# Patient Record
Sex: MALE | Race: WHITE | NOT HISPANIC OR LATINO | Employment: FULL TIME | URBAN - METROPOLITAN AREA
[De-identification: names, ages, dates, MRNs, and addresses within clinical notes are randomized per-mention and may not be internally consistent; named-entity substitution may affect disease eponyms.]

---
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
## 2018-01-13 VITALS
HEIGHT: 69 IN | BODY MASS INDEX: 20.88 KG/M2 | WEIGHT: 141 LBS | TEMPERATURE: 97.4 F | DIASTOLIC BLOOD PRESSURE: 64 MMHG | SYSTOLIC BLOOD PRESSURE: 108 MMHG

## 2018-01-13 NOTE — MISCELLANEOUS
To Whom It May Concern:    Milana Abernathy is a patient seen in our office  He has been previously diagnosed with autism and ADHD            Electronically signed by:Quincy Ignacio DO  Sep 26 2017  8:52PM EST

## 2018-01-14 VITALS
HEIGHT: 69 IN | DIASTOLIC BLOOD PRESSURE: 80 MMHG | WEIGHT: 146 LBS | SYSTOLIC BLOOD PRESSURE: 118 MMHG | BODY MASS INDEX: 21.62 KG/M2 | TEMPERATURE: 99.7 F

## 2018-01-14 VITALS
HEIGHT: 69 IN | BODY MASS INDEX: 21.18 KG/M2 | TEMPERATURE: 98.1 F | DIASTOLIC BLOOD PRESSURE: 84 MMHG | WEIGHT: 143 LBS | SYSTOLIC BLOOD PRESSURE: 138 MMHG

## 2018-01-23 VITALS
SYSTOLIC BLOOD PRESSURE: 122 MMHG | TEMPERATURE: 97.5 F | BODY MASS INDEX: 21.33 KG/M2 | WEIGHT: 144 LBS | HEIGHT: 69 IN | DIASTOLIC BLOOD PRESSURE: 80 MMHG

## 2018-01-23 NOTE — PROGRESS NOTES
Assessment    1  Encounter for preventive health examination (V70 0) (Z00 00)    Plan  Allergy to nuts    · EpiPen 2-Jayme 0 3 MG/0 3ML Injection Solution Auto-injector; Use as directed, prn  allergic reaction  Asthma    · Ventolin  (90 Base) MCG/ACT Inhalation Aerosol Solution; Inhale 2 puffs  every 6 hours as needed for cough  Health Maintenance    · Pantoprazole Sodium 20 MG Oral Tablet Delayed Release; Take 1 tablet daily    Discussion/Summary  Impression: health maintenance visit  Prostate cancer screening: the risks and benefits of prostate cancer screening were discussed  Testicular cancer screening: the risks and benefits of testicular cancer screening were discussed  Chief Complaint  physical      History of Present Illness  HM, Adult Male: The patient is being seen for a health maintenance evaluation  General Health: The patient's health since the last visit is described as good  He has regular dental visits  He denies vision problems  He denies hearing loss  Immunizations status: up to date  Lifestyle:  He consumes a diverse and healthy diet  He does not have any weight concerns  He exercises regularly  He does not use tobacco  He denies alcohol use  He denies drug use  Screening: cancer screening reviewed and current  metabolic screening reviewed and current  risk screening reviewed and current  Review of Systems    Constitutional: No fever or chills, feels well, no tiredness, no recent weight gain or weight loss  Eyes: No complaints of eye pain, no red eyes, no discharge from eyes, no itchy eyes  ENT: no complaints of earache, no hearing loss, no nosebleeds, no nasal discharge, no sore throat, no hoarseness  Cardiovascular: No complaints of slow heart rate, no fast heart rate, no chest pain, no palpitations, no leg claudication, no lower extremity  Respiratory: No complaints of shortness of breath, no wheezing, no cough, no SOB on exertion, no orthopnea or PND  Gastrointestinal: No complaints of abdominal pain, no constipation, no nausea or vomiting, no diarrhea or bloody stools  Genitourinary: No complaints of dysuria, no incontinence, no hesitancy, no nocturia, no genital lesion, no testicular pain  Musculoskeletal: No complaints of arthralgia, no myalgias, no joint swelling or stiffness, no limb pain or swelling  Integumentary: No complaints of skin rash or skin lesions, no itching, no skin wound, no dry skin  Neurological: No compliants of headache, no confusion, no convulsions, no numbness or tingling, no dizziness or fainting, no limb weakness, no difficulty walking  Psychiatric: Is not suicidal, no sleep disturbances, no anxiety or depression, no change in personality, no emotional problems  Endocrine: No complaints of proptosis, no hot flashes, no muscle weakness, no erectile dysfunction, no deepening of the voice, no feelings of weakness  Hematologic/Lymphatic: No complaints of swollen glands, no swollen glands in the neck, does not bleed easily, no easy bruising  Active Problems    1  Acute nonsuppurative otitis media, unspecified laterality (381 00) (H65 199)   2  Allergy to nuts (V15 05) (Z91 018)   3  Ankle Sprain (845 00)   4  Asthma (493 90) (J45 909)   5  Attention Deficit Disorder Without Hyperactivity (314 00)   6  Autism (299 00) (F84 0)   7  Cough (786 2) (R05)   8  's permit physical examination (V70 3) (Z02 4)   9  Fatigue (780 79) (R53 83)   10  Impacted cerumen of left ear (380 4) (H61 22)   11  Lipoma (214 9) (D17 9)   12  Lumbar strain (847 2) (S39 012A)   13  Myalgia And Myositis (729 1)   14  Physical exam for camp (V70 3) (Z02 89)   15  Right elbow pain (719 42) (M25 521)   16  School physical exam (V70 5) (Z02 0)   17  Thoracic back pain (724 1) (M54 6)    Family History  Mother    · No pertinent family history    Social History    · Never A Smoker    Current Meds   1   Benadryl Allergy 25 MG Oral Tablet; TAKE 1 TABLET EVERY 4 HOURS AS NEEDED   Recorded   2  EpiPen 2-Jayme 0 3 MG/0 3ML Injection Solution Auto-injector; Use as directed, prn allergic   reaction; Therapy: 42GYD8890 to (Last Rx:08Jun2017)  Requested for: 38QPH2380 Ordered   3  Montelukast Sodium 10 MG Oral Tablet; TAKE 1 TABLET DAILY; Therapy: 97YHV0503 to (96 179251)  Requested for: 14QKE1311; Last   Rx:08Jun2017 Ordered   4  Ventolin  (90 Base) MCG/ACT Inhalation Aerosol Solution; Inhale 2 puffs every 6   hours as needed for cough; Therapy: 44Oke7952 to (Last Rx:11Aug2016)  Requested for: 23Jnp8500 Ordered    Allergies    1  Peanut-derived    Vitals   Recorded: 48TBZ8265 09:33AM   Temperature 92 0 F   Systolic 946   Diastolic 80   Height 5 ft 9 in   Weight 144 lb    BMI Calculated 21 27   BSA Calculated 1 8   BMI Percentile 32 %   2-20 Stature Percentile 42 %   2-20 Weight Percentile 35 %     Physical Exam    Constitutional   General appearance: No acute distress, well appearing and well nourished  Eyes   Conjunctiva and lids: No swelling, erythema, or discharge  Pupils and irises: Equal, round and reactive to light  Ears, Nose, Mouth, and Throat   External inspection of ears and nose: Normal     Otoscopic examination: Tympanic membrance translucent with normal light reflex  Canals patent without erythema  Oropharynx: Normal with no erythema, edema, exudate or lesions  Pulmonary   Respiratory effort: No increased work of breathing or signs of respiratory distress  Auscultation of lungs: Clear to auscultation  Cardiovascular   Palpation of heart: Normal PMI, no thrills  Auscultation of heart: Normal rate and rhythm, normal S1 and S2, without murmurs  Examination of extremities for edema and/or varicosities: Normal     Abdomen   Abdomen: Non-tender, no masses  Liver and spleen: No hepatomegaly or splenomegaly  Lymphatic   Palpation of lymph nodes in neck: No lymphadenopathy      Musculoskeletal   Gait and station: Normal     Digits and nails: Normal without clubbing or cyanosis  Inspection/palpation of joints, bones, and muscles: Normal     Skin   Skin and subcutaneous tissue: Normal without rashes or lesions  Neurologic   Cranial nerves: Cranial nerves 2-12 intact  Reflexes: 2+ and symmetric  Sensation: No sensory loss      Psychiatric   Orientation to person, place and time: Normal     Mood and affect: Normal        Signatures   Electronically signed by : Leonor Colvin DO; Dec 29 2017  9:56AM EST                       (Author)

## 2018-02-28 ENCOUNTER — OFFICE VISIT (OUTPATIENT)
Dept: FAMILY MEDICINE CLINIC | Facility: CLINIC | Age: 18
End: 2018-02-28
Payer: COMMERCIAL

## 2018-02-28 VITALS — BODY MASS INDEX: 21.03 KG/M2 | TEMPERATURE: 99 F | WEIGHT: 142 LBS | HEIGHT: 69 IN

## 2018-02-28 DIAGNOSIS — Z00.00 WELL ADULT EXAM: Primary | ICD-10-CM

## 2018-02-28 DIAGNOSIS — J45.909 MILD ASTHMA WITHOUT COMPLICATION, UNSPECIFIED WHETHER PERSISTENT: ICD-10-CM

## 2018-02-28 DIAGNOSIS — F17.210 CIGARETTE NICOTINE DEPENDENCE WITHOUT COMPLICATION: ICD-10-CM

## 2018-02-28 PROBLEM — F84.0 AUTISM: Status: ACTIVE | Noted: 2017-06-08

## 2018-02-28 LAB
ALBUMIN SERPL BCP-MCNC: 4.2 G/DL (ref 3.5–5)
ALP SERPL-CCNC: 64 U/L (ref 46–484)
ALT SERPL W P-5'-P-CCNC: 18 U/L (ref 12–78)
ANION GAP SERPL CALCULATED.3IONS-SCNC: 6 MMOL/L (ref 4–13)
AST SERPL W P-5'-P-CCNC: 15 U/L (ref 5–45)
BASOPHILS # BLD AUTO: 0.04 THOUSANDS/ΜL (ref 0–0.1)
BASOPHILS NFR BLD AUTO: 1 % (ref 0–1)
BILIRUB SERPL-MCNC: 0.4 MG/DL (ref 0.2–1)
BUN SERPL-MCNC: 10 MG/DL (ref 5–25)
CALCIUM SERPL-MCNC: 9 MG/DL (ref 8.3–10.1)
CHLORIDE SERPL-SCNC: 106 MMOL/L (ref 100–108)
CHOLEST SERPL-MCNC: 159 MG/DL (ref 50–200)
CO2 SERPL-SCNC: 29 MMOL/L (ref 21–32)
CREAT SERPL-MCNC: 0.78 MG/DL (ref 0.6–1.3)
EOSINOPHIL # BLD AUTO: 0.3 THOUSAND/ΜL (ref 0–0.61)
EOSINOPHIL NFR BLD AUTO: 4 % (ref 0–6)
ERYTHROCYTE [DISTWIDTH] IN BLOOD BY AUTOMATED COUNT: 12.6 % (ref 11.6–15.1)
GFR SERPL CREATININE-BSD FRML MDRD: 131 ML/MIN/1.73SQ M
GLUCOSE P FAST SERPL-MCNC: 85 MG/DL (ref 65–99)
HCT VFR BLD AUTO: 42.5 % (ref 36.5–49.3)
HDLC SERPL-MCNC: 53 MG/DL (ref 40–60)
HGB BLD-MCNC: 14.3 G/DL (ref 12–17)
LDLC SERPL CALC-MCNC: 75 MG/DL (ref 0–100)
LYMPHOCYTES # BLD AUTO: 2.42 THOUSANDS/ΜL (ref 0.6–4.47)
LYMPHOCYTES NFR BLD AUTO: 33 % (ref 14–44)
MCH RBC QN AUTO: 29.9 PG (ref 26.8–34.3)
MCHC RBC AUTO-ENTMCNC: 33.6 G/DL (ref 31.4–37.4)
MCV RBC AUTO: 89 FL (ref 82–98)
MONOCYTES # BLD AUTO: 0.92 THOUSAND/ΜL (ref 0.17–1.22)
MONOCYTES NFR BLD AUTO: 12 % (ref 4–12)
NEUTROPHILS # BLD AUTO: 3.69 THOUSANDS/ΜL (ref 1.85–7.62)
NEUTS SEG NFR BLD AUTO: 50 % (ref 43–75)
NRBC BLD AUTO-RTO: 0 /100 WBCS
PLATELET # BLD AUTO: 383 THOUSANDS/UL (ref 149–390)
PMV BLD AUTO: 9.7 FL (ref 8.9–12.7)
POTASSIUM SERPL-SCNC: 4.4 MMOL/L (ref 3.5–5.3)
PROT SERPL-MCNC: 7.7 G/DL (ref 6.4–8.2)
RBC # BLD AUTO: 4.78 MILLION/UL (ref 3.88–5.62)
SODIUM SERPL-SCNC: 141 MMOL/L (ref 136–145)
TRIGL SERPL-MCNC: 155 MG/DL
WBC # BLD AUTO: 7.39 THOUSAND/UL (ref 4.31–10.16)

## 2018-02-28 PROCEDURE — 99395 PREV VISIT EST AGE 18-39: CPT | Performed by: FAMILY MEDICINE

## 2018-02-28 PROCEDURE — 36415 COLL VENOUS BLD VENIPUNCTURE: CPT | Performed by: FAMILY MEDICINE

## 2018-02-28 PROCEDURE — 99213 OFFICE O/P EST LOW 20 MIN: CPT | Performed by: FAMILY MEDICINE

## 2018-02-28 PROCEDURE — 3008F BODY MASS INDEX DOCD: CPT | Performed by: FAMILY MEDICINE

## 2018-02-28 PROCEDURE — 80053 COMPREHEN METABOLIC PANEL: CPT | Performed by: FAMILY MEDICINE

## 2018-02-28 PROCEDURE — 85025 COMPLETE CBC W/AUTO DIFF WBC: CPT | Performed by: FAMILY MEDICINE

## 2018-02-28 PROCEDURE — 80061 LIPID PANEL: CPT | Performed by: FAMILY MEDICINE

## 2018-02-28 RX ORDER — VARENICLINE TARTRATE 25 MG
KIT ORAL
Qty: 53 TABLET | Refills: 1 | Status: SHIPPED | OUTPATIENT
Start: 2018-02-28 | End: 2018-04-09 | Stop reason: SDUPTHER

## 2018-02-28 RX ORDER — ALBUTEROL SULFATE 90 UG/1
2 AEROSOL, METERED RESPIRATORY (INHALATION) EVERY 6 HOURS PRN
COMMUNITY
Start: 2016-08-11 | End: 2018-11-06 | Stop reason: SDUPTHER

## 2018-02-28 RX ORDER — DIPHENHYDRAMINE HCL 25 MG
1 CAPSULE ORAL EVERY 4 HOURS PRN
COMMUNITY
End: 2020-12-29

## 2018-04-09 DIAGNOSIS — F17.210 CIGARETTE NICOTINE DEPENDENCE WITHOUT COMPLICATION: ICD-10-CM

## 2018-04-10 RX ORDER — VARENICLINE TARTRATE 25 MG
KIT ORAL
Qty: 53 TABLET | Refills: 0 | Status: SHIPPED | OUTPATIENT
Start: 2018-04-10 | End: 2018-11-06

## 2018-10-01 NOTE — PROGRESS NOTES
Assessment/Plan:    Patient appears to be doing well  We discussed smoking cessation options  He would like to try Chantix  Side effect profile of medication reviewed  Prescription given to patient  If any side effects or difficulty with quitting recommend return to office for re-evaluation  Blood testing drawn today  Await results  He will call with any need for refills on asthma medication but asthma is stable at this time  No problem-specific Assessment & Plan notes found for this encounter  Diagnoses and all orders for this visit:    Well adult exam  -     CBC and differential  -     Comprehensive metabolic panel  -     Lipid Panel with Direct LDL reflex    Cigarette nicotine dependence without complication  -     varenicline (CHANTIX CBUA) 0 5 MG X 11 & 1 MG X 42 tablet; Take one 0 5mg tablet by mouth once daily for 3 days, then increase to one 0 5mg tablet twice daily for 3 days, then increase to one 1mg tablet twice daily   -     CBC and differential  -     Comprehensive metabolic panel  -     Lipid Panel with Direct LDL reflex    Other orders  -     diphenhydrAMINE (BENADRYL ALLERGY) 25 mg capsule; Take 1 tablet by mouth every 4 (four) hours as needed  -     albuterol (VENTOLIN HFA) 90 mcg/act inhaler; Inhale 2 puffs every 6 (six) hours as needed          Subjective:      Patient ID: Delia Menard is a 21 y o  male  Patient is here for well check  He states he is generally feeling well  Asthma is under good control  He is a smoker and would like to discuss smoking cessation options  He would like to try a pill to help him quit smoking  He smokes approximately 1/2 pack a day  Patient is also requesting blood testing to be done for insurance purposes  He has no specific requests for specific blood testing          The following portions of the patient's history were reviewed and updated as appropriate: allergies, current medications, past family history, past medical history, past social history, past surgical history and problem list     Review of Systems   Constitutional: Negative  HENT: Negative  Eyes: Negative  Respiratory: Negative  Cardiovascular: Negative  Gastrointestinal: Negative  Endocrine: Negative  Genitourinary: Negative  Musculoskeletal: Negative  Skin: Negative  Allergic/Immunologic: Negative  Neurological: Negative  Hematological: Negative  Psychiatric/Behavioral: Negative  Objective:      Temp 99 °F (37 2 °C)   Ht 5' 9" (1 753 m)   Wt 64 4 kg (142 lb)   BMI 20 97 kg/m²          Physical Exam   Constitutional: He is oriented to person, place, and time  He appears well-developed and well-nourished  HENT:   Head: Normocephalic and atraumatic  Right Ear: External ear normal  Tympanic membrane is not erythematous and not bulging  Left Ear: External ear normal  Tympanic membrane is not erythematous and not bulging  Nose: Nose normal    Mouth/Throat: Oropharynx is clear and moist and mucous membranes are normal  No oral lesions  No oropharyngeal exudate  Eyes: Conjunctivae and EOM are normal  Right eye exhibits no discharge  Left eye exhibits no discharge  No scleral icterus  Neck: Normal range of motion  Neck supple  No thyromegaly present  Cardiovascular: Normal rate, regular rhythm and normal heart sounds  Exam reveals no gallop and no friction rub  No murmur heard  Pulmonary/Chest: Effort normal  No respiratory distress  He has no wheezes  He has no rales  He exhibits no tenderness  Abdominal: Soft  Bowel sounds are normal  He exhibits no distension and no mass  There is no tenderness  There is no rebound and no guarding  Musculoskeletal: Normal range of motion  He exhibits no edema, tenderness or deformity  Lymphadenopathy:     He has no cervical adenopathy  Neurological: He is alert and oriented to person, place, and time  He has normal reflexes  No cranial nerve deficit   He exhibits normal muscle tone  Coordination normal    Skin: Skin is warm and dry  No rash noted  No erythema  No pallor  Psychiatric: He has a normal mood and affect  His behavior is normal    Vitals reviewed  What Is The Reason For Today's Visit?: Full Body Skin Examination What Is The Reason For Today's Visit? (Being Monitored For X): concerning skin lesions on an annual basis How Severe Are Your Spot(S)?: mild

## 2018-10-03 ENCOUNTER — TELEPHONE (OUTPATIENT)
Dept: FAMILY MEDICINE CLINIC | Facility: CLINIC | Age: 18
End: 2018-10-03

## 2018-10-03 NOTE — TELEPHONE ENCOUNTER
Patient explains that he is currently in [place], recently he had bubbles in around his mouth and wants to go for blood work, he's going to [facility] (fax # [phone])   Please advise his call back number is [phone]

## 2018-10-03 NOTE — TELEPHONE ENCOUNTER
It is difficult to know what to order without evaluating him  He may wish to consider being seen at an urgent care center in [facility]

## 2018-11-06 ENCOUNTER — OFFICE VISIT (OUTPATIENT)
Dept: FAMILY MEDICINE CLINIC | Facility: CLINIC | Age: 18
End: 2018-11-06
Payer: COMMERCIAL

## 2018-11-06 VITALS
HEIGHT: 69 IN | HEART RATE: 78 BPM | SYSTOLIC BLOOD PRESSURE: 136 MMHG | TEMPERATURE: 97.8 F | DIASTOLIC BLOOD PRESSURE: 82 MMHG | WEIGHT: 156 LBS | OXYGEN SATURATION: 98 % | BODY MASS INDEX: 23.11 KG/M2

## 2018-11-06 DIAGNOSIS — Z00.00 WELL ADULT EXAM: Primary | ICD-10-CM

## 2018-11-06 DIAGNOSIS — G43.009 MIGRAINE WITHOUT AURA AND WITHOUT STATUS MIGRAINOSUS, NOT INTRACTABLE: ICD-10-CM

## 2018-11-06 PROCEDURE — 3725F SCREEN DEPRESSION PERFORMED: CPT | Performed by: FAMILY MEDICINE

## 2018-11-06 PROCEDURE — 99395 PREV VISIT EST AGE 18-39: CPT | Performed by: FAMILY MEDICINE

## 2018-11-06 RX ORDER — RIZATRIPTAN BENZOATE 10 MG/1
10 TABLET, ORALLY DISINTEGRATING ORAL ONCE AS NEEDED
Qty: 27 TABLET | Refills: 3 | Status: SHIPPED | OUTPATIENT
Start: 2018-11-06 | End: 2018-11-06 | Stop reason: CLARIF

## 2018-11-06 RX ORDER — RIZATRIPTAN BENZOATE 10 MG/1
10 TABLET ORAL ONCE AS NEEDED
Qty: 27 TABLET | Refills: 1 | Status: SHIPPED | OUTPATIENT
Start: 2018-11-06 | End: 2020-12-29

## 2018-11-06 RX ORDER — ALBUTEROL SULFATE 1.25 MG/3ML
1 SOLUTION RESPIRATORY (INHALATION) EVERY 6 HOURS PRN
COMMUNITY

## 2018-11-06 NOTE — PROGRESS NOTES
Assessment/Plan:  Anticipatory guidance provided today  Continues to stay active and avoid smoking  Recommend consideration for starting medication to be taken as needed for migraines  Patient requested blood testing  Drawn today  Await results  Recommend return to office in 1 year for annual recheck  No problem-specific Assessment & Plan notes found for this encounter  Diagnoses and all orders for this visit:    Well adult exam  -     Cancel: Comprehensive metabolic panel  -     Cancel: CBC and differential  -     Cancel: TSH, 3rd generation with Free T4 reflex  -     Comprehensive metabolic panel  -     CBC and differential  -     TSH, 3rd generation with Free T4 reflex    Migraine without aura and without status migrainosus, not intractable  -     Discontinue: rizatriptan (MAXALT-MLT) 10 MG disintegrating tablet; Take 1 tablet (10 mg total) by mouth once as needed for migraine for up to 1 dose May repeat in 2 hours if needed  -     CBC and differential  -     TSH, 3rd generation with Free T4 reflex  -     rizatriptan (MAXALT) 10 MG tablet; Take 1 tablet (10 mg total) by mouth once as needed for migraine for up to 1 dose May repeat in 2 hours if needed    Other orders  -     albuterol (ACCUNEB) 1 25 MG/3ML nebulizer solution; Inhale 1 ampule every 6 (six) hours as needed          Subjective:      Patient ID: Faina Dennis is a 21 y o  male  The patient is here for well check  Patient Ng knowledge is that he was here in February for well check but would like another 1  He would also like to have blood testing drawn today  He is generally feeling well  Denies chest pain or shortness of breath  He remains active  He does note occasional history of migraines  He has a family history of migraines  Symptoms are intermittent and sometimes occur with changes in the weather or are food related          The following portions of the patient's history were reviewed and updated as appropriate: allergies, current medications, past family history, past medical history, past social history, past surgical history and problem list     Review of Systems   Constitutional: Negative  HENT: Negative  Eyes: Negative  Respiratory: Negative  Cardiovascular: Negative  Gastrointestinal: Negative  Endocrine: Negative  Genitourinary: Negative  Musculoskeletal: Negative  Skin: Negative  Allergic/Immunologic: Negative  Neurological: Negative  Hematological: Negative  Psychiatric/Behavioral: Negative  Objective:      /82 (BP Location: Left arm, Patient Position: Sitting, Cuff Size: Standard)   Pulse 78   Temp 97 8 °F (36 6 °C) (Tympanic)   Ht 5' 8 7" (1 745 m)   Wt 70 8 kg (156 lb)   SpO2 98%   BMI 23 24 kg/m²          Physical Exam   Constitutional: He is oriented to person, place, and time  He appears well-developed and well-nourished  HENT:   Head: Normocephalic and atraumatic  Right Ear: External ear normal  Tympanic membrane is not erythematous and not bulging  Left Ear: External ear normal  Tympanic membrane is not erythematous and not bulging  Nose: Nose normal    Mouth/Throat: Oropharynx is clear and moist and mucous membranes are normal  No oral lesions  No oropharyngeal exudate  Eyes: Conjunctivae and EOM are normal  Right eye exhibits no discharge  Left eye exhibits no discharge  No scleral icterus  Neck: Normal range of motion  Neck supple  No thyromegaly present  Cardiovascular: Normal rate, regular rhythm and normal heart sounds  Exam reveals no gallop and no friction rub  No murmur heard  Pulmonary/Chest: Effort normal  No respiratory distress  He has no wheezes  He has no rales  He exhibits no tenderness  Abdominal: Soft  Bowel sounds are normal  He exhibits no distension and no mass  There is no tenderness  There is no rebound and no guarding  Musculoskeletal: Normal range of motion  He exhibits no edema, tenderness or deformity  Lymphadenopathy:     He has no cervical adenopathy  Neurological: He is alert and oriented to person, place, and time  He has normal reflexes  No cranial nerve deficit  He exhibits normal muscle tone  Coordination normal    Skin: Skin is warm and dry  No rash noted  No erythema  No pallor  Psychiatric: He has a normal mood and affect  His behavior is normal    Vitals reviewed

## 2018-11-07 LAB
ALBUMIN SERPL-MCNC: 4.6 G/DL (ref 3.6–5.1)
ALBUMIN/GLOB SERPL: 1.6 (CALC) (ref 1–2.5)
ALP SERPL-CCNC: 52 U/L (ref 48–230)
ALT SERPL-CCNC: 22 U/L (ref 8–46)
AST SERPL-CCNC: 20 U/L (ref 12–32)
BASOPHILS # BLD AUTO: 59 CELLS/UL (ref 0–200)
BASOPHILS NFR BLD AUTO: 0.6 %
BILIRUB SERPL-MCNC: 0.9 MG/DL (ref 0.2–1.1)
BUN SERPL-MCNC: 7 MG/DL (ref 7–20)
BUN/CREAT SERPL: NORMAL (CALC) (ref 6–22)
CALCIUM SERPL-MCNC: 9.7 MG/DL (ref 8.9–10.4)
CHLORIDE SERPL-SCNC: 105 MMOL/L (ref 98–110)
CO2 SERPL-SCNC: 27 MMOL/L (ref 20–32)
CREAT SERPL-MCNC: 0.82 MG/DL (ref 0.6–1.26)
EOSINOPHIL # BLD AUTO: 216 CELLS/UL (ref 15–500)
EOSINOPHIL NFR BLD AUTO: 2.2 %
ERYTHROCYTE [DISTWIDTH] IN BLOOD BY AUTOMATED COUNT: 12.1 % (ref 11–15)
GLOBULIN SER CALC-MCNC: 2.9 G/DL (CALC) (ref 2.1–3.5)
GLUCOSE SERPL-MCNC: 90 MG/DL (ref 65–99)
HCT VFR BLD AUTO: 43.4 % (ref 38.5–50)
HGB BLD-MCNC: 14.9 G/DL (ref 13.2–17.1)
LYMPHOCYTES # BLD AUTO: 2509 CELLS/UL (ref 850–3900)
LYMPHOCYTES NFR BLD AUTO: 25.6 %
MCH RBC QN AUTO: 30.3 PG (ref 27–33)
MCHC RBC AUTO-ENTMCNC: 34.3 G/DL (ref 32–36)
MCV RBC AUTO: 88.2 FL (ref 80–100)
MONOCYTES # BLD AUTO: 745 CELLS/UL (ref 200–950)
MONOCYTES NFR BLD AUTO: 7.6 %
NEUTROPHILS # BLD AUTO: 6272 CELLS/UL (ref 1500–7800)
NEUTROPHILS NFR BLD AUTO: 64 %
PLATELET # BLD AUTO: 333 THOUSAND/UL (ref 140–400)
PMV BLD REES-ECKER: 10.1 FL (ref 7.5–12.5)
POTASSIUM SERPL-SCNC: 4.2 MMOL/L (ref 3.8–5.1)
PROT SERPL-MCNC: 7.5 G/DL (ref 6.3–8.2)
RBC # BLD AUTO: 4.92 MILLION/UL (ref 4.2–5.8)
SL AMB EGFR AFRICAN AMERICAN: 149 ML/MIN/1.73M2
SL AMB EGFR NON AFRICAN AMERICAN: 128 ML/MIN/1.73M2
SODIUM SERPL-SCNC: 138 MMOL/L (ref 135–146)
TSH SERPL-ACNC: 2.19 MIU/L (ref 0.5–4.3)
WBC # BLD AUTO: 9.8 THOUSAND/UL (ref 3.8–10.8)

## 2019-07-05 ENCOUNTER — OFFICE VISIT (OUTPATIENT)
Dept: FAMILY MEDICINE CLINIC | Facility: CLINIC | Age: 19
End: 2019-07-05
Payer: COMMERCIAL

## 2019-07-05 VITALS
DIASTOLIC BLOOD PRESSURE: 86 MMHG | TEMPERATURE: 98.4 F | SYSTOLIC BLOOD PRESSURE: 132 MMHG | WEIGHT: 157 LBS | BODY MASS INDEX: 23.79 KG/M2 | HEIGHT: 68 IN

## 2019-07-05 DIAGNOSIS — M79.672 PAIN IN BOTH FEET: Primary | ICD-10-CM

## 2019-07-05 DIAGNOSIS — L30.9 ECZEMA, UNSPECIFIED TYPE: ICD-10-CM

## 2019-07-05 DIAGNOSIS — J45.909 MILD ASTHMA WITHOUT COMPLICATION, UNSPECIFIED WHETHER PERSISTENT: ICD-10-CM

## 2019-07-05 DIAGNOSIS — M79.671 PAIN IN BOTH FEET: Primary | ICD-10-CM

## 2019-07-05 PROCEDURE — 36415 COLL VENOUS BLD VENIPUNCTURE: CPT | Performed by: FAMILY MEDICINE

## 2019-07-05 PROCEDURE — 99214 OFFICE O/P EST MOD 30 MIN: CPT | Performed by: FAMILY MEDICINE

## 2019-07-05 RX ORDER — MOMETASONE FUROATE 1 MG/G
CREAM TOPICAL DAILY
Qty: 45 G | Refills: 0 | Status: SHIPPED | OUTPATIENT
Start: 2019-07-05 | End: 2020-12-29

## 2019-07-05 RX ORDER — MELOXICAM 15 MG/1
15 TABLET ORAL DAILY
Qty: 14 TABLET | Refills: 0 | Status: SHIPPED | OUTPATIENT
Start: 2019-07-05 | End: 2020-12-29

## 2019-07-05 RX ORDER — IBUPROFEN 600 MG/1
TABLET ORAL
Refills: 0 | COMMUNITY
Start: 2019-05-07 | End: 2019-07-05 | Stop reason: ALTCHOICE

## 2019-07-05 NOTE — PROGRESS NOTES
Assessment/Plan:  No significant findings on physical exam   Recommend follow-up with podiatrist   Await blood test results requested by patient  Asthma is currently stable  No problem-specific Assessment & Plan notes found for this encounter  Diagnoses and all orders for this visit:    Pain in both feet  -     meloxicam (MOBIC) 15 mg tablet; Take 1 tablet (15 mg total) by mouth daily  -     Cancel: CBC and differential  -     Cancel: Comprehensive metabolic panel  -     CBC and differential  -     Comprehensive metabolic panel    Eczema, unspecified type  -     mometasone (ELOCON) 0 1 % cream; Apply topically daily    Mild asthma without complication, unspecified whether persistent    Other orders  -     Discontinue: ibuprofen (MOTRIN) 600 mg tablet; TAKE 1 TABLET BY MOUTH EVERY 6 HOURS FOR FIRST 48 HOURS, THEN AS NEEDED FOR PAIN          Subjective:      Patient ID: Maycol Parra is a 19 y o  male  Patient is here for with bilateral foot pain  He states onset of symptoms was 1-2 months ago  Pain is to the plantar aspects of both feet  Denies proximal leg pain  No back pain  Patient also notes occasional fatigue and would like blood testing done  No fatigue shortness of breath or chest pain in the last 1 week  Patient also complains of dry skin to the bilateral arms intermittently over the last several months  The following portions of the patient's history were reviewed and updated as appropriate: allergies, current medications, past family history, past medical history, past social history, past surgical history and problem list     Review of Systems   Constitutional: Negative  HENT: Negative  Eyes: Negative  Respiratory: Negative  Cardiovascular: Negative  Gastrointestinal: Negative  Endocrine: Negative  Genitourinary: Negative  Musculoskeletal: Positive for arthralgias  Skin: Positive for rash  Allergic/Immunologic: Negative  Neurological: Negative  Hematological: Negative  Psychiatric/Behavioral: Negative  Objective:      /86 (BP Location: Left arm, Patient Position: Sitting, Cuff Size: Adult)   Temp 98 4 °F (36 9 °C)   Ht 5' 8" (1 727 m)   Wt 71 2 kg (157 lb)   BMI 23 87 kg/m²          Physical Exam   Constitutional: He is oriented to person, place, and time  He appears well-developed and well-nourished  HENT:   Head: Normocephalic and atraumatic  Right Ear: External ear normal  Tympanic membrane is not erythematous and not bulging  Left Ear: External ear normal  Tympanic membrane is not erythematous and not bulging  Nose: Nose normal    Mouth/Throat: Oropharynx is clear and moist and mucous membranes are normal  No oral lesions  No oropharyngeal exudate  Eyes: Conjunctivae and EOM are normal  Right eye exhibits no discharge  Left eye exhibits no discharge  No scleral icterus  Neck: Normal range of motion  Neck supple  No thyromegaly present  Cardiovascular: Normal rate, regular rhythm and normal heart sounds  Exam reveals no gallop and no friction rub  No murmur heard  Pulmonary/Chest: Effort normal  No respiratory distress  He has no wheezes  He has no rales  He exhibits no tenderness  Abdominal: Soft  Bowel sounds are normal  He exhibits no distension and no mass  There is no tenderness  There is no rebound and no guarding  Musculoskeletal: Normal range of motion  He exhibits no edema, tenderness or deformity  Lymphadenopathy:     He has no cervical adenopathy  Neurological: He is alert and oriented to person, place, and time  He has normal reflexes  No cranial nerve deficit  He exhibits normal muscle tone  Coordination normal    Skin: Skin is warm and dry  Rash (Mild eczema to the bilateral antecubital areas of the arms and forearms ) noted  No erythema  No pallor  Psychiatric: He has a normal mood and affect  His behavior is normal    Vitals reviewed

## 2019-07-06 LAB
ALBUMIN SERPL-MCNC: 4.6 G/DL (ref 3.6–5.1)
ALBUMIN/GLOB SERPL: 1.6 (CALC) (ref 1–2.5)
ALP SERPL-CCNC: 63 U/L (ref 40–115)
ALT SERPL-CCNC: 19 U/L (ref 9–46)
AST SERPL-CCNC: 18 U/L (ref 10–40)
BASOPHILS # BLD AUTO: 83 CELLS/UL (ref 0–200)
BASOPHILS NFR BLD AUTO: 1.1 %
BILIRUB SERPL-MCNC: 0.5 MG/DL (ref 0.2–1.2)
BUN SERPL-MCNC: 8 MG/DL (ref 7–25)
BUN/CREAT SERPL: NORMAL (CALC) (ref 6–22)
CALCIUM SERPL-MCNC: 9.8 MG/DL (ref 8.6–10.3)
CHLORIDE SERPL-SCNC: 105 MMOL/L (ref 98–110)
CO2 SERPL-SCNC: 24 MMOL/L (ref 20–32)
CREAT SERPL-MCNC: 0.86 MG/DL (ref 0.6–1.35)
EOSINOPHIL # BLD AUTO: 330 CELLS/UL (ref 15–500)
EOSINOPHIL NFR BLD AUTO: 4.4 %
ERYTHROCYTE [DISTWIDTH] IN BLOOD BY AUTOMATED COUNT: 12.2 % (ref 11–15)
GLOBULIN SER CALC-MCNC: 2.8 G/DL (CALC) (ref 1.9–3.7)
GLUCOSE SERPL-MCNC: 89 MG/DL (ref 65–99)
HCT VFR BLD AUTO: 47.6 % (ref 38.5–50)
HGB BLD-MCNC: 15.8 G/DL (ref 13.2–17.1)
LYMPHOCYTES # BLD AUTO: 2685 CELLS/UL (ref 850–3900)
LYMPHOCYTES NFR BLD AUTO: 35.8 %
MCH RBC QN AUTO: 29.6 PG (ref 27–33)
MCHC RBC AUTO-ENTMCNC: 33.2 G/DL (ref 32–36)
MCV RBC AUTO: 89.3 FL (ref 80–100)
MONOCYTES # BLD AUTO: 720 CELLS/UL (ref 200–950)
MONOCYTES NFR BLD AUTO: 9.6 %
NEUTROPHILS # BLD AUTO: 3683 CELLS/UL (ref 1500–7800)
NEUTROPHILS NFR BLD AUTO: 49.1 %
PLATELET # BLD AUTO: 331 THOUSAND/UL (ref 140–400)
PMV BLD REES-ECKER: 10 FL (ref 7.5–12.5)
POTASSIUM SERPL-SCNC: 4.2 MMOL/L (ref 3.5–5.3)
PROT SERPL-MCNC: 7.4 G/DL (ref 6.1–8.1)
RBC # BLD AUTO: 5.33 MILLION/UL (ref 4.2–5.8)
SL AMB EGFR AFRICAN AMERICAN: 145 ML/MIN/1.73M2
SL AMB EGFR NON AFRICAN AMERICAN: 125 ML/MIN/1.73M2
SODIUM SERPL-SCNC: 137 MMOL/L (ref 135–146)
WBC # BLD AUTO: 7.5 THOUSAND/UL (ref 3.8–10.8)

## 2019-10-30 ENCOUNTER — OCCMED (OUTPATIENT)
Dept: URGENT CARE | Facility: CLINIC | Age: 19
End: 2019-10-30
Payer: OTHER MISCELLANEOUS

## 2019-10-30 DIAGNOSIS — L08.9 INFECTED WOUND: Primary | ICD-10-CM

## 2019-10-30 DIAGNOSIS — T14.8XXA INFECTED WOUND: Primary | ICD-10-CM

## 2019-10-30 PROCEDURE — 99203 OFFICE O/P NEW LOW 30 MIN: CPT | Performed by: PHYSICIAN ASSISTANT

## 2019-10-30 PROCEDURE — 99283 EMERGENCY DEPT VISIT LOW MDM: CPT | Performed by: PHYSICIAN ASSISTANT

## 2019-10-31 ENCOUNTER — APPOINTMENT (OUTPATIENT)
Dept: URGENT CARE | Facility: CLINIC | Age: 19
End: 2019-10-31
Payer: OTHER MISCELLANEOUS

## 2019-10-31 PROCEDURE — 99213 OFFICE O/P EST LOW 20 MIN: CPT | Performed by: FAMILY MEDICINE

## 2019-11-05 ENCOUNTER — APPOINTMENT (OUTPATIENT)
Dept: URGENT CARE | Facility: CLINIC | Age: 19
End: 2019-11-05
Payer: OTHER MISCELLANEOUS

## 2019-11-05 PROCEDURE — 99213 OFFICE O/P EST LOW 20 MIN: CPT | Performed by: FAMILY MEDICINE

## 2019-11-12 ENCOUNTER — APPOINTMENT (OUTPATIENT)
Dept: URGENT CARE | Facility: CLINIC | Age: 19
End: 2019-11-12
Payer: OTHER MISCELLANEOUS

## 2019-11-12 PROCEDURE — 99213 OFFICE O/P EST LOW 20 MIN: CPT | Performed by: FAMILY MEDICINE

## 2019-12-04 ENCOUNTER — OFFICE VISIT (OUTPATIENT)
Dept: FAMILY MEDICINE CLINIC | Facility: CLINIC | Age: 19
End: 2019-12-04
Payer: COMMERCIAL

## 2019-12-04 VITALS
HEART RATE: 92 BPM | WEIGHT: 164 LBS | HEIGHT: 70 IN | TEMPERATURE: 98.2 F | SYSTOLIC BLOOD PRESSURE: 130 MMHG | OXYGEN SATURATION: 98 % | DIASTOLIC BLOOD PRESSURE: 82 MMHG | BODY MASS INDEX: 23.48 KG/M2

## 2019-12-04 DIAGNOSIS — J18.9 PNEUMONIA OF LEFT LOWER LOBE DUE TO INFECTIOUS ORGANISM: Primary | ICD-10-CM

## 2019-12-04 DIAGNOSIS — R93.89 ABNORMAL CHEST X-RAY: ICD-10-CM

## 2019-12-04 PROCEDURE — 1036F TOBACCO NON-USER: CPT | Performed by: FAMILY MEDICINE

## 2019-12-04 PROCEDURE — 3008F BODY MASS INDEX DOCD: CPT | Performed by: FAMILY MEDICINE

## 2019-12-04 PROCEDURE — 99213 OFFICE O/P EST LOW 20 MIN: CPT | Performed by: FAMILY MEDICINE

## 2019-12-04 PROCEDURE — 3725F SCREEN DEPRESSION PERFORMED: CPT | Performed by: FAMILY MEDICINE

## 2019-12-04 RX ORDER — DOXYCYCLINE HYCLATE 100 MG/1
CAPSULE ORAL
COMMUNITY
Start: 2019-12-04 | End: 2020-03-25 | Stop reason: SDUPTHER

## 2019-12-04 NOTE — PROGRESS NOTES
Assessment/Plan:  [facility]   I spoke with the physician that saw patient earlier today and x-ray has not been read by radiologist yet but urgent care physician, Dr Trina Pan, is concerned that there may be some findings more significant than pneumonia  She stated that chest x-ray will be read by radiologist in the hope to have that report tomorrow  I relayed this information to the patient today  I do recommend follow-up CT scan if radiologist findings are consistent with urgent care physician's findings  Script given for CT scan to be done if needed  I asked patient to give us a call if he hears back from the urgent care center tomorrow and I gave him our fax number to have results faxed from [facility] with his permission  He is on doxycycline twice daily and will continue on this medication  He will call with any worsening symptoms or any fevers in the interim  No problem-specific Assessment & Plan notes found for this encounter  Diagnoses and all orders for this visit:    Pneumonia of left lower lobe due to infectious organism [facility])  -     CT chest wo contrast; Future    Abnormal chest x-ray  -     CT chest wo contrast; Future    Other orders  -     doxycycline hyclate (VIBRAMYCIN) 100 mg capsule          Subjective:      Patient ID: Alexi Allen is a 22 y o  male  Patient is here today for follow-up after [facility] earlier in the day  He had a chest x-ray done and was told that he likely has pneumonia but he had some hilar adenopathy on the chest x-ray according to the physician, Dr Sugey Diamond  Patient states he was told to get a follow-up CT scan for better look at this  I do not have a copy of the x-ray report  Patient states he has had a cough intermittently for the last 1 month  He is a smoker  No chronic fevers        The following portions of the patient's history were reviewed and updated as appropriate: allergies, current medications, past family history, past medical history, past social history, past surgical history and problem list     Review of Systems   Constitutional: Negative  Negative for fever  HENT: Negative  Eyes: Negative  Respiratory: Positive for cough  Negative for shortness of breath  Cardiovascular: Negative  Gastrointestinal: Negative  Endocrine: Negative  Genitourinary: Negative  Musculoskeletal: Negative  Skin: Negative  Allergic/Immunologic: Negative  Neurological: Negative  Hematological: Negative  Psychiatric/Behavioral: Negative  Objective:      /82 (BP Location: Left arm, Patient Position: Sitting, Cuff Size: Standard)   Pulse 92   Temp 98 2 °F (36 8 °C) (Tympanic)   Ht 5' 9 69" (1 77 m)   Wt 74 4 kg (164 lb)   SpO2 98%   BMI 23 74 kg/m²          Physical Exam   Constitutional: He is oriented to person, place, and time  He appears well-developed and well-nourished  HENT:   Head: Normocephalic and atraumatic  Right Ear: External ear normal  Tympanic membrane is not erythematous and not bulging  Left Ear: External ear normal  Tympanic membrane is not erythematous and not bulging  Nose: Nose normal    Mouth/Throat: Oropharynx is clear and moist and mucous membranes are normal  No oral lesions  No oropharyngeal exudate  Eyes: Conjunctivae and EOM are normal  Right eye exhibits no discharge  Left eye exhibits no discharge  No scleral icterus  Neck: Normal range of motion  Neck supple  No thyromegaly present  Cardiovascular: Normal rate, regular rhythm and normal heart sounds  Exam reveals no gallop and no friction rub  No murmur heard  Pulmonary/Chest: Effort normal  No respiratory distress  He has no wheezes  He has no rales  He exhibits no tenderness  Abdominal: Soft  Bowel sounds are normal  He exhibits no distension and no mass  There is no tenderness  There is no rebound and no guarding     Musculoskeletal: Normal range of motion  He exhibits no edema, tenderness or deformity  Lymphadenopathy:     He has no cervical adenopathy  Neurological: He is alert and oriented to person, place, and time  He has normal reflexes  No cranial nerve deficit  He exhibits normal muscle tone  Coordination normal    Skin: Skin is warm and dry  No rash noted  No erythema  No pallor  Psychiatric: He has a normal mood and affect  His behavior is normal    Vitals reviewed

## 2019-12-11 ENCOUNTER — HOSPITAL ENCOUNTER (OUTPATIENT)
Dept: CT IMAGING | Facility: HOSPITAL | Age: 19
Discharge: HOME | End: 2019-12-11
Payer: COMMERCIAL

## 2019-12-11 DIAGNOSIS — R93.89 ABNORMAL CHEST X-RAY: ICD-10-CM

## 2019-12-11 DIAGNOSIS — J18.9 PNEUMONIA OF LEFT LOWER LOBE DUE TO INFECTIOUS ORGANISM: ICD-10-CM

## 2019-12-11 PROCEDURE — 71250 CT THORAX DX C-: CPT

## 2019-12-13 ENCOUNTER — TELEPHONE (OUTPATIENT)
Dept: FAMILY MEDICINE CLINIC | Facility: CLINIC | Age: 19
End: 2019-12-13

## 2019-12-17 ENCOUNTER — TELEPHONE (OUTPATIENT)
Dept: FAMILY MEDICINE CLINIC | Facility: CLINIC | Age: 19
End: 2019-12-17

## 2019-12-17 NOTE — TELEPHONE ENCOUNTER
Recommend office re-evaluation if he is concerned that he is still sick  Recommend use of Delsym or Robitussin as needed in the meantime

## 2019-12-17 NOTE — TELEPHONE ENCOUNTER
Darnell called and LMOM as he would like a cough medication sent to his pharmacy for his pneumonia as OTC medications are not helping, His pharmacy is [facility]     Please advise  Thank you

## 2019-12-19 ENCOUNTER — TELEPHONE (OUTPATIENT)
Dept: FAMILY MEDICINE CLINIC | Facility: CLINIC | Age: 19
End: 2019-12-19

## 2020-03-25 ENCOUNTER — AMB VIDEO VISIT (OUTPATIENT)
Dept: URGENT CARE | Facility: CLINIC | Age: 20
End: 2020-03-25

## 2020-03-25 ENCOUNTER — TELEMEDICINE (OUTPATIENT)
Dept: FAMILY MEDICINE CLINIC | Facility: CLINIC | Age: 20
End: 2020-03-25
Payer: COMMERCIAL

## 2020-03-25 ENCOUNTER — TELEPHONE (OUTPATIENT)
Dept: OTHER | Facility: OTHER | Age: 20
End: 2020-03-25

## 2020-03-25 DIAGNOSIS — R06.02 SHORTNESS OF BREATH: ICD-10-CM

## 2020-03-25 DIAGNOSIS — J45.909 MILD ASTHMA WITHOUT COMPLICATION, UNSPECIFIED WHETHER PERSISTENT: ICD-10-CM

## 2020-03-25 DIAGNOSIS — R53.83 FATIGUE, UNSPECIFIED TYPE: Primary | ICD-10-CM

## 2020-03-25 DIAGNOSIS — R42 LIGHTHEADEDNESS: ICD-10-CM

## 2020-03-25 DIAGNOSIS — B34.9 VIRAL SYNDROME: Primary | ICD-10-CM

## 2020-03-25 DIAGNOSIS — J01.00 ACUTE NON-RECURRENT MAXILLARY SINUSITIS: ICD-10-CM

## 2020-03-25 PROCEDURE — 99213 OFFICE O/P EST LOW 20 MIN: CPT | Performed by: FAMILY MEDICINE

## 2020-03-25 RX ORDER — DOXYCYCLINE HYCLATE 100 MG/1
100 CAPSULE ORAL EVERY 12 HOURS SCHEDULED
Qty: 14 CAPSULE | Refills: 0 | Status: SHIPPED | OUTPATIENT
Start: 2020-03-25 | End: 2020-04-01

## 2020-03-25 RX ORDER — MONTELUKAST SODIUM 10 MG/1
10 TABLET ORAL
Qty: 30 TABLET | Refills: 1 | Status: SHIPPED | OUTPATIENT
Start: 2020-03-25 | End: 2020-12-29

## 2020-03-25 NOTE — TELEPHONE ENCOUNTER
PT calling asking to please e-mail him his letter stating the quantity of days that he is suppose to be absent from work  PT'S  e-mail is: [email or web address]  If you have any questions please call and follow up with PT   Thanks

## 2020-03-25 NOTE — CARE ANYWHERE EVISITS
Visit Summary for Cathy Adrian - Gender: Male - Date of Birth: [birth year]  Date: [id number] - Duration: 8 minutes  Patient: Cathy Adrian  Provider: Malaika Owen PA-C    Patient Contact Information  Address  [address]; [address] [facility]      Visit Topics  Dizzy  Weak stomach, joint pain, chest pain [Added By: Self - 2020-03-25]    Sick Slip  Reason [ILLNESS]  Remarks [Patient was seen on 3/25/2020  Please excuse him from work on 3/25/2020 and 3/26/2020 ]  Triage Questions   Have you had any international travel in the last 14 days? Answer []  Have you had any exposure to a known or expected Covid-19 patient in the last 14 days? Answer []  Do you have any immune system compromise or chronic lung disease? Answer   []  Do you have any vulnerable family members in the home (infant, pregnant, cancer, elderly)? Answer []     Conversation Transcripts  [0A][0A] [Notification] You are connected with Malaika Owen PA-C, Urgent Care Specialist [0A][Notification] Cathy Adrian is located in [place]  [0A][Notification] Cathy Adrian has shared health history  Ilan Cao  [0A]    Diagnosis  Dizziness and giddiness  Shortness of breath  Other viral infections of unspecified site    Procedures  Value: [id number] Code: CPT-4 UNLISTED E&M SERVICE    Medications Prescribed    No prescriptions ordered    Electronically signed by: Firman Gowers, Alanna(NPI [id number])

## 2020-03-25 NOTE — PROGRESS NOTES
Video Visit - Jan Chung 22 y o  male MRN: [id number]    REQUIRED DOCUMENTATION:         1  This service was provided via [facility]  2  Provider located at [address] [place] [address]   [address] [facility] [address] [phone]   3  [facility] provider: Britany Carrero PA-C   4  Identify all parties in room with patient during [facility] visit:  Yes  5  After connecting through televideo, patient was identified by name and date of birth  Patient was then informed that this was a Telemedicine visit and that the exam was being conducted confidentially over secure lines  My office door was closed  No one else was in the room  Patient acknowledged consent and understanding of privacy and security of the Telemedicine visit  I informed the patient that I have reviewed their record in Epic and presented the opportunity for them to ask any questions regarding the visit today  The patient agreed to participate  Patient admits to worsening 2 week history of lightheadedness, nausea, joint pain, chest pain, HA, fatigue, vomiting (resolved), 1 episode of diarrhea per day, and SOB with exertion  Denies melena, hematochezia, cough, fever  Patient was diagnosed with pneumonia in late November with resolution of cough  States he has been drinking 9-10 16oz bottles of water per day  He has been seen in a patient [facility] a few times over the past few weeks and given unkn antibiotics  Physical Exam  Diagnoses and all orders for this visit:    Viral syndrome    Lightheadedness    Shortness of breath      There are no Patient Instructions on file for this visit  Recommended patient go to ED for further evaluation  Additionally gave patient findadoctor  [facility]  org website to find a PCP

## 2020-03-25 NOTE — PROGRESS NOTES
Virtual Regular Visit   Assessment:  Patient with history of asthma has mild intermittent shortness of breath or cough episodes  He denies any fevers  He currently is no longer taking Singulair  He does note occasional fatigue but no fevers or night sweats or throughout his  No upper respiratory symptoms otherwise  No GI complaints  Recommendation was to add Singulair  Ordered blood testing as well to be done if no improvement in symptoms  Recommend he call the office if fatigue worsens or any fevers develop or new symptoms persist   Await lab test results  Consider Covid testing if fevers develop her symptoms worsen  Problem List Items Addressed This Visit        Respiratory    Asthma    Relevant Medications    montelukast (SINGULAIR) 10 mg tablet      Other Visit Diagnoses     Fatigue, unspecified type    -  Primary    Relevant Orders    CBC and differential    Comprehensive metabolic panel    TSH, 3rd generation with Free T4 reflex    Acute non-recurrent maxillary sinusitis        Relevant Medications    doxycycline hyclate (VIBRAMYCIN) 100 mg capsule               Reason for visit is fatigue and occasional coughing over the last 1 month  No fevers  Encounter provider Denver Hogue DO    Provider located at [address] [place] [address]      Recent Visits  No visits were found meeting these conditions  Showing recent visits within past 7 days and meeting all other requirements     Future Appointments  No visits were found meeting these conditions  Showing future appointments within next 150 days and meeting all other requirements        After connecting through [employer], the patient was identified by name and date of birth  Catia Peñaloza was informed that this is a telemedicine visit and that the visit is being conducted through [employer] which may not be secure and therefore, might not be HIPAA-compliant    My office door was closed  No one else was in the room  He acknowledged consent and understanding of privacy and security of the video platform  The patient has agreed to participate and understands they can discontinue the visit at any time  Will Billy is a 22 y o  male with cough and occasional fatigue over the last 1 month  He denies any chest pain or shortness of breath         Past Medical History:   Diagnosis Date    Asthma        Past Surgical History:   Procedure Laterality Date    NO PAST SURGERIES         Current Outpatient Medications   Medication Sig Dispense Refill    albuterol (ACCUNEB) 1 25 MG/3ML nebulizer solution Inhale 1 ampule every 6 (six) hours as needed      albuterol (VENTOLIN HFA) 90 mcg/act inhaler Inhale      diphenhydrAMINE (BENADRYL ALLERGY) 25 mg capsule Take 1 tablet by mouth every 4 (four) hours as needed      doxycycline hyclate (VIBRAMYCIN) 100 mg capsule Take 1 capsule (100 mg total) by mouth every 12 (twelve) hours for 7 days 14 capsule 0    EPINEPHrine (EPIPEN 2-CUBA) 0 3 mg/0 3 mL SOAJ EpiPen 2-[place] 0 3 MG/0 3ML Injection Solution Auto-injector  Use as directed, prn allergic reaction   Quantity: 1;  Refills: 1      Quincy Ignacio DO ;  Start 31-July-2014  Active  2 Solution Auto-injector Pen      meloxicam (MOBIC) 15 mg tablet Take 1 tablet (15 mg total) by mouth daily (Patient not taking: Reported on 12/4/2019) 14 tablet 0    mometasone (ELOCON) 0 1 % cream Apply topically daily (Patient not taking: Reported on 12/4/2019) 45 g 0    montelukast (SINGULAIR) 10 mg tablet Take 1 tablet (10 mg total) by mouth daily at bedtime 30 tablet 1    rizatriptan (MAXALT) 10 MG tablet Take 1 tablet (10 mg total) by mouth once as needed for migraine for up to 1 dose May repeat in 2 hours if needed (Patient not taking: Reported on 12/4/2019) 27 tablet 1     No current facility-administered medications for this visit           Allergies   Allergen Reactions    Nuts Anaphylaxis    Shellfish-Derived Products Anaphylaxis    Other      Tree nuts  Yogurt  Carrots  Lymes    Peanut Oil        Review of Systems   Constitutional: Positive for fatigue (Mild intermittent)  Negative for fever  HENT: Negative  Negative for congestion  Eyes: Negative  Respiratory: Positive for cough ( very infrequent)  Negative for shortness of breath, wheezing and stridor  Cardiovascular: Negative  Gastrointestinal: Negative  Endocrine: Negative  Genitourinary: Negative  Musculoskeletal: Negative  Skin: Negative  Allergic/Immunologic: Negative  Neurological: Negative  Hematological: Negative  Psychiatric/Behavioral: Negative  Physical Exam   Constitutional: He is oriented to person, place, and time  He appears well-developed and well-nourished  No distress  Neurological: He is alert and oriented to person, place, and time  Psychiatric: He has a normal mood and affect  His behavior is normal  Judgment and thought content normal         I spent 20 minutes with the patient during this visit

## 2020-03-26 NOTE — TELEPHONE ENCOUNTER
Please let patient know that note is in the chart  He can print that out via my chart or someone can fax or scan at to him by email     Thanks

## 2020-03-26 NOTE — TELEPHONE ENCOUNTER
Pt states that he is having trouble with [employer]  Advised pt to call the # for tech support for [employer]  He states he will do that

## 2020-03-26 NOTE — TELEPHONE ENCOUNTER
I do not see a letter already from his vist yesterday  Can you please clarify as to when patient should remain out of work?

## 2020-03-30 ENCOUNTER — TELEMEDICINE (OUTPATIENT)
Dept: FAMILY MEDICINE CLINIC | Facility: CLINIC | Age: 20
End: 2020-03-30
Payer: COMMERCIAL

## 2020-03-30 DIAGNOSIS — R05.9 COUGH: Primary | ICD-10-CM

## 2020-03-30 DIAGNOSIS — R50.9 FEVER, UNSPECIFIED FEVER CAUSE: ICD-10-CM

## 2020-03-30 PROCEDURE — 99213 OFFICE O/P EST LOW 20 MIN: CPT | Performed by: FAMILY MEDICINE

## 2020-03-30 NOTE — LETTER
March 30, 2020     Patient: Pete Rodo   YOB: 2000   Date of Visit: 3/30/2020       To Whom it May Concern:    Pia Den is under my professional care  He was seen in my office on 3/30/2020  He may return to work on 4/6/2020  If you have any questions or concerns, please don't hesitate to call           Sincerely,          Demetra Shore DO        CC: No Recipients

## 2020-03-30 NOTE — PROGRESS NOTES
Virtual Regular Visit    Problem List Items Addressed This Visit     None               Reason for visit is fever and cough  Encounter provider Jian Allisno DO    Provider located at [address] [facility] [phone]      Recent Visits  Date Type Provider Dept   03/25/20 Lauren Bright 88, DO [facility]   Showing recent visits within past 7 days and meeting all other requirements     Future Appointments  No visits were found meeting these conditions  Showing future appointments within next 150 days and meeting all other requirements        The patient was identified by name and date of birth  Marifer Cosme was informed that this is a telemedicine visit and that the visit is being conducted through [employer] and patient was informed that this is not a secure, HIPAA-complaint platform  he agrees to proceed     My office door was closed  No one else was in the room  He acknowledged consent and understanding of privacy and security of the video platform  The patient has agreed to participate and understands they can discontinue the visit at any time  Patient is aware this is a billable service  Latrice Olivas is a 22 y o  male who states he has been feeling ill on and off for the past month  He was diagnosed with pneumonia in December of 2019  Patient was seen at [address] [facility] on a video visit on 03/25/2020 and diagnosed with viral infection  He then had a tele video visit with Dr Jeffy Navarro on 03/25/2020 complaining of cough and fatigue for 1 month  He has a history of asthma and Singulair was added and labs are ordered although patient never completed the labs  Consideration was given for COVID testing  He was treated at that time for sinusitis with doxycycline which he is still taking  This morning patient complains of fever to 101, chills and sweats    He complains of headache, fatigue and body aches   He admits to continued cough productive of green mucus  He denies shortness of breath  Appetite remains good  Patient denies any recent travel or known ill contacts  He was out of work last week until returning to work today and he works in a warehouse  He has treated this with DayQuil with some relief  Discussed diagnostic and treatment options with patient  Patient appears fatigued but no acute distress  Breathing is unlabored and color is good  Patient is being sent for COVID-19 testing and chest x-ray PA and lateral stat  Patient instructed to finish doxycycline may take Robitussin DM and Tylenol p r n  Kayla Anderson Patient to avoid ibuprofen  He is encouraged to drink plenty of fluids and rest   Patient to remain out of work this week until results of COVID-19 testing known  Patient instructed on social isolation and self quarantine for the next 7 days pending COVID-19 testing  Follow-up next week or call sooner p r n  Or report to the emergency room for worsening symptoms including shortness of breath or respiratory distress        Past Medical History:   Diagnosis Date    Asthma        Past Surgical History:   Procedure Laterality Date    NO PAST SURGERIES         Current Outpatient Medications   Medication Sig Dispense Refill    albuterol (ACCUNEB) 1 25 MG/3ML nebulizer solution Inhale 1 ampule every 6 (six) hours as needed      albuterol (VENTOLIN HFA) 90 mcg/act inhaler Inhale      diphenhydrAMINE (BENADRYL ALLERGY) 25 mg capsule Take 1 tablet by mouth every 4 (four) hours as needed      doxycycline hyclate (VIBRAMYCIN) 100 mg capsule Take 1 capsule (100 mg total) by mouth every 12 (twelve) hours for 7 days 14 capsule 0    EPINEPHrine (EPIPEN 2-CUBA) 0 3 mg/0 3 mL SOAJ EpiPen 2-C[place] 0 3 MG/0 3ML Injection Solution Auto-injector  Use as directed, prn allergic reaction   Quantity: 1;  Refills: 1      Quincy Ignacio DO ;  Start 31-July-2014  Active  2 Solution Auto-injector Pen      meloxicam (MOBIC) 15 mg tablet Take 1 tablet (15 mg total) by mouth daily (Patient not taking: Reported on 12/4/2019) 14 tablet 0    mometasone (ELOCON) 0 1 % cream Apply topically daily (Patient not taking: Reported on 12/4/2019) 45 g 0    montelukast (SINGULAIR) 10 mg tablet Take 1 tablet (10 mg total) by mouth daily at bedtime 30 tablet 1    rizatriptan (MAXALT) 10 MG tablet Take 1 tablet (10 mg total) by mouth once as needed for migraine for up to 1 dose May repeat in 2 hours if needed (Patient not taking: Reported on 12/4/2019) 27 tablet 1     No current facility-administered medications for this visit  Allergies   Allergen Reactions    Nuts Anaphylaxis    Shellfish-Derived Products Anaphylaxis    Other      Tree nuts  Yogurt  Carrots  Lymes    Peanut Oil        Review of Systems    Physical Exam     I spent 20 minutes with the patient during this visit

## 2020-03-31 DIAGNOSIS — R05.9 COUGH: ICD-10-CM

## 2020-03-31 DIAGNOSIS — R50.9 FEVER, UNSPECIFIED FEVER CAUSE: ICD-10-CM

## 2020-03-31 PROCEDURE — 87635 SARS-COV-2 COVID-19 AMP PRB: CPT

## 2020-04-03 LAB — SARS-COV-2 RNA SPEC QL NAA+PROBE: NOT DETECTED

## 2020-04-10 ENCOUNTER — TELEMEDICINE (OUTPATIENT)
Dept: FAMILY MEDICINE CLINIC | Facility: CLINIC | Age: 20
End: 2020-04-10
Payer: COMMERCIAL

## 2020-04-10 DIAGNOSIS — J06.9 UPPER RESPIRATORY TRACT INFECTION, UNSPECIFIED TYPE: Primary | ICD-10-CM

## 2020-04-10 PROCEDURE — 99213 OFFICE O/P EST LOW 20 MIN: CPT | Performed by: FAMILY MEDICINE

## 2020-04-10 RX ORDER — AZITHROMYCIN 250 MG/1
TABLET, FILM COATED ORAL
Qty: 6 TABLET | Refills: 0 | Status: SHIPPED | OUTPATIENT
Start: 2020-04-10 | End: 2020-04-14

## 2020-04-27 ENCOUNTER — TELEMEDICINE (OUTPATIENT)
Dept: FAMILY MEDICINE CLINIC | Facility: CLINIC | Age: 20
End: 2020-04-27
Payer: COMMERCIAL

## 2020-04-27 DIAGNOSIS — J30.9 ALLERGIC RHINITIS, UNSPECIFIED SEASONALITY, UNSPECIFIED TRIGGER: ICD-10-CM

## 2020-04-27 DIAGNOSIS — J45.909 MILD ASTHMA WITHOUT COMPLICATION, UNSPECIFIED WHETHER PERSISTENT: Primary | ICD-10-CM

## 2020-04-27 PROCEDURE — 99213 OFFICE O/P EST LOW 20 MIN: CPT | Performed by: FAMILY MEDICINE

## 2020-04-27 RX ORDER — FEXOFENADINE HCL 180 MG/1
180 TABLET ORAL DAILY
Qty: 30 TABLET | Refills: 1 | Status: SHIPPED | OUTPATIENT
Start: 2020-04-27 | End: 2020-12-29

## 2020-10-02 ENCOUNTER — OFFICE VISIT (OUTPATIENT)
Dept: FAMILY MEDICINE CLINIC | Facility: CLINIC | Age: 20
End: 2020-10-02
Payer: COMMERCIAL

## 2020-10-02 VITALS
WEIGHT: 175 LBS | OXYGEN SATURATION: 98 % | SYSTOLIC BLOOD PRESSURE: 140 MMHG | HEIGHT: 70 IN | HEART RATE: 90 BPM | TEMPERATURE: 96.6 F | BODY MASS INDEX: 25.05 KG/M2 | DIASTOLIC BLOOD PRESSURE: 100 MMHG

## 2020-10-02 DIAGNOSIS — R44.1 HALLUCINATIONS, VISUAL: ICD-10-CM

## 2020-10-02 DIAGNOSIS — J30.9 ALLERGIC RHINITIS, UNSPECIFIED SEASONALITY, UNSPECIFIED TRIGGER: ICD-10-CM

## 2020-10-02 DIAGNOSIS — K29.70 GASTRITIS WITHOUT BLEEDING, UNSPECIFIED CHRONICITY, UNSPECIFIED GASTRITIS TYPE: ICD-10-CM

## 2020-10-02 DIAGNOSIS — S16.1XXA STRAIN OF NECK MUSCLE, INITIAL ENCOUNTER: Primary | ICD-10-CM

## 2020-10-02 DIAGNOSIS — Z23 NEEDS FLU SHOT: ICD-10-CM

## 2020-10-02 PROBLEM — F98.8 ATTENTION DEFICIT DISORDER: Status: ACTIVE | Noted: 2020-10-02

## 2020-10-02 PROCEDURE — 1036F TOBACCO NON-USER: CPT | Performed by: FAMILY MEDICINE

## 2020-10-02 PROCEDURE — 99214 OFFICE O/P EST MOD 30 MIN: CPT | Performed by: FAMILY MEDICINE

## 2020-10-02 RX ORDER — FLUTICASONE PROPIONATE 50 MCG
2 SPRAY, SUSPENSION (ML) NASAL DAILY
Qty: 16 G | Refills: 0 | Status: SHIPPED | OUTPATIENT
Start: 2020-10-02 | End: 2021-07-23

## 2020-10-02 RX ORDER — FAMOTIDINE 20 MG/1
20 TABLET, FILM COATED ORAL 2 TIMES DAILY
Qty: 60 TABLET | Refills: 1 | Status: SHIPPED | OUTPATIENT
Start: 2020-10-02 | End: 2020-12-29

## 2020-10-26 ENCOUNTER — TELEPHONE (OUTPATIENT)
Dept: PSYCHIATRY | Facility: CLINIC | Age: 20
End: 2020-10-26

## 2020-10-30 ENCOUNTER — OFFICE VISIT (OUTPATIENT)
Dept: GASTROENTEROLOGY | Facility: AMBULARY SURGERY CENTER | Age: 20
End: 2020-10-30
Payer: COMMERCIAL

## 2020-10-30 VITALS — WEIGHT: 175.6 LBS | HEIGHT: 69 IN | TEMPERATURE: 97.6 F | BODY MASS INDEX: 26.01 KG/M2

## 2020-10-30 DIAGNOSIS — R10.13 DYSPEPSIA: Primary | ICD-10-CM

## 2020-10-30 DIAGNOSIS — R19.7 DIARRHEA, UNSPECIFIED TYPE: ICD-10-CM

## 2020-10-30 PROCEDURE — 99244 OFF/OP CNSLTJ NEW/EST MOD 40: CPT | Performed by: INTERNAL MEDICINE

## 2020-10-30 PROCEDURE — 3008F BODY MASS INDEX DOCD: CPT | Performed by: INTERNAL MEDICINE

## 2020-10-30 RX ORDER — DICYCLOMINE HCL 20 MG
20 TABLET ORAL 3 TIMES DAILY PRN
Qty: 30 TABLET | Refills: 2 | Status: SHIPPED | OUTPATIENT
Start: 2020-10-30 | End: 2020-10-30 | Stop reason: SDUPTHER

## 2020-10-30 RX ORDER — DICYCLOMINE HCL 20 MG
20 TABLET ORAL 3 TIMES DAILY PRN
Qty: 30 TABLET | Refills: 2 | Status: SHIPPED | OUTPATIENT
Start: 2020-10-30 | End: 2020-12-29

## 2020-10-30 RX ORDER — SODIUM, POTASSIUM,MAG SULFATES 17.5-3.13G
2 SOLUTION, RECONSTITUTED, ORAL ORAL SEE ADMIN INSTRUCTIONS
Qty: 2 BOTTLE | Refills: 0 | Status: SHIPPED | OUTPATIENT
Start: 2020-10-30 | End: 2021-12-13

## 2020-11-04 ENCOUNTER — TELEPHONE (OUTPATIENT)
Dept: GASTROENTEROLOGY | Facility: AMBULARY SURGERY CENTER | Age: 20
End: 2020-11-04

## 2020-12-10 ENCOUNTER — APPOINTMENT (EMERGENCY)
Dept: ULTRASOUND IMAGING | Facility: HOSPITAL | Age: 20
End: 2020-12-10
Payer: COMMERCIAL

## 2020-12-10 ENCOUNTER — HOSPITAL ENCOUNTER (EMERGENCY)
Facility: HOSPITAL | Age: 20
Discharge: HOME | End: 2020-12-11
Attending: EMERGENCY MEDICINE | Admitting: EMERGENCY MEDICINE
Payer: COMMERCIAL

## 2020-12-10 DIAGNOSIS — N50.812 PAIN IN LEFT TESTICLE: Primary | ICD-10-CM

## 2020-12-10 DIAGNOSIS — R10.32 LLQ ABDOMINAL PAIN: ICD-10-CM

## 2020-12-10 PROCEDURE — 99285 EMERGENCY DEPT VISIT HI MDM: CPT | Performed by: EMERGENCY MEDICINE

## 2020-12-10 PROCEDURE — 99284 EMERGENCY DEPT VISIT MOD MDM: CPT

## 2020-12-10 PROCEDURE — 76870 US EXAM SCROTUM: CPT

## 2020-12-10 RX ORDER — KETOROLAC TROMETHAMINE 30 MG/ML
30 INJECTION, SOLUTION INTRAMUSCULAR; INTRAVENOUS ONCE
Status: DISCONTINUED | OUTPATIENT
Start: 2020-12-10 | End: 2020-12-11 | Stop reason: HOSPADM

## 2020-12-11 ENCOUNTER — APPOINTMENT (EMERGENCY)
Dept: CT IMAGING | Facility: HOSPITAL | Age: 20
End: 2020-12-11
Payer: COMMERCIAL

## 2020-12-11 VITALS
WEIGHT: 174.82 LBS | BODY MASS INDEX: 25.82 KG/M2 | TEMPERATURE: 98.6 F | RESPIRATION RATE: 16 BRPM | SYSTOLIC BLOOD PRESSURE: 152 MMHG | OXYGEN SATURATION: 99 % | HEART RATE: 72 BPM | DIASTOLIC BLOOD PRESSURE: 65 MMHG

## 2020-12-11 LAB
BILIRUB UR QL STRIP: NEGATIVE
CLARITY UR: CLEAR
CLARITY, POC: NORMAL
COLOR UR: YELLOW
COLOR, POC: NORMAL
GLUCOSE UR STRIP-MCNC: NEGATIVE MG/DL
HGB UR QL STRIP.AUTO: NEGATIVE
KETONES UR STRIP-MCNC: NEGATIVE MG/DL
LEUKOCYTE ESTERASE UR QL STRIP: NEGATIVE
NITRITE UR QL STRIP: NEGATIVE
PH UR STRIP.AUTO: 6 [PH] (ref 4.5–8)
PROT UR STRIP-MCNC: NEGATIVE MG/DL
SP GR UR STRIP.AUTO: 1.02 (ref 1–1.03)
UROBILINOGEN UR QL STRIP.AUTO: 0.2 E.U./DL

## 2020-12-11 PROCEDURE — 81003 URINALYSIS AUTO W/O SCOPE: CPT

## 2020-12-11 PROCEDURE — G1004 CDSM NDSC: HCPCS

## 2020-12-11 PROCEDURE — 74176 CT ABD & PELVIS W/O CONTRAST: CPT

## 2020-12-14 ENCOUNTER — TELEPHONE (OUTPATIENT)
Dept: UROLOGY | Facility: MEDICAL CENTER | Age: 20
End: 2020-12-14

## 2020-12-17 ENCOUNTER — TELEMEDICINE (OUTPATIENT)
Dept: UROLOGY | Facility: MEDICAL CENTER | Age: 20
End: 2020-12-17
Payer: COMMERCIAL

## 2020-12-17 DIAGNOSIS — R10.9 ABDOMINAL PAIN, UNSPECIFIED ABDOMINAL LOCATION: Primary | ICD-10-CM

## 2020-12-17 PROCEDURE — 99242 OFF/OP CONSLTJ NEW/EST SF 20: CPT | Performed by: NURSE PRACTITIONER

## 2020-12-21 ENCOUNTER — TELEPHONE (OUTPATIENT)
Dept: GASTROENTEROLOGY | Facility: CLINIC | Age: 20
End: 2020-12-21

## 2020-12-21 ENCOUNTER — TELEPHONE (OUTPATIENT)
Dept: UROLOGY | Facility: MEDICAL CENTER | Age: 20
End: 2020-12-21

## 2020-12-21 DIAGNOSIS — N45.1 EPIDIDYMITIS: Primary | ICD-10-CM

## 2020-12-21 RX ORDER — DOXYCYCLINE 100 MG/1
100 TABLET ORAL 2 TIMES DAILY
Qty: 28 TABLET | Refills: 1 | Status: SHIPPED | OUTPATIENT
Start: 2020-12-21 | End: 2021-01-04

## 2020-12-29 ENCOUNTER — OFFICE VISIT (OUTPATIENT)
Dept: FAMILY MEDICINE CLINIC | Facility: CLINIC | Age: 20
End: 2020-12-29
Payer: COMMERCIAL

## 2020-12-29 VITALS
RESPIRATION RATE: 18 BRPM | TEMPERATURE: 97.8 F | OXYGEN SATURATION: 98 % | BODY MASS INDEX: 25.62 KG/M2 | DIASTOLIC BLOOD PRESSURE: 90 MMHG | HEART RATE: 103 BPM | WEIGHT: 173 LBS | HEIGHT: 69 IN | SYSTOLIC BLOOD PRESSURE: 132 MMHG

## 2020-12-29 DIAGNOSIS — R00.2 PALPITATIONS: ICD-10-CM

## 2020-12-29 DIAGNOSIS — I10 ESSENTIAL HYPERTENSION: Primary | ICD-10-CM

## 2020-12-29 DIAGNOSIS — R10.9 FLANK PAIN: ICD-10-CM

## 2020-12-29 DIAGNOSIS — M54.50 LUMBAR PAIN: ICD-10-CM

## 2020-12-29 LAB
SL AMB  POCT GLUCOSE, UA: NORMAL
SL AMB LEUKOCYTE ESTERASE,UA: ABNORMAL
SL AMB POCT BILIRUBIN,UA: NEGATIVE
SL AMB POCT BLOOD,UA: NEGATIVE
SL AMB POCT CLARITY,UA: CLEAR
SL AMB POCT COLOR,UA: YELLOW
SL AMB POCT KETONES,UA: NEGATIVE
SL AMB POCT NITRITE,UA: NEGATIVE
SL AMB POCT PH,UA: 6
SL AMB POCT SPECIFIC GRAVITY,UA: 1.01
SL AMB POCT URINE PROTEIN: ABNORMAL
SL AMB POCT UROBILINOGEN: NORMAL

## 2020-12-29 PROCEDURE — 3008F BODY MASS INDEX DOCD: CPT | Performed by: FAMILY MEDICINE

## 2020-12-29 PROCEDURE — 3080F DIAST BP >= 90 MM HG: CPT | Performed by: FAMILY MEDICINE

## 2020-12-29 PROCEDURE — 3725F SCREEN DEPRESSION PERFORMED: CPT | Performed by: FAMILY MEDICINE

## 2020-12-29 PROCEDURE — 99214 OFFICE O/P EST MOD 30 MIN: CPT | Performed by: FAMILY MEDICINE

## 2020-12-29 PROCEDURE — 81002 URINALYSIS NONAUTO W/O SCOPE: CPT | Performed by: FAMILY MEDICINE

## 2020-12-29 PROCEDURE — 3075F SYST BP GE 130 - 139MM HG: CPT | Performed by: FAMILY MEDICINE

## 2020-12-29 PROCEDURE — 1036F TOBACCO NON-USER: CPT | Performed by: FAMILY MEDICINE

## 2020-12-29 RX ORDER — VALSARTAN 80 MG/1
80 TABLET ORAL DAILY
Qty: 90 TABLET | Refills: 1 | Status: SHIPPED | OUTPATIENT
Start: 2020-12-29 | End: 2021-06-18

## 2020-12-29 RX ORDER — CLINDAMYCIN HYDROCHLORIDE 300 MG/1
300 CAPSULE ORAL 3 TIMES DAILY
COMMUNITY
Start: 2020-11-11 | End: 2020-12-29

## 2020-12-30 ENCOUNTER — TELEPHONE (OUTPATIENT)
Dept: OTHER | Facility: OTHER | Age: 20
End: 2020-12-30

## 2020-12-30 ENCOUNTER — TELEPHONE (OUTPATIENT)
Dept: FAMILY MEDICINE CLINIC | Facility: CLINIC | Age: 20
End: 2020-12-30

## 2020-12-30 ENCOUNTER — LAB (OUTPATIENT)
Dept: LAB | Facility: MEDICAL CENTER | Age: 20
End: 2020-12-30
Payer: COMMERCIAL

## 2020-12-30 DIAGNOSIS — R00.2 PALPITATIONS: ICD-10-CM

## 2020-12-30 DIAGNOSIS — R10.9 FLANK PAIN: ICD-10-CM

## 2020-12-30 LAB
ALBUMIN SERPL BCP-MCNC: 4.5 G/DL (ref 3.5–5)
ALP SERPL-CCNC: 55 U/L (ref 46–116)
ALT SERPL W P-5'-P-CCNC: 34 U/L (ref 12–78)
ANION GAP SERPL CALCULATED.3IONS-SCNC: 5 MMOL/L (ref 4–13)
AST SERPL W P-5'-P-CCNC: 20 U/L (ref 5–45)
BASOPHILS # BLD AUTO: 0.05 THOUSANDS/ΜL (ref 0–0.1)
BASOPHILS NFR BLD AUTO: 1 % (ref 0–1)
BILIRUB SERPL-MCNC: 0.75 MG/DL (ref 0.2–1)
BUN SERPL-MCNC: 15 MG/DL (ref 5–25)
CALCIUM SERPL-MCNC: 9.5 MG/DL (ref 8.3–10.1)
CHLORIDE SERPL-SCNC: 105 MMOL/L (ref 100–108)
CHOLEST SERPL-MCNC: 222 MG/DL (ref 50–200)
CO2 SERPL-SCNC: 29 MMOL/L (ref 21–32)
CREAT SERPL-MCNC: 0.89 MG/DL (ref 0.6–1.3)
EOSINOPHIL # BLD AUTO: 0.21 THOUSAND/ΜL (ref 0–0.61)
EOSINOPHIL NFR BLD AUTO: 3 % (ref 0–6)
ERYTHROCYTE [DISTWIDTH] IN BLOOD BY AUTOMATED COUNT: 11.9 % (ref 11.6–15.1)
GFR SERPL CREATININE-BSD FRML MDRD: 121 ML/MIN/1.73SQ M
GLUCOSE P FAST SERPL-MCNC: 83 MG/DL (ref 65–99)
HCT VFR BLD AUTO: 46.7 % (ref 36.5–49.3)
HDLC SERPL-MCNC: 54 MG/DL
HGB BLD-MCNC: 15.4 G/DL (ref 12–17)
IMM GRANULOCYTES # BLD AUTO: 0.02 THOUSAND/UL (ref 0–0.2)
IMM GRANULOCYTES NFR BLD AUTO: 0 % (ref 0–2)
LDLC SERPL CALC-MCNC: 147 MG/DL (ref 0–100)
LYMPHOCYTES # BLD AUTO: 3.18 THOUSANDS/ΜL (ref 0.6–4.47)
LYMPHOCYTES NFR BLD AUTO: 41 % (ref 14–44)
MCH RBC QN AUTO: 29.6 PG (ref 26.8–34.3)
MCHC RBC AUTO-ENTMCNC: 33 G/DL (ref 31.4–37.4)
MCV RBC AUTO: 90 FL (ref 82–98)
MONOCYTES # BLD AUTO: 0.77 THOUSAND/ΜL (ref 0.17–1.22)
MONOCYTES NFR BLD AUTO: 10 % (ref 4–12)
NEUTROPHILS # BLD AUTO: 3.57 THOUSANDS/ΜL (ref 1.85–7.62)
NEUTS SEG NFR BLD AUTO: 45 % (ref 43–75)
NRBC BLD AUTO-RTO: 0 /100 WBCS
PLATELET # BLD AUTO: 358 THOUSANDS/UL (ref 149–390)
PMV BLD AUTO: 9.7 FL (ref 8.9–12.7)
POTASSIUM SERPL-SCNC: 4.1 MMOL/L (ref 3.5–5.3)
PROT SERPL-MCNC: 8 G/DL (ref 6.4–8.2)
RBC # BLD AUTO: 5.2 MILLION/UL (ref 3.88–5.62)
SODIUM SERPL-SCNC: 139 MMOL/L (ref 136–145)
TRIGL SERPL-MCNC: 104 MG/DL
TSH SERPL DL<=0.05 MIU/L-ACNC: 2.86 UIU/ML (ref 0.36–3.74)
WBC # BLD AUTO: 7.8 THOUSAND/UL (ref 4.31–10.16)

## 2020-12-30 PROCEDURE — 36415 COLL VENOUS BLD VENIPUNCTURE: CPT | Performed by: FAMILY MEDICINE

## 2020-12-30 PROCEDURE — 80053 COMPREHEN METABOLIC PANEL: CPT | Performed by: FAMILY MEDICINE

## 2020-12-30 PROCEDURE — 85025 COMPLETE CBC W/AUTO DIFF WBC: CPT | Performed by: FAMILY MEDICINE

## 2020-12-30 PROCEDURE — 80061 LIPID PANEL: CPT | Performed by: FAMILY MEDICINE

## 2020-12-30 PROCEDURE — 84443 ASSAY THYROID STIM HORMONE: CPT

## 2020-12-30 RX ORDER — LIDOCAINE HYDROCHLORIDE 10 MG/ML
0.5 INJECTION, SOLUTION EPIDURAL; INFILTRATION; INTRACAUDAL; PERINEURAL ONCE AS NEEDED
Status: CANCELLED | OUTPATIENT
Start: 2020-12-30

## 2020-12-30 RX ORDER — SODIUM CHLORIDE, SODIUM LACTATE, POTASSIUM CHLORIDE, CALCIUM CHLORIDE 600; 310; 30; 20 MG/100ML; MG/100ML; MG/100ML; MG/100ML
125 INJECTION, SOLUTION INTRAVENOUS CONTINUOUS
Status: CANCELLED | OUTPATIENT
Start: 2020-12-30

## 2020-12-30 NOTE — TELEPHONE ENCOUNTER
----- Message from Ellen Hummel sent at 12/30/2020  5:46 PM EST -----  Regarding: Test Results Question  Contact: [phone]  I have a question about CBC WITH DIFFERENTIAL resulted on 12/30/20, 5:41 PM   How does the urine test look? Is everything okay?  I got the results on my chart but don't know how to read them

## 2020-12-31 ENCOUNTER — HOSPITAL ENCOUNTER (OUTPATIENT)
Dept: GASTROENTEROLOGY | Facility: AMBULARY SURGERY CENTER | Age: 20
Setting detail: OUTPATIENT SURGERY
Discharge: HOME | End: 2020-12-31
Attending: INTERNAL MEDICINE

## 2020-12-31 ENCOUNTER — TELEPHONE (OUTPATIENT)
Dept: GASTROENTEROLOGY | Facility: AMBULARY SURGERY CENTER | Age: 20
End: 2020-12-31

## 2020-12-31 DIAGNOSIS — K59.09 CHRONIC CONSTIPATION: Primary | ICD-10-CM

## 2020-12-31 RX ORDER — MAGNESIUM CARB/ALUMINUM HYDROX 105-160MG
296 TABLET,CHEWABLE ORAL ONCE
Qty: 296 ML | Refills: 0 | Status: SHIPPED | OUTPATIENT
Start: 2020-12-31 | End: 2021-12-13

## 2021-01-04 ENCOUNTER — TELEPHONE (OUTPATIENT)
Dept: GASTROENTEROLOGY | Facility: CLINIC | Age: 21
End: 2021-01-04

## 2021-01-04 NOTE — TELEPHONE ENCOUNTER
Roger Evans phoned   The pharmacy does not have go lightly bowel prep     Can the generic be substituted?   Please phone [address] [facility] @ [phone]

## 2021-01-04 NOTE — TELEPHONE ENCOUNTER
[place],  Can you please call the patient with these instructions? Then I can mail also    Thank you

## 2021-01-18 ENCOUNTER — TELEMEDICINE (OUTPATIENT)
Dept: FAMILY MEDICINE CLINIC | Facility: CLINIC | Age: 21
End: 2021-01-18
Payer: COMMERCIAL

## 2021-01-18 DIAGNOSIS — J40 BRONCHITIS: Primary | ICD-10-CM

## 2021-01-18 DIAGNOSIS — J45.40 MODERATE PERSISTENT ASTHMA, UNSPECIFIED WHETHER COMPLICATED: ICD-10-CM

## 2021-01-18 PROCEDURE — 1036F TOBACCO NON-USER: CPT | Performed by: FAMILY MEDICINE

## 2021-01-18 PROCEDURE — 99214 OFFICE O/P EST MOD 30 MIN: CPT | Performed by: FAMILY MEDICINE

## 2021-01-18 RX ORDER — AZITHROMYCIN 250 MG/1
TABLET, FILM COATED ORAL
Qty: 6 TABLET | Refills: 0 | Status: SHIPPED | OUTPATIENT
Start: 2021-01-18 | End: 2021-01-25

## 2021-01-18 RX ORDER — ALBUTEROL SULFATE 90 UG/1
2 AEROSOL, METERED RESPIRATORY (INHALATION) EVERY 6 HOURS PRN
Qty: 2 INHALER | Refills: 0 | Status: SHIPPED | OUTPATIENT
Start: 2021-01-18 | End: 2021-04-06 | Stop reason: SDUPTHER

## 2021-01-18 RX ORDER — PREDNISONE 10 MG/1
TABLET ORAL
Qty: 33 TABLET | Refills: 0 | Status: SHIPPED | OUTPATIENT
Start: 2021-01-18 | End: 2021-03-05 | Stop reason: ALTCHOICE

## 2021-01-18 NOTE — PROGRESS NOTES
Virtual Regular Visit      Assessment/Plan:  Time spent counseling was 15 of the 25 minutes visit  Recommend prednisone  Consider Covid testing  Patient deferred COVID testing at this time  Consider chest x-ray if needed  Patient will call if any new persisting or worsening symptoms  Problem List Items Addressed This Visit        Respiratory    Asthma    Relevant Medications    albuterol (Ventolin HFA) 90 mcg/act inhaler    predniSONE 10 mg tablet      Other Visit Diagnoses     Bronchitis    -  Primary    Relevant Medications    albuterol (Ventolin HFA) 90 mcg/act inhaler    azithromycin (ZITHROMAX) 250 mg tablet    predniSONE 10 mg tablet               Reason for visit is No chief complaint on file  Encounter provider Denver Hogue DO    Provider located at [address] [place] [address] [address] [phone]      Recent Visits  No visits were found meeting these conditions  Showing recent visits within past 7 days and meeting all other requirements     Future Appointments  No visits were found meeting these conditions  Showing future appointments within next 150 days and meeting all other requirements        The patient was identified by name and date of birth  Catia Cluster was informed that this is a telemedicine visit and that the visit is being conducted through [address] [place] [address] and patient was informed that this is not a secure, HIPAA-compliant platform  He agrees to proceed     My office door was closed  No one else was in the room  He acknowledged consent and understanding of privacy and security of the video platform  The patient has agreed to participate and understands they can discontinue the visit at any time  Patient is aware this is a billable service  Loida Torres is a 23 y o  male for cough and congestion   Patient with bronchitis symptoms and has history of asthma  Symptoms are mild  Cough is nonproductive    No loss of taste or smell  He is out of his inhaler  No fevers  Past Medical History:   Diagnosis Date    Asthma     Asthma due to seasonal allergies     GERD (gastroesophageal reflux disease)        Past Surgical History:   Procedure Laterality Date    NO PAST SURGERIES      WISDOM TOOTH EXTRACTION         Current Outpatient Medications   Medication Sig Dispense Refill    albuterol (ACCUNEB) 1 25 MG/3ML nebulizer solution Inhale 1 ampule every 6 (six) hours as needed      albuterol (Ventolin HFA) 90 mcg/act inhaler Inhale 2 puffs every 6 (six) hours as needed for wheezing 2 Inhaler 0    azithromycin (ZITHROMAX) 250 mg tablet Take 2 tablets today then 1 tablet daily x 4 days 6 tablet 0    EPINEPHrine (EPIPEN 2-CUBA) 0 3 mg/0 3 mL SOAJ EpiPen 2-C[place] 0 3 MG/0 3ML Injection Solution Auto-injector  Use as directed, prn allergic reaction   Quantity: 1;  Refills: 1      Quincy Ignacio DO ;  Start 31-July-2014  Active  2 Solution Auto-injector Pen      fluticasone (FLONASE) 50 mcg/act nasal spray 2 sprays into each nostril daily 16 g 0    magnesium citrate (CITROMA) 1 745 g/30 mL oral solution Take 296 mL by mouth once for 1 dose 296 mL 0    Na Sulfate-K Sulfate-Mg Sulf (Suprep Bowel Prep Kit) 17 5-3 13-1 6 GM/177ML SOLN Take 2 Bottles by mouth see administration instructions Please follow the instructions from the office (Patient not taking: Reported on 12/29/2020) 2 Bottle 0    polyethylene glycol (GOLYTELY) 4000 mL solution Take 4,000 mL by mouth once for 1 dose 4000 mL 0    predniSONE 10 mg tablet 6 tablets daily, all at one time, with food  Then on day #4 decrease by 1 pill each day until finished  33 tablet 0    valsartan (DIOVAN) 80 mg tablet Take 1 tablet (80 mg total) by mouth daily 90 tablet 1     No current facility-administered medications for this visit           Allergies   Allergen Reactions    Nuts Anaphylaxis    Shellfish-Derived Products Anaphylaxis    Other      Tree nuts  Yogurt  Carrots  Lymes  Peanut Oil     Penicillins      Family allergy        Review of Systems   Constitutional: Negative  Negative for fever  HENT: Negative  Negative for congestion  Eyes: Negative  Respiratory: Positive for cough  Negative for shortness of breath  Cardiovascular: Negative  Gastrointestinal: Negative  Endocrine: Negative  Genitourinary: Negative  Musculoskeletal: Negative  Skin: Negative  Allergic/Immunologic: Negative  Neurological: Negative  Hematological: Negative  Psychiatric/Behavioral: Negative  Video Exam    There were no vitals filed for this visit  Physical Exam  Constitutional:       General: He is not in acute distress  Appearance: He is well-developed  He is not diaphoretic  Neurological:      Mental Status: He is alert and oriented to person, place, and time  Psychiatric:         Behavior: Behavior normal          Thought Content: Thought content normal          Judgment: Judgment normal           I spent 25 minutes directly with the patient during this visit      Miko Oh acknowledges that he has consented to an online visit or consultation  He understands that the online visit is based solely on information provided by him, and that, in the absence of a face-to-face physical evaluation by the physician, the diagnosis he receives is both limited and provisional in terms of accuracy and completeness  This is not intended to replace a full medical face-to-face evaluation by the physician  Darling Burris understands and accepts these terms

## 2021-01-25 DIAGNOSIS — J30.9 ALLERGIC RHINITIS, UNSPECIFIED SEASONALITY, UNSPECIFIED TRIGGER: ICD-10-CM

## 2021-01-26 RX ORDER — FLUTICASONE PROPIONATE 50 MCG
SPRAY, SUSPENSION (ML) NASAL
Qty: 16 G | Refills: 0 | OUTPATIENT
Start: 2021-01-26

## 2021-02-04 ENCOUNTER — ANESTHESIA EVENT (OUTPATIENT)
Dept: GASTROENTEROLOGY | Facility: AMBULARY SURGERY CENTER | Age: 21
End: 2021-02-04

## 2021-02-17 ENCOUNTER — HOSPITAL ENCOUNTER (OUTPATIENT)
Dept: GASTROENTEROLOGY | Facility: AMBULARY SURGERY CENTER | Age: 21
Setting detail: OUTPATIENT SURGERY
Discharge: HOME | End: 2021-02-17
Attending: INTERNAL MEDICINE
Payer: COMMERCIAL

## 2021-02-17 ENCOUNTER — ANESTHESIA (OUTPATIENT)
Dept: GASTROENTEROLOGY | Facility: AMBULARY SURGERY CENTER | Age: 21
End: 2021-02-17

## 2021-02-17 VITALS
WEIGHT: 163 LBS | HEART RATE: 90 BPM | BODY MASS INDEX: 24.14 KG/M2 | TEMPERATURE: 98 F | RESPIRATION RATE: 16 BRPM | HEIGHT: 69 IN | OXYGEN SATURATION: 100 % | DIASTOLIC BLOOD PRESSURE: 51 MMHG | SYSTOLIC BLOOD PRESSURE: 100 MMHG

## 2021-02-17 VITALS — HEART RATE: 94 BPM

## 2021-02-17 DIAGNOSIS — R10.13 DYSPEPSIA: ICD-10-CM

## 2021-02-17 DIAGNOSIS — R19.7 DIARRHEA, UNSPECIFIED TYPE: ICD-10-CM

## 2021-02-17 PROCEDURE — 43239 EGD BIOPSY SINGLE/MULTIPLE: CPT | Performed by: INTERNAL MEDICINE

## 2021-02-17 PROCEDURE — 88305 TISSUE EXAM BY PATHOLOGIST: CPT | Performed by: PATHOLOGY

## 2021-02-17 PROCEDURE — 45380 COLONOSCOPY AND BIOPSY: CPT | Performed by: INTERNAL MEDICINE

## 2021-02-17 RX ORDER — LIDOCAINE HYDROCHLORIDE 20 MG/ML
INJECTION, SOLUTION INFILTRATION; PERINEURAL AS NEEDED
Status: DISCONTINUED | OUTPATIENT
Start: 2021-02-17 | End: 2021-02-17

## 2021-02-17 RX ORDER — PROPOFOL 10 MG/ML
INJECTION, EMULSION INTRAVENOUS AS NEEDED
Status: DISCONTINUED | OUTPATIENT
Start: 2021-02-17 | End: 2021-02-17

## 2021-02-17 RX ORDER — SIMETHICONE 20 MG/.3ML
EMULSION ORAL CODE/TRAUMA/SEDATION MEDICATION
Status: COMPLETED | OUTPATIENT
Start: 2021-02-17 | End: 2021-02-17

## 2021-02-17 RX ORDER — KETAMINE HCL IN NACL, ISO-OSM 100MG/10ML
SYRINGE (ML) INJECTION AS NEEDED
Status: DISCONTINUED | OUTPATIENT
Start: 2021-02-17 | End: 2021-02-17

## 2021-02-17 RX ORDER — SODIUM CHLORIDE, SODIUM LACTATE, POTASSIUM CHLORIDE, CALCIUM CHLORIDE 600; 310; 30; 20 MG/100ML; MG/100ML; MG/100ML; MG/100ML
INJECTION, SOLUTION INTRAVENOUS CONTINUOUS PRN
Status: DISCONTINUED | OUTPATIENT
Start: 2021-02-17 | End: 2021-02-17

## 2021-02-17 RX ADMIN — PROPOFOL 50 MG: 10 INJECTION, EMULSION INTRAVENOUS at 08:25

## 2021-02-17 RX ADMIN — PROPOFOL 50 MG: 10 INJECTION, EMULSION INTRAVENOUS at 08:17

## 2021-02-17 RX ADMIN — Medication 20 MG: at 08:07

## 2021-02-17 RX ADMIN — PROPOFOL 20 MG: 10 INJECTION, EMULSION INTRAVENOUS at 08:15

## 2021-02-17 RX ADMIN — Medication 40 MG: at 08:18

## 2021-02-17 RX ADMIN — PROPOFOL 50 MG: 10 INJECTION, EMULSION INTRAVENOUS at 08:09

## 2021-02-17 RX ADMIN — SODIUM CHLORIDE, SODIUM LACTATE, POTASSIUM CHLORIDE, AND CALCIUM CHLORIDE: .6; .31; .03; .02 INJECTION, SOLUTION INTRAVENOUS at 07:57

## 2021-02-17 RX ADMIN — PROPOFOL 50 MG: 10 INJECTION, EMULSION INTRAVENOUS at 08:27

## 2021-02-17 RX ADMIN — PROPOFOL 100 MG: 10 INJECTION, EMULSION INTRAVENOUS at 08:08

## 2021-02-17 RX ADMIN — PROPOFOL 30 MG: 10 INJECTION, EMULSION INTRAVENOUS at 08:22

## 2021-02-17 RX ADMIN — PROPOFOL 30 MG: 10 INJECTION, EMULSION INTRAVENOUS at 08:13

## 2021-02-17 RX ADMIN — PROPOFOL 30 MG: 10 INJECTION, EMULSION INTRAVENOUS at 08:11

## 2021-02-17 RX ADMIN — PROPOFOL 50 MG: 10 INJECTION, EMULSION INTRAVENOUS at 08:20

## 2021-02-17 RX ADMIN — PROPOFOL 20 MG: 10 INJECTION, EMULSION INTRAVENOUS at 08:19

## 2021-02-17 RX ADMIN — PROPOFOL 30 MG: 10 INJECTION, EMULSION INTRAVENOUS at 08:23

## 2021-02-17 RX ADMIN — PROPOFOL 150 MG: 10 INJECTION, EMULSION INTRAVENOUS at 08:07

## 2021-02-17 RX ADMIN — LIDOCAINE HYDROCHLORIDE 5 ML: 20 INJECTION, SOLUTION INFILTRATION; PERINEURAL at 08:07

## 2021-02-17 NOTE — ANESTHESIA PREPROCEDURE EVALUATION
Procedure:  COLONOSCOPY  EGD    Relevant Problems   CARDIO   (+) Essential hypertension      PULMONARY   (+) Asthma        Physical Exam    Airway    Mallampati score: II  TM Distance: >3 FB  Neck ROM: full     Dental   No notable dental hx     Cardiovascular  Rhythm: regular, Rate: normal, Cardiovascular exam normal    Pulmonary  Pulmonary exam normal     Other Findings        Anesthesia Plan  ASA Score- 2     Anesthesia Type- IV sedation with anesthesia with ASA Monitors  Additional Monitors:   Airway Plan:           Plan Factors-Exercise tolerance (METS): >4 METS  Chart reviewed  Patient summary reviewed  Induction- intravenous  Postoperative Plan-     Informed Consent- Anesthetic plan and risks discussed with patient  I personally reviewed this patient with the CRNA  Discussed and agreed on the Anesthesia Plan with the CRNA  Fawn Gay

## 2021-02-17 NOTE — H&P
History and Physical -  [facility]  Marisol Durbin 23 y o  male MRN: [id number]        HPI:  49-year-old male with symptoms of nausea, diarrhea after meal   Denies any blood or mucus in the stool  Historical Information   Past Medical History:   Diagnosis Date    Asthma     Asthma due to seasonal allergies     GERD (gastroesophageal reflux disease)     Hypertension      Past Surgical History:   Procedure Laterality Date    NO PAST SURGERIES      WISDOM TOOTH EXTRACTION       Social History   Social History     Substance and Sexual Activity   Alcohol Use Yes    Binge frequency: Less than monthly    Comment: Rare     Social History     Substance and Sexual Activity   Drug Use No     Social History     Tobacco Use   Smoking Status Former Smoker    Types: Cigarettes    Quit date: 2019    Years since quittin 2   Smokeless Tobacco Never Used     History reviewed  No pertinent family history  Meds/Allergies     (Not in a hospital admission)      Allergies   Allergen Reactions    Nuts Anaphylaxis    Shellfish-Derived Products Anaphylaxis    Other      Tree nuts  Yogurt  Carrots  Lymes    Peanut Oil     Penicillins      Family allergy        Objective     Blood pressure 144/79, pulse 83, temperature (!) 97 2 °F (36 2 °C), temperature source Temporal, resp  rate 18, height 5' 9" (1 753 m), weight 73 9 kg (163 lb), SpO2 100 %      PHYSICAL EXAM:    Gen: NAD  CV: S1 & S2 normal, RRR  CHEST: Clear to auscultate  ABD: soft, NT/ND, good bowel sounds  EXT: no edema    ASSESSMENT:     Nausea, diarrhea    PLAN:    EGD and colonoscopy

## 2021-02-17 NOTE — ANESTHESIA POSTPROCEDURE EVALUATION
Post-Op Assessment Note    CV Status:  Stable  Pain Score: 0    Pain management: adequate     Mental Status:  Awake and alert   Hydration Status:  Stable   PONV Controlled:  None   Airway Patency:  Patent and adequate      Post Op Vitals Reviewed: Yes      Staff: CRNA, Anesthesiologist         No complications documented      BP   102/56   Temp     Pulse  90   Resp   16   SpO2   99%

## 2021-02-19 ENCOUNTER — TELEPHONE (OUTPATIENT)
Dept: GASTROENTEROLOGY | Facility: AMBULARY SURGERY CENTER | Age: 21
End: 2021-02-19

## 2021-02-19 NOTE — TELEPHONE ENCOUNTER
----- Message from Jesus Raygoza sent at 2/18/2021  1:51 PM EST -----  Regarding: Test Results Question  Contact: [phone]  I have a question about TISSUE EXAM resulted on 2/18/21, 1:44 PM  The diagnosis finding (A) the first one  What does the inflammation mean? Should I get it looked at?

## 2021-02-19 NOTE — TELEPHONE ENCOUNTER
Spoke to patient , made him aware of results , he verbalized understanding , advised to call with any GI Symptoms

## 2021-02-19 NOTE — TELEPHONE ENCOUNTER
Reviewed patient's biopsy results  These were just resulted yesterday afternoon  Please advise patient he has some mild chronic inflammation in the stomach but no infection or ulcers  This could be from diet or excessive acid in the stomach  I would advise patient trial pepcid 20 mg OTC daily for about 2 months to see if he has any improvement in his dyspeptic symptoms  This will also help the inflammation resolve, after that he can stop and use as needed  Avoid NSAIDs and avoid a lot of acidic and spicy foods  No other work up needed for this  His other biopsies of the small intestine and colon were normal  No signs of inflammation or celiac disease  He should still have the blood work done that was ordered by Dr Rivas Jha  Please schedule him for follow up with PA in a few months

## 2021-02-24 ENCOUNTER — HOSPITAL ENCOUNTER (EMERGENCY)
Facility: HOSPITAL | Age: 21
Discharge: HOME | End: 2021-02-24
Attending: EMERGENCY MEDICINE | Admitting: EMERGENCY MEDICINE
Payer: COMMERCIAL

## 2021-02-24 VITALS
OXYGEN SATURATION: 99 % | SYSTOLIC BLOOD PRESSURE: 134 MMHG | TEMPERATURE: 99.3 F | RESPIRATION RATE: 18 BRPM | HEART RATE: 91 BPM | BODY MASS INDEX: 24.42 KG/M2 | WEIGHT: 165.34 LBS | DIASTOLIC BLOOD PRESSURE: 84 MMHG

## 2021-02-24 DIAGNOSIS — R10.9 INTERMITTENT ABDOMINAL PAIN: Primary | ICD-10-CM

## 2021-02-24 DIAGNOSIS — R11.0 NAUSEA: ICD-10-CM

## 2021-02-24 LAB
ALBUMIN SERPL BCP-MCNC: 4 G/DL (ref 3.5–5)
ALP SERPL-CCNC: 65 U/L (ref 46–116)
ALT SERPL W P-5'-P-CCNC: 31 U/L (ref 12–78)
ANION GAP SERPL CALCULATED.3IONS-SCNC: 8 MMOL/L (ref 4–13)
AST SERPL W P-5'-P-CCNC: 18 U/L (ref 5–45)
BASOPHILS # BLD AUTO: 0.04 THOUSANDS/ΜL (ref 0–0.1)
BASOPHILS NFR BLD AUTO: 1 % (ref 0–1)
BILIRUB SERPL-MCNC: 0.45 MG/DL (ref 0.2–1)
BUN SERPL-MCNC: 12 MG/DL (ref 5–25)
CALCIUM SERPL-MCNC: 8.9 MG/DL (ref 8.3–10.1)
CHLORIDE SERPL-SCNC: 105 MMOL/L (ref 100–108)
CO2 SERPL-SCNC: 26 MMOL/L (ref 21–32)
CREAT SERPL-MCNC: 0.85 MG/DL (ref 0.6–1.3)
EOSINOPHIL # BLD AUTO: 0.13 THOUSAND/ΜL (ref 0–0.61)
EOSINOPHIL NFR BLD AUTO: 2 % (ref 0–6)
ERYTHROCYTE [DISTWIDTH] IN BLOOD BY AUTOMATED COUNT: 11.9 % (ref 11.6–15.1)
GFR SERPL CREATININE-BSD FRML MDRD: 124 ML/MIN/1.73SQ M
GLUCOSE SERPL-MCNC: 110 MG/DL (ref 65–140)
HCT VFR BLD AUTO: 44.2 % (ref 36.5–49.3)
HGB BLD-MCNC: 14.7 G/DL (ref 12–17)
IMM GRANULOCYTES # BLD AUTO: 0.03 THOUSAND/UL (ref 0–0.2)
IMM GRANULOCYTES NFR BLD AUTO: 0 % (ref 0–2)
LIPASE SERPL-CCNC: 105 U/L (ref 73–393)
LYMPHOCYTES # BLD AUTO: 2.29 THOUSANDS/ΜL (ref 0.6–4.47)
LYMPHOCYTES NFR BLD AUTO: 32 % (ref 14–44)
MCH RBC QN AUTO: 30.1 PG (ref 26.8–34.3)
MCHC RBC AUTO-ENTMCNC: 33.3 G/DL (ref 31.4–37.4)
MCV RBC AUTO: 91 FL (ref 82–98)
MONOCYTES # BLD AUTO: 0.61 THOUSAND/ΜL (ref 0.17–1.22)
MONOCYTES NFR BLD AUTO: 9 % (ref 4–12)
NEUTROPHILS # BLD AUTO: 4.1 THOUSANDS/ΜL (ref 1.85–7.62)
NEUTS SEG NFR BLD AUTO: 56 % (ref 43–75)
NRBC BLD AUTO-RTO: 0 /100 WBCS
PLATELET # BLD AUTO: 322 THOUSANDS/UL (ref 149–390)
PMV BLD AUTO: 9 FL (ref 8.9–12.7)
POTASSIUM SERPL-SCNC: 3.5 MMOL/L (ref 3.5–5.3)
PROT SERPL-MCNC: 7.3 G/DL (ref 6.4–8.2)
RBC # BLD AUTO: 4.88 MILLION/UL (ref 3.88–5.62)
SODIUM SERPL-SCNC: 139 MMOL/L (ref 136–145)
WBC # BLD AUTO: 7.2 THOUSAND/UL (ref 4.31–10.16)

## 2021-02-24 PROCEDURE — 99284 EMERGENCY DEPT VISIT MOD MDM: CPT

## 2021-02-24 PROCEDURE — 83690 ASSAY OF LIPASE: CPT | Performed by: PHYSICIAN ASSISTANT

## 2021-02-24 PROCEDURE — 99284 EMERGENCY DEPT VISIT MOD MDM: CPT | Performed by: PHYSICIAN ASSISTANT

## 2021-02-24 PROCEDURE — 85025 COMPLETE CBC W/AUTO DIFF WBC: CPT | Performed by: PHYSICIAN ASSISTANT

## 2021-02-24 PROCEDURE — 80053 COMPREHEN METABOLIC PANEL: CPT | Performed by: PHYSICIAN ASSISTANT

## 2021-02-24 PROCEDURE — 36415 COLL VENOUS BLD VENIPUNCTURE: CPT | Performed by: PHYSICIAN ASSISTANT

## 2021-02-24 RX ORDER — ONDANSETRON 4 MG/1
4 TABLET, ORALLY DISINTEGRATING ORAL EVERY 6 HOURS PRN
Qty: 20 TABLET | Refills: 0 | Status: SHIPPED | OUTPATIENT
Start: 2021-02-24 | End: 2021-12-13

## 2021-02-24 NOTE — ED PROVIDER NOTES
History  Chief Complaint   Patient presents with    Abdominal Pain     Pt c/o right sided abdominal pain for 1 week since he drank wine  Since that day whenever he eats he has pain      Patient is a 18-year-old male with past medical history of GERD, HTN, asthma, IBS who presents with left upper quadrant, epigastric pain for 1 week  Patient states the pain started after he had some Rinne last week  He states he has had this 1 before, and is a occasionally drinks alcohol, and has never had any previous similar symptoms  Patient describes an aching to sharp pain that occurs intermittently over the last week, primarily with eating, but sometimes when not eating  He states the pain sometimes improves with water  He denies any other alleviating or aggravating factors  He notes intermittent nausea, but states that has been consistent with his GERD  He has been taking his medications as prescribed and just had an EGD and colonoscopy last week  Chart review reveals mild chronic inflammation of the stomach, with no other acute findings  At that time patient was instructed take famotidine b i d  For the next 2 months  Patient denies any associated vomiting, change in his chronic nonbloody diarrhea consistent with his IBS symptoms, dysuria, hematuria, urinary frequency or urgency, change in his chronic groin pain, penile pain or discharge, history of abdominal surgeries  Patient states he is otherwise in his usual state of health and denies any fevers, chills, diaphoresis, headaches, vision changes, congestion, cough, shortness of breath, chest pain, palpitations, recent travel, known sick contacts, new foods or medications  Prior to Admission Medications   Prescriptions Last Dose Informant Patient Reported? Taking?    EPINEPHrine (EPIPEN 2-CUBA) 0 3 mg/0 3 mL SOAJ  Self Yes No   Sig: EpiPen 2-[place] 0 3 MG/0 3ML Injection Solution Auto-injector  Use as directed, prn allergic reaction   Quantity: 1;  Refills: 1      Daja Ignacio DO [place] ;  Start   Active  2 Solution Auto-injector Pen   Na Sulfate-K Sulfate-Mg Sulf (Suprep Bowel Prep Kit) 17 5-3 13-1 6 GM/177ML SOLN   No No   Sig: Take 2 Bottles by mouth see administration instructions Please follow the instructions from the office   albuterol (ACCUNEB) 1 25 MG/3ML nebulizer solution  Self Yes No   Sig: Inhale 1 ampule every 6 (six) hours as needed   albuterol (Ventolin HFA) 90 mcg/act inhaler   No No   Sig: Inhale 2 puffs every 6 (six) hours as needed for wheezing   fluticasone (FLONASE) 50 mcg/act nasal spray  Self No No   Si sprays into each nostril daily   magnesium citrate (CITROMA) 1 745 g/30 mL oral solution   No No   Sig: Take 296 mL by mouth once for 1 dose   polyethylene glycol (GOLYTELY) 4000 mL solution   No No   Sig: Take 4,000 mL by mouth once for 1 dose   predniSONE 10 mg tablet   No No   Si tablets daily, all at one time, with food  Then on day #4 decrease by 1 pill each day until finished  valsartan (DIOVAN) 80 mg tablet   No No   Sig: Take 1 tablet (80 mg total) by mouth daily      Facility-Administered Medications: None       Past Medical History:   Diagnosis Date    Asthma     Asthma due to seasonal allergies     GERD (gastroesophageal reflux disease)     Hypertension        Past Surgical History:   Procedure Laterality Date    NO PAST SURGERIES      WISDOM TOOTH EXTRACTION         History reviewed  No pertinent family history  I have reviewed and agree with the history as documented      E-Cigarette/Vaping    E-Cigarette Use Current Every Day User     Start Date 14     Cartridges/Day 1 cartridge every 2 weeks      E-Cigarette/Vaping Substances    Nicotine Yes     Flavoring Yes      Social History     Tobacco Use    Smoking status: Former Smoker     Types: Cigarettes     Quit date: 2019     Years since quittin 2    Smokeless tobacco: Never Used   Substance Use Topics    Alcohol use: Yes     Binge frequency: Less than monthly     Comment: Rare    Drug use: No       Review of Systems   Constitutional: Negative for chills, diaphoresis and fever  HENT: Negative for congestion and sore throat  Respiratory: Negative for cough, shortness of breath, wheezing and stridor  Cardiovascular: Negative for chest pain and palpitations  Gastrointestinal: Positive for abdominal pain, diarrhea (Chronic unchanged) and nausea  Negative for blood in stool and vomiting  Genitourinary: Negative for difficulty urinating, dysuria, flank pain, frequency, hematuria, penile pain, penile swelling and urgency  Musculoskeletal: Negative for myalgias, neck pain and neck stiffness  Skin: Negative for color change, pallor and rash  Neurological: Negative for dizziness, weakness, light-headedness, numbness and headaches  All other systems reviewed and are negative  Physical Exam  Physical Exam  Vitals signs and nursing note reviewed  Constitutional:       General: He is awake  He is not in acute distress  Appearance: He is well-developed  He is not ill-appearing, toxic-appearing or diaphoretic  HENT:      Head: Normocephalic and atraumatic  Right Ear: External ear normal       Left Ear: External ear normal       Nose: Nose normal    Eyes:      Conjunctiva/sclera: Conjunctivae normal       Pupils: Pupils are equal, round, and reactive to light  Neck:      Musculoskeletal: Normal range of motion and neck supple  Cardiovascular:      Rate and Rhythm: Normal rate and regular rhythm  Heart sounds: Normal heart sounds, S1 normal and S2 normal    Pulmonary:      Effort: Pulmonary effort is normal  No respiratory distress  Breath sounds: Normal breath sounds  No stridor  No decreased breath sounds or wheezing  Abdominal:      General: Bowel sounds are normal  There is no distension  Palpations: Abdomen is soft  Tenderness: There is no abdominal tenderness     Musculoskeletal: Normal range of motion  Comments: Moving all extremities freely, ambulating without issue   Skin:     General: Skin is warm and dry  Capillary Refill: Capillary refill takes less than 2 seconds  Neurological:      Mental Status: He is alert and oriented to person, place, and time  GCS: GCS eye subscore is 4  GCS verbal subscore is 5  GCS motor subscore is 6  Psychiatric:         Behavior: Behavior is cooperative           Vital Signs  ED Triage Vitals   Temperature Pulse Respirations Blood Pressure SpO2   02/24/21 1249 02/24/21 1249 02/24/21 1249 02/24/21 1249 02/24/21 1249   99 3 °F (37 4 °C) 90 16 141/72 96 %      Temp Source Heart Rate Source Patient Position - Orthostatic VS BP Location FiO2 (%)   02/24/21 1249 02/24/21 1249 02/24/21 1431 02/24/21 1431 --   Oral Monitor Lying Right arm       Pain Score       02/24/21 1249       No Pain           Vitals:    02/24/21 1249 02/24/21 1431   BP: 141/72 134/84   Pulse: 90 91   Patient Position - Orthostatic VS:  Lying         Visual Acuity      ED Medications  Medications - No data to display    Diagnostic Studies  Results Reviewed     Procedure Component Value Units Date/Time    Lipase [[id number]]  (Normal) Collected: 02/24/21 1528    Lab Status: Final result Specimen: Blood from Arm, Right Updated: 02/24/21 1556     Lipase 105 u/L     Comprehensive metabolic panel [[id number]] Collected: 02/24/21 1528    Lab Status: Final result Specimen: Blood from Arm, Right Updated: 02/24/21 1556     Sodium 139 mmol/L      Potassium 3 5 mmol/L      Chloride 105 mmol/L      CO2 26 mmol/L      ANION GAP 8 mmol/L      BUN 12 mg/dL      Creatinine 0 85 mg/dL      Glucose 110 mg/dL      Calcium 8 9 mg/dL      AST 18 U/L      ALT 31 U/L      Alkaline Phosphatase 65 U/L      Total Protein 7 3 g/dL      Albumin 4 0 g/dL      Total Bilirubin 0 45 mg/dL      eGFR 124 ml/min/1 73sq m     Narrative:      [place] guidelines for Chronic Kidney Disease (CKD):    Stage 1 with normal or high GFR (GFR > 90 mL/min/1 73 square meters)    Stage 2 Mild CKD (GFR = 60-89 mL/min/1 73 square meters)    Stage 3A Moderate CKD (GFR = 45-59 mL/min/1 73 square meters)    Stage 3B Moderate CKD (GFR = 30-44 mL/min/1 73 square meters)    Stage 4 Severe CKD (GFR = 15-29 mL/min/1 73 square meters)    Stage 5 End Stage CKD (GFR <15 mL/min/1 73 square meters)  Note: GFR calculation is accurate only with a steady state creatinine    CBC and differential [[id number]] Collected: 02/24/21 1528    Lab Status: Final result Specimen: Blood from Arm, Right Updated: 02/24/21 1534     WBC 7 20 Thousand/uL      RBC 4 88 Million/uL      Hemoglobin 14 7 g/dL      Hematocrit 44 2 %      MCV 91 fL      MCH 30 1 pg      MCHC 33 3 g/dL      RDW 11 9 %      MPV 9 0 fL      Platelets 474 Thousands/uL      nRBC 0 /100 WBCs      Neutrophils Relative 56 %      Immat GRANS % 0 %      Lymphocytes Relative 32 %      Monocytes Relative 9 %      Eosinophils Relative 2 %      Basophils Relative 1 %      Neutrophils Absolute 4 10 Thousands/µL      Immature Grans Absolute 0 03 Thousand/uL      Lymphocytes Absolute 2 29 Thousands/µL      Monocytes Absolute 0 61 Thousand/µL      Eosinophils Absolute 0 13 Thousand/µL      Basophils Absolute 0 04 Thousands/µL                  No orders to display              Procedures  Procedures         ED Course                                           MDM  Number of Diagnoses or Management Options  Intermittent abdominal pain:   Diagnosis management comments: Reviewed all results with patient, answered questions  Reviewed medication education, treatment at home, BRAT diet, encouraged hydration  Recommended follow-up with GI for further evaluation of symptoms  Instructed to continue all previously prescribed medications  Recommended follow-up with PCP as needed  The management plan was discussed in detail with the patient at bedside and all questions were answered   Provided both verbal and written instructions  Reviewed red flag symptoms and strict return to ED instructions  Patient notes understanding and agrees to plan  Disposition  Final diagnoses:   Intermittent abdominal pain   Nausea     Time reflects when diagnosis was documented in both MDM as applicable and the Disposition within this note     Time User Action Codes Description Comment    2/24/2021  4:05 PM Precious Cowan [address] [R10 9] Intermittent abdominal pain     2/24/2021  4:22 PM Armando Suarezer Add [R11 0] Nausea       ED Disposition     ED Disposition Condition Date/Time Comment    Discharge Stable Wed Feb 24, 2021  4:05 PM Henriquespencer Baldwinuche discharge to home/self care  Follow-up Information     Follow up With Specialties Details Why Contact Info Additional Information    Juan Jose Simms, DO Family Medicine  As needed [phone]       [id number]  Emergency Department Emergency Medicine  If symptoms worsen [facility] [id number]  [address] [facility] Emergency Department, [address], [place], [address]    Follow-up with your GI specialist for further evaluation of symptoms         [facility]   [address] [facility]  [address] [facility] [id number]  Rodney Francisco Thomason [address] [facility], [address] [facility], [address], [place], [place], [id number] [phone]          Patient's Medications   Discharge Prescriptions    ONDANSETRON (ZOFRAN-ODT) 4 MG DISINTEGRATING TABLET    Take 1 tablet (4 mg total) by mouth every 6 (six) hours as needed for nausea or vomiting       Start Date: 2/24/2021 End Date: --       Order Dose: 4 mg       Quantity: 20 tablet    Refills: 0     No discharge procedures on file      PDMP Review     None          ED Provider  Electronically Signed by           Wilian Murillo PA-C  02/24/21 9083

## 2021-02-24 NOTE — DISCHARGE INSTRUCTIONS
Continue all previously prescribed medications  Follow-up with GI for further evaluation of symptoms  Follow-up with PCP for monitoring of symptoms  Return to ED if symptoms worsen including increasing pain, inability to tolerate food or fluid, blood in vomit or stool, fevers

## 2021-03-02 ENCOUNTER — LAB (OUTPATIENT)
Dept: LAB | Facility: MEDICAL CENTER | Age: 21
End: 2021-03-02
Payer: COMMERCIAL

## 2021-03-02 DIAGNOSIS — R19.7 DIARRHEA, UNSPECIFIED TYPE: ICD-10-CM

## 2021-03-02 PROCEDURE — 82784 ASSAY IGA/IGD/IGG/IGM EACH: CPT

## 2021-03-02 PROCEDURE — 36415 COLL VENOUS BLD VENIPUNCTURE: CPT

## 2021-03-02 PROCEDURE — 83516 IMMUNOASSAY NONANTIBODY: CPT

## 2021-03-02 PROCEDURE — 86008 ALLG SPEC IGE RECOMB EA: CPT

## 2021-03-02 PROCEDURE — 86255 FLUORESCENT ANTIBODY SCREEN: CPT

## 2021-03-02 PROCEDURE — 86003 ALLG SPEC IGE CRUDE XTRC EA: CPT

## 2021-03-02 PROCEDURE — 82785 ASSAY OF IGE: CPT

## 2021-03-03 LAB
A-LACTALB IGE QN: <0.1 KAU/I
ALLERGEN COMMENT: ABNORMAL
ALMOND IGE QN: 4.5 KUA/I
ARA H6 PEANUT: 1.35 KUA/I
B-LACTOGLOB IGE QN: <0.1 KAU/I
CASEIN IGE QN: <0.1 KAU/I
CASHEW NUT IGE QN: 0.33 KUA/I
CODFISH IGE QN: <0.1 KUA/I
EGG WHITE IGE QN: 0.74 KUA/I
ENDOMYSIUM IGA SER QL: NEGATIVE
GLIADIN PEPTIDE IGA SER-ACNC: 5 UNITS (ref 0–19)
GLIADIN PEPTIDE IGG SER-ACNC: 3 UNITS (ref 0–19)
GLUTEN IGE QN: 0.6 KUA/I
HAZELNUT IGE QN: 22.5 KUA/L
IGA SERPL-MCNC: 315 MG/DL (ref 90–386)
MILK IGE QN: 0.11 KUA/I
OVALB IGE QN: 0.33 KAU/I
OVOMUCOID IGE QN: <0.1 KAU/I
PEANUT (RARA H) 1 IGE QN: <0.1 KUA/I
PEANUT (RARA H) 2 IGE QN: 0.77 KUA/I
PEANUT (RARA H) 3 IGE QN: <0.1 KUA/I
PEANUT (RARA H) 8 IGE QN: 9.15 KUA/I
PEANUT (RARA H) 9 IGE QN: 0.11 KUA/I
PEANUT IGE QN: 9.17 KUA/I
SALMON IGE QN: <0.1 KUA/I
SCALLOP IGE QN: 0.16 KUA/L
SESAME SEED IGE QN: 6.5 KUA/I
SHRIMP IGE QN: 6.42 KUA/L
SOYBEAN IGE QN: 2.05 KUA/I
TOTAL IGE SMQN RAST: 650 KU/L (ref 0–113)
TTG IGA SER-ACNC: <2 U/ML (ref 0–3)
TTG IGG SER-ACNC: 6 U/ML (ref 0–5)
TUNA IGE QN: 0.14 KUA/I
WALNUT IGE QN: 2.61 KUA/I
WHEAT IGE QN: 2.63 KUA/I

## 2021-03-04 DIAGNOSIS — Z91.018 MULTIPLE FOOD ALLERGIES: Primary | ICD-10-CM

## 2021-03-05 ENCOUNTER — TELEPHONE (OUTPATIENT)
Dept: FAMILY MEDICINE CLINIC | Facility: CLINIC | Age: 21
End: 2021-03-05

## 2021-03-05 ENCOUNTER — TELEMEDICINE (OUTPATIENT)
Dept: FAMILY MEDICINE CLINIC | Facility: CLINIC | Age: 21
End: 2021-03-05
Payer: COMMERCIAL

## 2021-03-05 DIAGNOSIS — J45.909 MILD ASTHMA WITHOUT COMPLICATION, UNSPECIFIED WHETHER PERSISTENT: Primary | ICD-10-CM

## 2021-03-05 PROCEDURE — 36415 COLL VENOUS BLD VENIPUNCTURE: CPT | Performed by: FAMILY MEDICINE

## 2021-03-05 PROCEDURE — 1036F TOBACCO NON-USER: CPT | Performed by: FAMILY MEDICINE

## 2021-03-05 PROCEDURE — 99214 OFFICE O/P EST MOD 30 MIN: CPT | Performed by: FAMILY MEDICINE

## 2021-03-05 RX ORDER — MONTELUKAST SODIUM 10 MG/1
10 TABLET ORAL
Qty: 30 TABLET | Refills: 5 | Status: SHIPPED | OUTPATIENT
Start: 2021-03-05

## 2021-03-05 RX ORDER — FLUTICASONE FUROATE AND VILANTEROL 100; 25 UG/1; UG/1
1 POWDER RESPIRATORY (INHALATION) DAILY
Qty: 1 EACH | Refills: 1 | Status: SHIPPED | OUTPATIENT
Start: 2021-03-05 | End: 2021-04-06 | Stop reason: ALTCHOICE

## 2021-03-05 NOTE — ASSESSMENT & PLAN NOTE
Patient is likely having an exacerbation of asthma and does have an  Albuterol inhaler at home  He uses this sparingly  Recommend starting Singulair daily as well as Breo inhaler daily  He will call if symptoms persist   Chest x-ray if symptoms do persist   Consider pulmonology evaluation if needed  I also asked him to call if Breo inhaler is not helpful

## 2021-03-05 NOTE — PROGRESS NOTES
Virtual Regular Visit      Assessment/Plan:  Time spent counseling 15 of the 25 minutes visit  Recommend follow-up with pulmonology as noted  He will call with any new persisting or worsening symptoms  Problem List Items Addressed This Visit        Respiratory    Asthma - Primary       Patient is likely having an exacerbation of asthma and does have an  Albuterol inhaler at home  He uses this sparingly  Recommend starting Singulair daily as well as Breo inhaler daily  He will call if symptoms persist   Chest x-ray if symptoms do persist   Consider pulmonology evaluation if needed  I also asked him to call if Breo inhaler is not helpful  Relevant Medications    fluticasone-vilanterol (BREO ELLIPTA) 100-25 mcg/inh inhaler    montelukast (SINGULAIR) 10 mg tablet    Other Relevant Orders    XR chest pa & lateral               Reason for visit is   Chief Complaint   Patient presents with    Virtual Regular Visit        Encounter provider Francisco Zhang DO    Provider located at [address] [place] [address] [phone]      Recent Visits  No visits were found meeting these conditions  Showing recent visits within past 7 days and meeting all other requirements     Today's Visits  Date Type Provider Dept   03/05/21 Telemedicine Francisco Zhang DO [facility]   Showing today's visits and meeting all other requirements     Future Appointments  No visits were found meeting these conditions  Showing future appointments within next 150 days and meeting all other requirements        The patient was identified by name and date of birth  Marifer Cosme was informed that this is a telemedicine visit and that the visit is being conducted through [address] [place] [address] and patient was informed that this is not a secure, HIPAA-compliant platform  He agrees to proceed     My office door was closed  No one else was in the room    He acknowledged consent and understanding of privacy and security of the video platform  The patient has agreed to participate and understands they can discontinue the visit at any time  Patient is aware this is a billable service  Amee العلي is a 23 y o  male   For cough   Patient with asthma has had chronic cough but no shortness of breath  No fevers  Cough is dry  Patient denies any symptoms at night         Past Medical History:   Diagnosis Date    Asthma     Asthma due to seasonal allergies     GERD (gastroesophageal reflux disease)     Hypertension        Past Surgical History:   Procedure Laterality Date    NO PAST SURGERIES      WISDOM TOOTH EXTRACTION         Current Outpatient Medications   Medication Sig Dispense Refill    albuterol (ACCUNEB) 1 25 MG/3ML nebulizer solution Inhale 1 ampule every 6 (six) hours as needed      albuterol (Ventolin HFA) 90 mcg/act inhaler Inhale 2 puffs every 6 (six) hours as needed for wheezing 2 Inhaler 0    EPINEPHrine (EPIPEN 2-JAYME) 0 3 mg/0 3 mL SOAJ EpiPen 2-Jayme 0 3 MG/0 3ML Injection Solution Auto-injector  Use as directed, prn allergic reaction   Quantity: 1;  Refills: 1      Quincy Ignacio DO ;  Start 31-July-2014  Active  2 Solution Auto-injector Pen      fluticasone (FLONASE) 50 mcg/act nasal spray 2 sprays into each nostril daily 16 g 0    fluticasone-vilanterol (BREO ELLIPTA) 100-25 mcg/inh inhaler Inhale 1 puff daily 1 each 1    magnesium citrate (CITROMA) 1 745 g/30 mL oral solution Take 296 mL by mouth once for 1 dose 296 mL 0    montelukast (SINGULAIR) 10 mg tablet Take 1 tablet (10 mg total) by mouth daily at bedtime 30 tablet 5    Na Sulfate-K Sulfate-Mg Sulf (Suprep Bowel Prep Kit) 17 5-3 13-1 6 GM/177ML SOLN Take 2 Bottles by mouth see administration instructions Please follow the instructions from the office 2 Bottle 0    ondansetron (ZOFRAN-ODT) 4 mg disintegrating tablet Take 1 tablet (4 mg total) by mouth every 6 (six) hours as needed for nausea or vomiting 20 tablet 0    polyethylene glycol (GOLYTELY) 4000 mL solution Take 4,000 mL by mouth once for 1 dose 4000 mL 0    valsartan (DIOVAN) 80 mg tablet Take 1 tablet (80 mg total) by mouth daily 90 tablet 1     No current facility-administered medications for this visit  Allergies   Allergen Reactions    Nuts Anaphylaxis    Shellfish-Derived Products Anaphylaxis    Other      Tree nuts  Yogurt  Carrots  Lymes    Peanut Oil     Penicillins      Family allergy        Review of Systems   Constitutional: Negative  Negative for fever  HENT: Negative  Eyes: Negative  Respiratory: Positive for cough  Cardiovascular: Negative  Gastrointestinal: Negative  Endocrine: Negative  Genitourinary: Negative  Musculoskeletal: Negative  Skin: Negative  Allergic/Immunologic: Negative  Neurological: Negative  Hematological: Negative  Psychiatric/Behavioral: Negative  Video Exam    There were no vitals filed for this visit  Physical Exam  Constitutional:       General: He is not in acute distress  Appearance: He is well-developed  He is not diaphoretic  Neurological:      Mental Status: He is alert and oriented to person, place, and time  Psychiatric:         Behavior: Behavior normal          Thought Content: Thought content normal          Judgment: Judgment normal           I spent 25 minutes with patient today in which greater than 50% of the time was spent in counseling/coordination of care regarding For cough      VIRTUAL VISIT [address] [employer] acknowledges that he has consented to an online visit or consultation  He understands that the online visit is based solely on information provided by him, and that, in the absence of a face-to-face physical evaluation by the physician, the diagnosis he receives is both limited and provisional in terms of accuracy and completeness   This is not intended to replace a full medical face-to-face evaluation by the physician  Chetan Campos understands and accepts these terms

## 2021-03-08 NOTE — TELEPHONE ENCOUNTER
Patient should contact his insurance to see what medication in this class is covered  One of the once that is typically covered is Flovent  He may also wish to check and see if Symbicort is covered

## 2021-04-06 ENCOUNTER — OFFICE VISIT (OUTPATIENT)
Dept: FAMILY MEDICINE CLINIC | Facility: CLINIC | Age: 21
End: 2021-04-06
Payer: COMMERCIAL

## 2021-04-06 VITALS
OXYGEN SATURATION: 98 % | HEART RATE: 85 BPM | DIASTOLIC BLOOD PRESSURE: 82 MMHG | SYSTOLIC BLOOD PRESSURE: 118 MMHG | HEIGHT: 69 IN | WEIGHT: 176 LBS | TEMPERATURE: 98.2 F | BODY MASS INDEX: 26.07 KG/M2

## 2021-04-06 DIAGNOSIS — J45.40 MODERATE PERSISTENT ASTHMA, UNSPECIFIED WHETHER COMPLICATED: ICD-10-CM

## 2021-04-06 DIAGNOSIS — R10.9 ABDOMINAL PAIN, UNSPECIFIED ABDOMINAL LOCATION: Primary | ICD-10-CM

## 2021-04-06 DIAGNOSIS — J40 BRONCHITIS: ICD-10-CM

## 2021-04-06 PROCEDURE — 99214 OFFICE O/P EST MOD 30 MIN: CPT | Performed by: FAMILY MEDICINE

## 2021-04-06 RX ORDER — AZITHROMYCIN 250 MG/1
TABLET, FILM COATED ORAL
Qty: 6 TABLET | Refills: 0 | Status: SHIPPED | OUTPATIENT
Start: 2021-04-06 | End: 2021-04-13

## 2021-04-06 RX ORDER — ALBUTEROL SULFATE 90 UG/1
2 AEROSOL, METERED RESPIRATORY (INHALATION) EVERY 6 HOURS PRN
Qty: 2 INHALER | Refills: 0 | Status: SHIPPED | OUTPATIENT
Start: 2021-04-06

## 2021-04-06 RX ORDER — FLUTICASONE PROPIONATE 110 UG/1
2 AEROSOL, METERED RESPIRATORY (INHALATION) 2 TIMES DAILY
Qty: 1 INHALER | Refills: 0 | Status: SHIPPED | OUTPATIENT
Start: 2021-04-06

## 2021-04-07 NOTE — PROGRESS NOTES
Assessment/Plan: patient may have some bronchitis related to his asthma  Recommend starting Flovent  He continues on Singulair  Consider chest x-ray if needed  Recommend follow-up with gastroenterologist if no improvement in symptoms  1  Abdominal pain, unspecified abdominal location  -     US abdomen complete; Future; Expected date: 04/06/2021    2  Bronchitis  -     albuterol (Ventolin HFA) 90 mcg/act inhaler; Inhale 2 puffs every 6 (six) hours as needed for wheezing  -     azithromycin (ZITHROMAX) 250 mg tablet; Take 2 tablets today then 1 tablet daily x 4 days    3  Moderate persistent asthma, unspecified whether complicated  -     albuterol (Ventolin HFA) 90 mcg/act inhaler; Inhale 2 puffs every 6 (six) hours as needed for wheezing  -     fluticasone (FLOVENT HFA) 110 MCG/ACT inhaler; Inhale 2 puffs 2 (two) times a day Rinse mouth after use  Subjective:      Patient ID: Cynthia Rushing is a 23 y o  male  Patient with history of asthma is here today with very nonspecific symptoms but he does note increased cough  He also has occasional headaches  He also has nonspecific abdominal pain  He did have a CT scan in December that was negative  He states when he drinks alcohol he gets cramping to the lower abdomen  Denies any fevers  The following portions of the patient's history were reviewed and updated as appropriate: allergies, current medications, past family history, past medical history, past social history, past surgical history, and problem list     Review of Systems   Constitutional: Negative  HENT: Negative  Eyes: Negative  Respiratory: Positive for cough  Cardiovascular: Negative  Gastrointestinal: Negative  Endocrine: Negative  Genitourinary: Negative  Musculoskeletal: Negative  Skin: Negative  Allergic/Immunologic: Negative  Neurological: Positive for headaches  Hematological: Negative  Psychiatric/Behavioral: Negative  Objective:      /82 (BP Location: Left arm, Patient Position: Sitting, Cuff Size: Large)   Pulse 85   Temp 98 2 °F (36 8 °C) (Temporal)   Ht 5' 9" (1 753 m)   Wt 79 8 kg (176 lb)   SpO2 98%   BMI 25 99 kg/m²          Physical Exam  Vitals signs reviewed  Constitutional:       Appearance: He is well-developed  HENT:      Head: Normocephalic and atraumatic  Right Ear: External ear normal  Tympanic membrane is not erythematous or bulging  Left Ear: External ear normal  Tympanic membrane is not erythematous or bulging  Nose: Nose normal       Mouth/Throat:      Mouth: No oral lesions  Pharynx: No oropharyngeal exudate  Eyes:      General: No scleral icterus  Right eye: No discharge  Left eye: No discharge  Conjunctiva/sclera: Conjunctivae normal    Neck:      Musculoskeletal: Normal range of motion and neck supple  Thyroid: No thyromegaly  Cardiovascular:      Rate and Rhythm: Normal rate and regular rhythm  Heart sounds: Normal heart sounds  No murmur  No friction rub  No gallop  Pulmonary:      Effort: Pulmonary effort is normal  No respiratory distress  Breath sounds: No wheezing or rales  Chest:      Chest wall: No tenderness  Abdominal:      General: Bowel sounds are normal  There is no distension  Palpations: Abdomen is soft  There is no mass  Tenderness: There is no abdominal tenderness  There is no guarding or rebound  Musculoskeletal: Normal range of motion  General: No tenderness or deformity  Lymphadenopathy:      Cervical: No cervical adenopathy  Skin:     General: Skin is warm and dry  Coloration: Skin is not pale  Findings: No erythema or rash  Neurological:      Mental Status: He is alert and oriented to person, place, and time  Cranial Nerves: No cranial nerve deficit  Motor: No abnormal muscle tone        Coordination: Coordination normal       Deep Tendon Reflexes: Reflexes are normal and symmetric     Psychiatric:         Behavior: Behavior normal

## 2021-04-12 DIAGNOSIS — Z23 ENCOUNTER FOR IMMUNIZATION: ICD-10-CM

## 2021-04-15 ENCOUNTER — HOSPITAL ENCOUNTER (OUTPATIENT)
Dept: ULTRASOUND IMAGING | Facility: MEDICAL CENTER | Age: 21
Discharge: HOME | End: 2021-04-15
Payer: COMMERCIAL

## 2021-04-15 DIAGNOSIS — R10.9 ABDOMINAL PAIN, UNSPECIFIED ABDOMINAL LOCATION: ICD-10-CM

## 2021-04-15 PROCEDURE — 76700 US EXAM ABDOM COMPLETE: CPT

## 2021-04-22 PROBLEM — Z91.018 FOOD ALLERGY: Status: ACTIVE | Noted: 2021-04-22

## 2021-04-22 NOTE — PROGRESS NOTES
[facility] - Outpatient Follow-up Note  Faina Dennis 23 y o  male MRN: [id number]  Encounter: [id number]          ASSESSMENT AND PLAN:      Anshu Dillon is a 23year old male with history of asthma, migraines, multiple food allergies who presents for follow up  1  Positive food allergy panel  2  Diarrhea  3  Lower abdominal pain  Diarrhea has resolved  Patient has not seen nutritionist yet for multiple food allergies on panel  He tried a gluten free diet for a little while and saw some improvement but has stopped this and does not feel any different  Diarrhea now resolved  -Encourage to set up appointment with nutritionist   -Refill Bentyl 10 mg for patient to use as needed for abdominal pain  He was advised to take this for the first time at night to see if he has any symptoms of dizziness  He was educated on other side effects such as dry mouth, dry eyes, dizziness    -Continue to avoid foods in his diet that cause him worsening symptoms such as alcohol, milk  4  Mild hepatic steatosis on US  Patient reports only rare alcohol use, BMI 25 99  No apparent medications to cause steatosis  Patient has HTN, elevated lipids with total cholesterol 222,   LFTs, platelets normal     -Repeat LFTs before next visit to ensure stable  -Due to no apparent risk factors for fatty liver (ie  Obesity, alcohol use, medication), will obtain elastrography to assess for any fibrosis in this case  -Patient educated on healthy diet and increase exercise   -Patient should follow up with PCP regarding lipid panel  5  Dyspepsia  Patient reports rare heartburn but occasional epigastric pain with foods such as wine  He has not tried to trial off of Pepcid  -Refilled Pepcid for one more month  During this time the patient was advised to stop taking it to see if there are any worsening of his symptoms   If not, he can discontinue this medication   -Advised to avoid foods that can worsen reflux including alcohol, spicy foods, fatty foods, citrus, tomato based products, caffeine, coffee, tea   -Avoid laying down 3 hours after meal  Patient reports eating while laying down which I advised against      Patient will follow up in 4 months     ______________________________________________________________________    SUBJECTIVE:      Katie Martínez is a 23year old male with PMH of asthma, migraines, HTN, autism who presents for follow up  He was seen by our office last year for diarrhea and dyspepsia  Endoscopy done 2/2021 with negative biopsies for celiac, h pylori  Colonoscopy in 2/2021 was also unremarkable  CBC and CMP normal  Food allergy profile ordered which was positive multiple food allergies  Nutritionist referral placed  Patient also had celiac serology ordered which showed a weak positive value for IgG Ab, normal IgA antibody in the setting of normal IgA levels  US on abdomen ordered by PCP notable for mild hepatic steatosis, probable hepatic hemangioma, 3 mm gallbladder polyp  Patient went to the ER 2/24/2021 for LUQ abdominal pain  All labs were normal  No imaging done  The patient reports being unable to remember information about his life if it is more than a few weeks prior, which he attributes to multiple concussions  His wife Li Peters is in the room and provides most of the answers to his history  Patient reports constant bilateral lower abdominal pain that is worse with alcohol and lifting  He reports it is burning, stabbing and cramping  Nothing makes it better  He does not remember if he tried bentyl in the past, the wife states he has  He received zofran in the ER ans says he thinks he took it but it did not help the abdominal pain  He denies any nausea  His diarrhea has completely resolved  He is having 1-2 formed BMs daily without blood, mucous, or urgency  He trialed a gluten free diet and says he felt better but has not continued the diet  He avoids dairy as they cause worsening symptoms  He is able to tolerate butter and still eats ice cream  Patient denies any heartburn but reports some epigastric pain with certain things like wine  He is still taking Pepcid 20 mg daily  He denies any vomiting, dysphagia, odynophagia, weight loss, NSAID use  Denies any rashes, joint pains, jaundice  He quit smoking 4 months ago  Reports rare alcohol use, marijuana use  Denies tattoos, IVDA, history of hepatitis  No significant family history of GI malignancies  No abdominal surgeries  Endoscopy done 2021 with negative biopsies for celiac, h pylori  Colonoscopy in 2021 was also unremarkable  REVIEW OF SYSTEMS IS OTHERWISE NEGATIVE  Historical Information   Past Medical History:   Diagnosis Date    Asthma     Asthma due to seasonal allergies     GERD (gastroesophageal reflux disease)     Hypertension      Past Surgical History:   Procedure Laterality Date    COLONOSCOPY      NO PAST SURGERIES      UPPER GASTROINTESTINAL ENDOSCOPY      WISDOM TOOTH EXTRACTION       Social History   Social History     Substance and Sexual Activity   Alcohol Use Yes    Binge frequency: Less than monthly    Comment: Rare     Social History     Substance and Sexual Activity   Drug Use No     Social History     Tobacco Use   Smoking Status Former Smoker    Types: Cigarettes    Quit date: 2019    Years since quittin 3   Smokeless Tobacco Never Used     History reviewed  No pertinent family history      Meds/Allergies       Current Outpatient Medications:     albuterol (ACCUNEB) 1 25 MG/3ML nebulizer solution    albuterol (Ventolin HFA) 90 mcg/act inhaler    EPINEPHrine (EPIPEN 2-CUBA) 0 3 mg/0 3 mL SOAJ    fluticasone (FLOVENT HFA) 110 MCG/ACT inhaler    montelukast (SINGULAIR) 10 mg tablet    ondansetron (ZOFRAN-ODT) 4 mg disintegrating tablet    valsartan (DIOVAN) 80 mg tablet    dicyclomine (BENTYL) 10 mg capsule    famotidine (PEPCID) 20 mg tablet    fluticasone (FLONASE) 50 mcg/act nasal spray    magnesium citrate (CITROMA) 1 745 g/30 mL oral solution    Na Sulfate-K Sulfate-Mg Sulf (Suprep Bowel Prep Kit) 17 5-3 13-1 6 GM/177ML SOLN    polyethylene glycol (GOLYTELY) 4000 mL solution    Allergies   Allergen Reactions    Nuts - Food Allergy Anaphylaxis    Shellfish-Derived Products - Food Allergy Anaphylaxis    Other      Tree nuts  Yogurt  Carrots  Lymes    Peanut Oil - Food Allergy     Penicillins      Family allergy            Objective     Blood pressure 132/80, height 5' 9" (1 753 m), weight 78 5 kg (173 lb)  Body mass index is 25 55 kg/m²  PHYSICAL EXAM:      General Appearance:   Alert, cooperative, no distress  HEENT:   Normocephalic, atraumatic, anicteric      Neck:  Supple, symmetrical, trachea midline   Lungs:   Clear to auscultation bilaterally; no rales, rhonchi or wheezing; respirations unlabored    Heart[de-identified]   Regular rate and rhythm; no murmur, rub, or gallop  Abdomen:   +Reports discomfort with palpation throughout  Soft, non-distended; normal bowel sounds; no masses, no organomegaly  Genitalia:   Deferred    Rectal:   Deferred    Extremities:  No cyanosis, clubbing or edema    Pulses:  2+ and symmetric    Skin:  No jaundice, rashes, or lesions    Lymph nodes:  No palpable cervical lymphadenopathy        Lab Results:   No visits with results within 1 Day(s) from this visit     Latest known visit with results is:   Lab on 03/02/2021   Component Date Value    IgA 03/02/2021 315     Gliadin IgA 03/02/2021 5     Gliadin IgG 03/02/2021 3     Tissue Transglut Ab IGG 03/02/2021 6*    TISSUE TRANSGLUTAMINASE * 03/02/2021 <2     Endomysial IgA 03/02/2021 Negative     Fish Cod 03/02/2021 <0 10     Egg White 03/02/2021 0 74*    Gluten 03/02/2021 0 60*    Milk, Cow's 03/02/2021 0 11*    Peanut 03/02/2021 9 17*    [place] 03/02/2021 <0 10     Scallop IgE 03/02/2021 0 16*    Sesame Seed IgE 03/02/2021 6 50*    Shrimp 03/02/2021 6 42*    SOYBEAN 03/02/2021 2 05*    Tuna IgE 03/02/2021 0 14*    [place] 03/02/2021 2 61*    Wheat 03/02/2021 2 63*    Almonds 03/02/2021 4 50*    Cashew 03/02/2021 0 33*    Hazelnut 03/02/2021 22 50*    IgE 03/02/2021 650*    Allergen Comment 03/02/2021 See Below     Alpha Lactalbumin 03/02/2021 <0 10     Beta Lactoglobulin 03/02/2021 <0 10     Casein 03/02/2021 <0 10     BENJA h1 Peanut 03/02/2021 <0 10     BENJA h2 Peanut 03/02/2021 0 77*    BENJA h3 Peanut 03/02/2021 <0 10     BENJA h6 Peanut 03/02/2021 1 35*    BENJA h8 Peanut 03/02/2021 9 15*    BENJA h9 Peanut 03/02/2021 0 11*    F232 Ovalbumin 03/02/2021 0 33*    F233 Ovomucoid 03/02/2021 <0 10          Radiology Results:   Us Abdomen Complete    Result Date: 4/21/2021  Narrative: ABDOMEN ULTRASOUND, COMPLETE INDICATION:   R10 9: Unspecified abdominal pain  COMPARISON: None TECHNIQUE:   Real-time ultrasound of the abdomen was performed with a curvilinear transducer with both volumetric sweeps and still imaging techniques  FINDINGS: PANCREAS:  Visualized portions of the pancreas are within normal limits  AORTA AND IVC:  Visualized portions are normal for patient age  LIVER: Size:  Within upper normal range  The liver measures 16 0 cm in the midclavicular line  Contour:  Surface contour is smooth  Parenchyma:  Mild increased echogenicity relative to right renal cortex No evidence of suspicious mass  Right lobe small 0 5 cm avascular echogenic focus  No given history of malignancy; probable hemangioma  Limited imaging of the main portal vein shows it to be patent and hepatopetal  BILIARY: Anterior gallbladder wall 3 mm nonshadowing nonmobile echogenic focus No intrahepatic biliary dilatation  CBD measures 3 mm  No choledocholithiasis  Negative Boyd's sign KIDNEY: Right kidney measures 10 5 x 5 4 cm  Within normal limits  Left kidney measures 10 5 x 5 2 cm  Within normal limits  SPLEEN: Measures 10 8 x 11 3 x 4 8 cm  Within normal limits  ASCITES:  None       Impression: Mild hepatic steatosis Small hepatic probable hemangioma  3 mm gallbladder polyp  According to current literature guidelines (JACR 2013;10:953-956) polyps this small are benign and no workup nor follow-up necessitated   Workstation performed: [id number]

## 2021-04-23 ENCOUNTER — OFFICE VISIT (OUTPATIENT)
Dept: GASTROENTEROLOGY | Facility: AMBULARY SURGERY CENTER | Age: 21
End: 2021-04-23
Payer: COMMERCIAL

## 2021-04-23 VITALS
DIASTOLIC BLOOD PRESSURE: 80 MMHG | HEIGHT: 69 IN | WEIGHT: 173 LBS | SYSTOLIC BLOOD PRESSURE: 132 MMHG | BODY MASS INDEX: 25.62 KG/M2

## 2021-04-23 DIAGNOSIS — R10.13 DYSPEPSIA: ICD-10-CM

## 2021-04-23 DIAGNOSIS — R10.9 ABDOMINAL PAIN, UNSPECIFIED ABDOMINAL LOCATION: ICD-10-CM

## 2021-04-23 DIAGNOSIS — K76.0 FATTY LIVER: ICD-10-CM

## 2021-04-23 DIAGNOSIS — Z91.018 FOOD ALLERGY: Primary | ICD-10-CM

## 2021-04-23 DIAGNOSIS — R19.7 DIARRHEA, UNSPECIFIED TYPE: ICD-10-CM

## 2021-04-23 PROCEDURE — 99214 OFFICE O/P EST MOD 30 MIN: CPT | Performed by: PHYSICIAN ASSISTANT

## 2021-04-23 PROCEDURE — 1036F TOBACCO NON-USER: CPT | Performed by: PHYSICIAN ASSISTANT

## 2021-04-23 PROCEDURE — 3008F BODY MASS INDEX DOCD: CPT | Performed by: PHYSICIAN ASSISTANT

## 2021-04-23 RX ORDER — DICYCLOMINE HYDROCHLORIDE 10 MG/1
10 CAPSULE ORAL
Qty: 30 CAPSULE | Refills: 1 | Status: SHIPPED | OUTPATIENT
Start: 2021-04-23 | End: 2021-05-12

## 2021-04-23 RX ORDER — FAMOTIDINE 20 MG/1
20 TABLET, FILM COATED ORAL 2 TIMES DAILY
Qty: 30 TABLET | Refills: 0 | Status: SHIPPED | OUTPATIENT
Start: 2021-04-23

## 2021-04-23 NOTE — PATIENT INSTRUCTIONS
Set up appointment for nutritionist     Try one Bentyl as needed for abdominal pain (up to three times a day)    Repeat liver function tests before next appointment with us  Continue Pepcid 20 mg in the morning  Can trial off this month  Schedule US of liver

## 2021-05-06 ENCOUNTER — HOSPITAL ENCOUNTER (OUTPATIENT)
Dept: ULTRASOUND IMAGING | Facility: HOSPITAL | Age: 21
Discharge: HOME | End: 2021-05-06
Payer: COMMERCIAL

## 2021-05-06 DIAGNOSIS — K76.0 FATTY LIVER: ICD-10-CM

## 2021-05-06 PROCEDURE — 76981 USE PARENCHYMA: CPT

## 2021-05-12 DIAGNOSIS — R10.9 ABDOMINAL PAIN, UNSPECIFIED ABDOMINAL LOCATION: ICD-10-CM

## 2021-05-12 RX ORDER — DICYCLOMINE HYDROCHLORIDE 10 MG/1
CAPSULE ORAL
Qty: 30 CAPSULE | Refills: 1 | Status: SHIPPED | OUTPATIENT
Start: 2021-05-12 | End: 2021-12-13

## 2021-06-14 ENCOUNTER — HOSPITAL ENCOUNTER (EMERGENCY)
Facility: HOSPITAL | Age: 21
Discharge: HOME | End: 2021-06-14
Attending: EMERGENCY MEDICINE
Payer: COMMERCIAL

## 2021-06-14 ENCOUNTER — APPOINTMENT (EMERGENCY)
Dept: RADIOLOGY | Facility: HOSPITAL | Age: 21
End: 2021-06-14
Payer: COMMERCIAL

## 2021-06-14 VITALS
TEMPERATURE: 98.5 F | HEART RATE: 85 BPM | DIASTOLIC BLOOD PRESSURE: 88 MMHG | RESPIRATION RATE: 18 BRPM | OXYGEN SATURATION: 100 % | SYSTOLIC BLOOD PRESSURE: 120 MMHG

## 2021-06-14 DIAGNOSIS — F41.9 ANXIETY: Primary | ICD-10-CM

## 2021-06-14 DIAGNOSIS — R07.89 CHEST DISCOMFORT: ICD-10-CM

## 2021-06-14 DIAGNOSIS — R06.02 SOB (SHORTNESS OF BREATH): ICD-10-CM

## 2021-06-14 DIAGNOSIS — F43.9 STRESS AT HOME: ICD-10-CM

## 2021-06-14 LAB
ALBUMIN SERPL BCP-MCNC: 3.9 G/DL (ref 3.5–5)
ALP SERPL-CCNC: 61 U/L (ref 46–116)
ALT SERPL W P-5'-P-CCNC: 46 U/L (ref 12–78)
ANION GAP SERPL CALCULATED.3IONS-SCNC: 4 MMOL/L (ref 4–13)
ANION GAP SERPL CALCULATED.3IONS-SCNC: 6 MMOL/L (ref 4–13)
AST SERPL W P-5'-P-CCNC: 22 U/L (ref 5–45)
BASOPHILS # BLD AUTO: 0.07 THOUSANDS/ΜL (ref 0–0.1)
BASOPHILS NFR BLD AUTO: 1 % (ref 0–1)
BILIRUB SERPL-MCNC: 0.34 MG/DL (ref 0.2–1)
BUN SERPL-MCNC: 11 MG/DL (ref 5–25)
BUN SERPL-MCNC: 12 MG/DL (ref 5–25)
CALCIUM SERPL-MCNC: 8.2 MG/DL (ref 8.3–10.1)
CALCIUM SERPL-MCNC: 8.8 MG/DL (ref 8.3–10.1)
CHLORIDE SERPL-SCNC: 107 MMOL/L (ref 100–108)
CHLORIDE SERPL-SCNC: 93 MMOL/L (ref 100–108)
CO2 SERPL-SCNC: 27 MMOL/L (ref 21–32)
CO2 SERPL-SCNC: 29 MMOL/L (ref 21–32)
CREAT SERPL-MCNC: 1.01 MG/DL (ref 0.6–1.3)
CREAT SERPL-MCNC: 1.02 MG/DL (ref 0.6–1.3)
D DIMER PPP FEU-MCNC: <0.27 UG/ML FEU
EOSINOPHIL # BLD AUTO: 0.28 THOUSAND/ΜL (ref 0–0.61)
EOSINOPHIL NFR BLD AUTO: 3 % (ref 0–6)
ERYTHROCYTE [DISTWIDTH] IN BLOOD BY AUTOMATED COUNT: 12.1 % (ref 11.6–15.1)
GFR SERPL CREATININE-BSD FRML MDRD: 104 ML/MIN/1.73SQ M
GFR SERPL CREATININE-BSD FRML MDRD: 105 ML/MIN/1.73SQ M
GLUCOSE SERPL-MCNC: 100 MG/DL (ref 65–140)
GLUCOSE SERPL-MCNC: 93 MG/DL (ref 65–140)
HCT VFR BLD AUTO: 44.7 % (ref 36.5–49.3)
HGB BLD-MCNC: 14.8 G/DL (ref 12–17)
IMM GRANULOCYTES # BLD AUTO: 0.04 THOUSAND/UL (ref 0–0.2)
IMM GRANULOCYTES NFR BLD AUTO: 0 % (ref 0–2)
LYMPHOCYTES # BLD AUTO: 2.36 THOUSANDS/ΜL (ref 0.6–4.47)
LYMPHOCYTES NFR BLD AUTO: 23 % (ref 14–44)
MCH RBC QN AUTO: 29.8 PG (ref 26.8–34.3)
MCHC RBC AUTO-ENTMCNC: 33.1 G/DL (ref 31.4–37.4)
MCV RBC AUTO: 90 FL (ref 82–98)
MONOCYTES # BLD AUTO: 1 THOUSAND/ΜL (ref 0.17–1.22)
MONOCYTES NFR BLD AUTO: 10 % (ref 4–12)
NEUTROPHILS # BLD AUTO: 6.35 THOUSANDS/ΜL (ref 1.85–7.62)
NEUTS SEG NFR BLD AUTO: 63 % (ref 43–75)
NRBC BLD AUTO-RTO: 0 /100 WBCS
PLATELET # BLD AUTO: 291 THOUSANDS/UL (ref 149–390)
PMV BLD AUTO: 9.2 FL (ref 8.9–12.7)
POTASSIUM SERPL-SCNC: 3 MMOL/L (ref 3.5–5.3)
POTASSIUM SERPL-SCNC: 3.8 MMOL/L (ref 3.5–5.3)
PROT SERPL-MCNC: 7.2 G/DL (ref 6.4–8.2)
RBC # BLD AUTO: 4.97 MILLION/UL (ref 3.88–5.62)
SODIUM SERPL-SCNC: 126 MMOL/L (ref 136–145)
SODIUM SERPL-SCNC: 140 MMOL/L (ref 136–145)
TROPONIN I SERPL-MCNC: <0.02 NG/ML
WBC # BLD AUTO: 10.1 THOUSAND/UL (ref 4.31–10.16)

## 2021-06-14 PROCEDURE — 93005 ELECTROCARDIOGRAM TRACING: CPT

## 2021-06-14 PROCEDURE — 99284 EMERGENCY DEPT VISIT MOD MDM: CPT

## 2021-06-14 PROCEDURE — 71046 X-RAY EXAM CHEST 2 VIEWS: CPT

## 2021-06-14 PROCEDURE — 80053 COMPREHEN METABOLIC PANEL: CPT | Performed by: EMERGENCY MEDICINE

## 2021-06-14 PROCEDURE — 96374 THER/PROPH/DIAG INJ IV PUSH: CPT

## 2021-06-14 PROCEDURE — 96361 HYDRATE IV INFUSION ADD-ON: CPT

## 2021-06-14 PROCEDURE — 84484 ASSAY OF TROPONIN QUANT: CPT | Performed by: EMERGENCY MEDICINE

## 2021-06-14 PROCEDURE — 96375 TX/PRO/DX INJ NEW DRUG ADDON: CPT

## 2021-06-14 PROCEDURE — 85379 FIBRIN DEGRADATION QUANT: CPT | Performed by: EMERGENCY MEDICINE

## 2021-06-14 PROCEDURE — 36415 COLL VENOUS BLD VENIPUNCTURE: CPT | Performed by: EMERGENCY MEDICINE

## 2021-06-14 PROCEDURE — 85025 COMPLETE CBC W/AUTO DIFF WBC: CPT | Performed by: EMERGENCY MEDICINE

## 2021-06-14 PROCEDURE — 80048 BASIC METABOLIC PNL TOTAL CA: CPT | Performed by: EMERGENCY MEDICINE

## 2021-06-14 PROCEDURE — 99285 EMERGENCY DEPT VISIT HI MDM: CPT | Performed by: EMERGENCY MEDICINE

## 2021-06-14 RX ORDER — HYDROXYZINE HYDROCHLORIDE 25 MG/1
25 TABLET, FILM COATED ORAL EVERY 6 HOURS PRN
Qty: 12 TABLET | Refills: 0 | Status: SHIPPED | OUTPATIENT
Start: 2021-06-14 | End: 2021-12-13

## 2021-06-14 RX ORDER — KETOROLAC TROMETHAMINE 30 MG/ML
30 INJECTION, SOLUTION INTRAMUSCULAR; INTRAVENOUS ONCE
Status: COMPLETED | OUTPATIENT
Start: 2021-06-14 | End: 2021-06-14

## 2021-06-14 RX ORDER — POTASSIUM CHLORIDE 20 MEQ/1
40 TABLET, EXTENDED RELEASE ORAL ONCE
Status: COMPLETED | OUTPATIENT
Start: 2021-06-14 | End: 2021-06-14

## 2021-06-14 RX ORDER — LORAZEPAM 2 MG/ML
0.5 INJECTION INTRAMUSCULAR ONCE
Status: COMPLETED | OUTPATIENT
Start: 2021-06-14 | End: 2021-06-14

## 2021-06-14 RX ADMIN — SODIUM CHLORIDE 1000 ML: 0.9 INJECTION, SOLUTION INTRAVENOUS at 22:00

## 2021-06-14 RX ADMIN — POTASSIUM CHLORIDE 40 MEQ: 1500 TABLET, EXTENDED RELEASE ORAL at 22:48

## 2021-06-14 RX ADMIN — KETOROLAC TROMETHAMINE 30 MG: 30 INJECTION, SOLUTION INTRAMUSCULAR; INTRAVENOUS at 22:01

## 2021-06-14 RX ADMIN — LORAZEPAM 0.5 MG: 2 INJECTION INTRAMUSCULAR; INTRAVENOUS at 22:02

## 2021-06-15 ENCOUNTER — OFFICE VISIT (OUTPATIENT)
Dept: FAMILY MEDICINE CLINIC | Facility: CLINIC | Age: 21
End: 2021-06-15
Payer: COMMERCIAL

## 2021-06-15 VITALS
HEART RATE: 85 BPM | HEIGHT: 69 IN | TEMPERATURE: 98.2 F | DIASTOLIC BLOOD PRESSURE: 70 MMHG | SYSTOLIC BLOOD PRESSURE: 118 MMHG | BODY MASS INDEX: 25.71 KG/M2 | WEIGHT: 173.6 LBS | OXYGEN SATURATION: 98 %

## 2021-06-15 DIAGNOSIS — F41.1 GENERALIZED ANXIETY DISORDER: Primary | ICD-10-CM

## 2021-06-15 DIAGNOSIS — F41.0 PANIC ATTACKS: ICD-10-CM

## 2021-06-15 DIAGNOSIS — F33.1 MODERATE EPISODE OF RECURRENT MAJOR DEPRESSIVE DISORDER (HCC): ICD-10-CM

## 2021-06-15 LAB
ATRIAL RATE: 76 BPM
P AXIS: 55 DEGREES
PR INTERVAL: 162 MS
QRS AXIS: 74 DEGREES
QRSD INTERVAL: 118 MS
QT INTERVAL: 354 MS
QTC INTERVAL: 398 MS
T WAVE AXIS: 71 DEGREES
VENTRICULAR RATE: 76 BPM

## 2021-06-15 PROCEDURE — 93010 ELECTROCARDIOGRAM REPORT: CPT | Performed by: INTERNAL MEDICINE

## 2021-06-15 PROCEDURE — 1036F TOBACCO NON-USER: CPT | Performed by: FAMILY MEDICINE

## 2021-06-15 PROCEDURE — 3725F SCREEN DEPRESSION PERFORMED: CPT | Performed by: FAMILY MEDICINE

## 2021-06-15 PROCEDURE — 99214 OFFICE O/P EST MOD 30 MIN: CPT | Performed by: FAMILY MEDICINE

## 2021-06-15 PROCEDURE — 3008F BODY MASS INDEX DOCD: CPT | Performed by: FAMILY MEDICINE

## 2021-06-15 RX ORDER — NAPROXEN 500 MG/1
500 TABLET ORAL 2 TIMES DAILY PRN
COMMUNITY
Start: 2021-06-03 | End: 2021-12-13

## 2021-06-15 RX ORDER — BUPROPION HYDROCHLORIDE 150 MG/1
150 TABLET ORAL EVERY MORNING
Qty: 90 TABLET | Refills: 3 | Status: SHIPPED | OUTPATIENT
Start: 2021-06-15 | End: 2022-05-19

## 2021-06-15 NOTE — PROGRESS NOTES
Assessment/Plan:     1  Generalized anxiety disorder  Assessment & Plan:  Spent approximately 15 of the 25 minutes visit reviewing treatment options  States his father is done well on Wellbutrin and would like to try this medication for depression and anxiety  We discussed treatment options  Consider switching to SSRI if this is not working or not tolerated  He will monitor for any worsening anxiety  Consider follow-up with psychiatrist if needed  Orders:  -     buPROPion (WELLBUTRIN XL) 150 mg 24 hr tablet; Take 1 tablet (150 mg total) by mouth every morning    2  Panic attacks    3  Moderate episode of recurrent major depressive disorder (HCC)  -     buPROPion (WELLBUTRIN XL) 150 mg 24 hr tablet; Take 1 tablet (150 mg total) by mouth every morning          Subjective:      Patient ID: Sukhwinder Dixon is a 23 y o  male  Patient was seen in emergency room recently over the last week for anxiety and he does admit to some anhedonia  No suicidality or hallucinations or delusions  No GI complaints  He states he is feeling less anxious today than yesterday  Sleep and appetite have been normal            The following portions of the patient's history were reviewed and updated as appropriate: allergies, current medications, past family history, past medical history, past social history, past surgical history, and problem list     Review of Systems   Constitutional: Negative  HENT: Negative  Eyes: Negative  Respiratory: Negative  Cardiovascular: Negative  Gastrointestinal: Negative  Endocrine: Negative  Genitourinary: Negative  Musculoskeletal: Negative  Skin: Negative  Allergic/Immunologic: Negative  Neurological: Negative  Hematological: Negative  Psychiatric/Behavioral: Positive for decreased concentration and dysphoric mood  Negative for agitation, behavioral problems, confusion, hallucinations, self-injury, sleep disturbance and suicidal ideas   The patient is nervous/anxious  The patient is not hyperactive  Objective:      /70 (BP Location: Left arm, Patient Position: Sitting, Cuff Size: Standard)   Pulse 85   Temp 98 2 °F (36 8 °C) (Temporal)   Ht 5' 9" (1 753 m)   Wt 78 7 kg (173 lb 9 6 oz)   SpO2 98%   BMI 25 64 kg/m²          Physical Exam  Vitals reviewed  Constitutional:       Appearance: He is well-developed  HENT:      Head: Normocephalic and atraumatic  Right Ear: External ear normal  Tympanic membrane is not erythematous or bulging  Left Ear: External ear normal  Tympanic membrane is not erythematous or bulging  Nose: Nose normal       Mouth/Throat:      Mouth: No oral lesions  Pharynx: No oropharyngeal exudate  Eyes:      General: No scleral icterus  Right eye: No discharge  Left eye: No discharge  Conjunctiva/sclera: Conjunctivae normal    Neck:      Thyroid: No thyromegaly  Cardiovascular:      Rate and Rhythm: Normal rate and regular rhythm  Heart sounds: Normal heart sounds  No murmur heard  No friction rub  No gallop  Pulmonary:      Effort: Pulmonary effort is normal  No respiratory distress  Breath sounds: No wheezing or rales  Chest:      Chest wall: No tenderness  Abdominal:      General: Bowel sounds are normal  There is no distension  Palpations: Abdomen is soft  There is no mass  Tenderness: There is no abdominal tenderness  There is no guarding or rebound  Musculoskeletal:         General: No tenderness or deformity  Normal range of motion  Cervical back: Normal range of motion and neck supple  Lymphadenopathy:      Cervical: No cervical adenopathy  Skin:     General: Skin is warm and dry  Coloration: Skin is not pale  Findings: No erythema or rash  Neurological:      Mental Status: He is alert and oriented to person, place, and time  Cranial Nerves: No cranial nerve deficit  Motor: No abnormal muscle tone  Coordination: Coordination normal       Deep Tendon Reflexes: Reflexes are normal and symmetric     Psychiatric:         Behavior: Behavior normal

## 2021-06-15 NOTE — ASSESSMENT & PLAN NOTE
Spent approximately 15 of the 25 minutes visit reviewing treatment options  States his father is done well on Wellbutrin and would like to try this medication for depression and anxiety  We discussed treatment options  Consider switching to SSRI if this is not working or not tolerated  He will monitor for any worsening anxiety  Consider follow-up with psychiatrist if needed

## 2021-06-15 NOTE — ED PROVIDER NOTES
History  Chief Complaint   Patient presents with    Anxiety     Pt arrives via em[facility] after having episode of sob and arm numbness/tingling while driving  per ems pt has increased stress at home  denies SI/HI  Pt reports feeling like this for about a month, specifically with sob  denies covid contacts  received vaccine  22 yo male who has had 1 month of constant palpitations, sweating, SOB and pleuritic chest pain throughout lungs, occasional numbness  Admits to feeling very stressed - has a 5 mo, they just found out she is pregnant with their second child, he just switched jobs after his back "locked up" and being in the pandemic  Father feels most of this is stress related  Got vaccinated - 2nd vaccine shortly before onset of all symptoms  + tobacco abuse, no drugs or alcohol  History provided by:  Patient   used: No    Medical Problem  Location:  Palpitations, pleuritic chest pain, sweating, shortness of breath   Severity:  Moderate  Onset quality:  Gradual  Duration:  1 month  Timing:  Constant  Progression:  Unchanged  Chronicity:  New  Worsened by:  Has a 5 mo child, wife pregnant with 2nd, recent back issues and changed jobs, COVID pandemic - a lot of stressors  Associated symptoms: chest pain (plueritic - diffuse) and shortness of breath    Associated symptoms: no abdominal pain, no congestion, no cough, no fatigue, no fever, no headaches, no loss of consciousness, no myalgias, no nausea, no rash, no rhinorrhea, no sore throat and no wheezing    Chest pain:     Quality comment:  Pleuritic    Severity:  Mild    Onset quality:  Gradual    Duration:  1 month    Timing:  Constant    Progression:  Unchanged  Shortness of breath:     Severity:  Mild    Onset quality:  Gradual    Duration:  1 month    Timing:  Constant    Progression:  Unchanged      Prior to Admission Medications   Prescriptions Last Dose Informant Patient Reported? Taking?    EPINEPHrine (EPIPEN 2-CUBA) 0 3 mg/0 3 mL SOAJ  Self Yes No   Sig: EpiPen 2-Jayme 0 3 MG/0 3ML Injection Solution Auto-injector  Use as directed, prn allergic reaction   Quantity: 1;  Refills: 1      Sheryl Ignacio DO ;  Start   Active  2 Solution Auto-injector Pen   Na Sulfate-K Sulfate-Mg Sulf (Suprep Bowel Prep Kit) 17 5-3 13-1 6 GM/177ML SOLN  Self No No   Sig: Take 2 Bottles by mouth see administration instructions Please follow the instructions from the office   Patient not taking: Reported on 2021   albuterol (ACCUNEB) 1 25 MG/3ML nebulizer solution  Self Yes No   Sig: Inhale 1 ampule every 6 (six) hours as needed   albuterol (Ventolin HFA) 90 mcg/act inhaler  Self No No   Sig: Inhale 2 puffs every 6 (six) hours as needed for wheezing   dicyclomine (BENTYL) 10 mg capsule   No No   Sig: take 1 capsule by mouth three times a day before meals   famotidine (PEPCID) 20 mg tablet   No No   Sig: Take 1 tablet (20 mg total) by mouth 2 (two) times a day   fluticasone (FLONASE) 50 mcg/act nasal spray  Self No No   Si sprays into each nostril daily   fluticasone (FLOVENT HFA) 110 MCG/ACT inhaler  Self No No   Sig: Inhale 2 puffs 2 (two) times a day Rinse mouth after use    magnesium citrate (CITROMA) 1 745 g/30 mL oral solution   No No   Sig: Take 296 mL by mouth once for 1 dose   montelukast (SINGULAIR) 10 mg tablet  Self No No   Sig: Take 1 tablet (10 mg total) by mouth daily at bedtime   ondansetron (ZOFRAN-ODT) 4 mg disintegrating tablet  Self No No   Sig: Take 1 tablet (4 mg total) by mouth every 6 (six) hours as needed for nausea or vomiting   polyethylene glycol (GOLYTELY) 4000 mL solution   No No   Sig: Take 4,000 mL by mouth once for 1 dose   valsartan (DIOVAN) 80 mg tablet  Self No No   Sig: Take 1 tablet (80 mg total) by mouth daily   Patient taking differently: Take 40 mg by mouth daily Pt started halfing his dose d/t side effects      Facility-Administered Medications: None       Past Medical History:   Diagnosis Date    Asthma     Asthma due to seasonal allergies     GERD (gastroesophageal reflux disease)     Hypertension        Past Surgical History:   Procedure Laterality Date    COLONOSCOPY      NO PAST SURGERIES      UPPER GASTROINTESTINAL ENDOSCOPY      WISDOM TOOTH EXTRACTION         History reviewed  No pertinent family history  I have reviewed and agree with the history as documented  E-Cigarette/Vaping    E-Cigarette Use Current Every Day User     Start Date 14     Cartridges/Day 1 cartridge every 2 weeks      E-Cigarette/Vaping Substances    Nicotine Yes     Flavoring Yes      Social History     Tobacco Use    Smoking status: Former Smoker     Types: Cigarettes     Quit date: 2019     Years since quittin 5    Smokeless tobacco: Never Used    Tobacco comment: vapes   Vaping Use    Vaping Use: Every day    Start date: 2014    Substances: Nicotine, Flavoring   Substance Use Topics    Alcohol use: Yes     Comment: Rare    Drug use: No       Review of Systems   Constitutional: Positive for diaphoresis  Negative for fatigue and fever  HENT: Negative for congestion, rhinorrhea and sore throat  Respiratory: Positive for shortness of breath  Negative for cough, chest tightness and wheezing  Cardiovascular: Positive for chest pain (plueritic - diffuse) and palpitations  Negative for leg swelling  Gastrointestinal: Negative for abdominal pain and nausea  Musculoskeletal: Negative for myalgias  Skin: Negative for rash  Neurological: Positive for numbness (occasional on face and shoulder)  Negative for loss of consciousness and headaches  Psychiatric/Behavioral: The patient is nervous/anxious  All other systems reviewed and are negative  Physical Exam  Physical Exam  Vitals and nursing note reviewed  Constitutional:       General: He is not in acute distress  Appearance: He is well-developed  HENT:      Head: Normocephalic and atraumatic        Right Ear: External ear normal       Left Ear: External ear normal       Mouth/Throat:      Mouth: Mucous membranes are moist    Eyes:      Extraocular Movements: Extraocular movements intact  Cardiovascular:      Rate and Rhythm: Normal rate and regular rhythm  Heart sounds: No murmur heard  Pulmonary:      Effort: Pulmonary effort is normal  No respiratory distress  Breath sounds: Normal breath sounds  No wheezing or rales  Abdominal:      General: Bowel sounds are normal  There is no distension  Palpations: Abdomen is soft  Tenderness: There is no abdominal tenderness  Musculoskeletal:         General: Normal range of motion  Cervical back: Neck supple  Skin:     General: Skin is warm  Coloration: Skin is not pale  Findings: No rash  Neurological:      Mental Status: He is alert and oriented to person, place, and time     Psychiatric:         Behavior: Behavior normal          Vital Signs  ED Triage Vitals [06/14/21 2057]   Temperature Pulse Respirations Blood Pressure SpO2   98 5 °F (36 9 °C) 87 18 126/87 99 %      Temp Source Heart Rate Source Patient Position - Orthostatic VS BP Location FiO2 (%)   Oral Monitor Lying Left arm --      Pain Score       No Pain           Vitals:    06/14/21 2057 06/14/21 2320   BP: 126/87 120/88   Pulse: 87 85   Patient Position - Orthostatic VS: Lying Lying         Visual Acuity      ED Medications  Medications   sodium chloride 0 9 % bolus 1,000 mL (0 mL Intravenous Stopped 6/14/21 2249)   ketorolac (TORADOL) injection 30 mg (30 mg Intravenous Given 6/14/21 2201)   LORazepam (ATIVAN) injection 0 5 mg (0 5 mg Intravenous Given 6/14/21 2202)   potassium chloride (K-DUR,KLOR-CON) CR tablet 40 mEq (40 mEq Oral Given 6/14/21 2248)       Diagnostic Studies  Results Reviewed     Procedure Component Value Units Date/Time    Basic metabolic panel [[id number]]  (Abnormal) Collected: 06/14/21 2251    Lab Status: Final result Specimen: Blood from Arm, Left Updated: 06/14/21 2307     Sodium 140 mmol/L      Potassium 3 8 mmol/L      Chloride 107 mmol/L      CO2 27 mmol/L      ANION GAP 6 mmol/L      BUN 11 mg/dL      Creatinine 1 01 mg/dL      Glucose 100 mg/dL      Calcium 8 2 mg/dL      eGFR 105 ml/min/1 73sq m     Narrative:      [place] guidelines for Chronic Kidney Disease (CKD):     Stage 1 with normal or high GFR (GFR > 90 mL/min/1 73 square meters)    Stage 2 Mild CKD (GFR = 60-89 mL/min/1 73 square meters)    Stage 3A Moderate CKD (GFR = 45-59 mL/min/1 73 square meters)    Stage 3B Moderate CKD (GFR = 30-44 mL/min/1 73 square meters)    Stage 4 Severe CKD (GFR = 15-29 mL/min/1 73 square meters)    Stage 5 End Stage CKD (GFR <15 mL/min/1 73 square meters)  Note: GFR calculation is accurate only with a steady state creatinine    Comprehensive metabolic panel [[id number]]  (Abnormal) Collected: 06/14/21 2159    Lab Status: Final result Specimen: Blood from Arm, Left Updated: 06/14/21 2234     Sodium 126 mmol/L      Potassium 3 0 mmol/L      Chloride 93 mmol/L      CO2 29 mmol/L      ANION GAP 4 mmol/L      BUN 12 mg/dL      Creatinine 1 02 mg/dL      Glucose 93 mg/dL      Calcium 8 8 mg/dL      AST 22 U/L      ALT 46 U/L      Alkaline Phosphatase 61 U/L      Total Protein 7 2 g/dL      Albumin 3 9 g/dL      Total Bilirubin 0 34 mg/dL      eGFR 104 ml/min/1 73sq m     Narrative:      [place] guidelines for Chronic Kidney Disease (CKD):     Stage 1 with normal or high GFR (GFR > 90 mL/min/1 73 square meters)    Stage 2 Mild CKD (GFR = 60-89 mL/min/1 73 square meters)    Stage 3A Moderate CKD (GFR = 45-59 mL/min/1 73 square meters)    Stage 3B Moderate CKD (GFR = 30-44 mL/min/1 73 square meters)    Stage 4 Severe CKD (GFR = 15-29 mL/min/1 73 square meters)    Stage 5 End Stage CKD (GFR <15 mL/min/1 73 square meters)  Note: GFR calculation is accurate only with a steady state creatinine    Troponin I [[id number]]  (Normal) Collected: 06/14/21 2159    Lab Status: Final result Specimen: Blood from Arm, Left Updated: 06/14/21 2222     Troponin I <0 02 ng/mL     D-Dimer [[id number]]  (Normal) Collected: 06/14/21 2159    Lab Status: Final result Specimen: Blood from Arm, Left Updated: 06/14/21 2218     D-Dimer, Quant <0 27 ug/ml FEU     CBC and differential [[id number]] Collected: 06/14/21 2159    Lab Status: Final result Specimen: Blood from Arm, Left Updated: 06/14/21 2204     WBC 10 10 Thousand/uL      RBC 4 97 Million/uL      Hemoglobin 14 8 g/dL      Hematocrit 44 7 %      MCV 90 fL      MCH 29 8 pg      MCHC 33 1 g/dL      RDW 12 1 %      MPV 9 2 fL      Platelets 674 Thousands/uL      nRBC 0 /100 WBCs      Neutrophils Relative 63 %      Immat GRANS % 0 %      Lymphocytes Relative 23 %      Monocytes Relative 10 %      Eosinophils Relative 3 %      Basophils Relative 1 %      Neutrophils Absolute 6 35 Thousands/µL      Immature Grans Absolute 0 04 Thousand/uL      Lymphocytes Absolute 2 36 Thousands/µL      Monocytes Absolute 1 00 Thousand/µL      Eosinophils Absolute 0 28 Thousand/µL      Basophils Absolute 0 07 Thousands/µL                  XR chest 2 views   Final Result by Sameer Sharma MD (06/14 2315)      No acute cardiopulmonary disease  Workstation performed: [id number]                    Procedures  ECG 12 Lead Documentation Only    Date/Time: 6/14/2021 9:43 PM  Performed by: Ronnie Chi DO  Authorized by: Ronnie Chi DO     Indications / Diagnosis:  CP/SOB  Patient location:  ED  Previous ECG:     Previous ECG:  Compared to current    Similarity:  No change  Interpretation:     Interpretation: normal    Rate:     ECG rate:  76    ECG rate assessment: normal    Rhythm:     Rhythm: sinus rhythm    Ectopy:     Ectopy: none    QRS:     QRS axis: incomplete RBBB    Conduction:     Conduction: normal    ST segments:     ST segments:  Normal  T waves:     T waves: normal ED Course  ED Course as of Everardo 15 0145   Mon Jun 14, 2021   2110 Pt seen and examined  22 yo male who has had 1 month of constant palpitations, sweating, SOB and pleuritic chest pain throughout lungs, occasional numbness  Admits to feeling very stressed - has a 5 mo, they just found out she is pregnant with their second child, he just switched jobs after his back "locked up" and being in the pandemic  Father feels most of this is stress related  Got vaccinated - 2nd vaccine shortly before onset of all symptoms  + tobacco abuse, no drugs or alcohol  Will check labs including ddimer, chest xray and give IV toradol and ativan 0 5mg and reassess  2225 Trop and ddimer both WNL  Chest x ray neg for acute findings  2245 CMP showed K 3 0 - will replete orally with 40meq kdur, Na 126 - will repeat        2309 Repeat Na 140  - was lab error  Will d/c home on prn atarax  SBIRT 20yo+      Most Recent Value   SBIRT (22 yo +)   In order to provide better care to our patients, we are screening all of our patients for alcohol and drug use  Would it be okay to ask you these screening questions? No Filed at: 06/14/2021 2059                    MDM    Disposition  Final diagnoses:   Anxiety   Stress at home   SOB (shortness of breath)   Chest discomfort     Time reflects when diagnosis was documented in both MDM as applicable and the Disposition within this note     Time User Action Codes Description Comment    6/14/2021 11:09 PM Leticia Pat Add [F41 9] Anxiety     6/14/2021 11:10 PM Leticia Pat Add [F43 9] Stress at home     6/14/2021 11:10 PM Leticia Pat Add [R06 02] SOB (shortness of breath)     6/14/2021 11:10 PM Leticia Pat Add [R07 89] Chest discomfort       ED Disposition     ED Disposition Condition Date/Time Comment    Discharge Stable Mon Jun 14, 2021 11:09 PM [employer] discharge to home/self care              Follow-up Information     Follow up With Specialties Details Why Contact Chris Negrete DO Family Medicine Schedule an appointment as soon as possible for a visit  to discuss medications for anxiety as we discussed Shameka Morris  [phone]            Discharge Medication List as of 6/14/2021 11:11 PM      START taking these medications    Details   hydrOXYzine HCL (ATARAX) 25 mg tablet Take 1 tablet (25 mg total) by mouth every 6 (six) hours as needed for anxiety, Starting Mon 6/14/2021, Normal         CONTINUE these medications which have NOT CHANGED    Details   albuterol (ACCUNEB) 1 25 MG/3ML nebulizer solution Inhale 1 ampule every 6 (six) hours as needed, Historical Med      albuterol (Ventolin HFA) 90 mcg/act inhaler Inhale 2 puffs every 6 (six) hours as needed for wheezing, Starting Tue 4/6/2021, Normal      dicyclomine (BENTYL) 10 mg capsule take 1 capsule by mouth three times a day before meals, Normal      EPINEPHrine (EPIPEN 2-JAYME) 0 3 mg/0 3 mL SOAJ EpiPen 2-Jayme 0 3 MG/0 3ML Injection Solution Auto-injector  Use as directed, prn allergic reaction   Quantity: 1;  Refills: 1      Quincy Ignacio DO ;  Start 31-July-2014  Active  2 Solution Auto-injector Pen, Historical Med      famotidine (PEPCID) 20 mg tablet Take 1 tablet (20 mg total) by mouth 2 (two) times a day, Starting Fri 4/23/2021, Normal      fluticasone (FLONASE) 50 mcg/act nasal spray 2 sprays into each nostril daily, Starting Fri 10/2/2020, Until Sun 11/1/2020, Normal      fluticasone (FLOVENT HFA) 110 MCG/ACT inhaler Inhale 2 puffs 2 (two) times a day Rinse mouth after use , Starting Tue 4/6/2021, Normal      magnesium citrate (CITROMA) 1 745 g/30 mL oral solution Take 296 mL by mouth once for 1 dose, Starting u 12/31/2020, Normal      montelukast (SINGULAIR) 10 mg tablet Take 1 tablet (10 mg total) by mouth daily at bedtime, Starting Fri 3/5/2021, Normal      Na Sulfate-K Sulfate-Mg Sulf (Suprep Bowel Prep Kit) 17 5-3 13-1 6 GM/177ML SOLN Take 2 Bottles by mouth see administration instructions Please follow the instructions from the office, Starting Fri 10/30/2020, Normal      ondansetron (ZOFRAN-ODT) 4 mg disintegrating tablet Take 1 tablet (4 mg total) by mouth every 6 (six) hours as needed for nausea or vomiting, Starting Wed 2/24/2021, Normal      polyethylene glycol (GOLYTELY) 4000 mL solution Take 4,000 mL by mouth once for 1 dose, Starting Thu 12/31/2020, Normal      valsartan (DIOVAN) 80 mg tablet Take 1 tablet (80 mg total) by mouth daily, Starting Tue 12/29/2020, Normal           No discharge procedures on file      PDMP Review     None          ED Provider  Electronically Signed by           Emmie Diaz DO  06/15/21 0141

## 2021-06-15 NOTE — PROGRESS NOTES
BMI Counseling: Body mass index is 25 64 kg/m²  The BMI is above normal  Nutrition recommendations include reducing portion sizes

## 2021-06-17 DIAGNOSIS — I10 ESSENTIAL HYPERTENSION: ICD-10-CM

## 2021-06-18 RX ORDER — VALSARTAN 80 MG/1
TABLET ORAL
Qty: 90 TABLET | Refills: 1 | Status: SHIPPED | OUTPATIENT
Start: 2021-06-18 | End: 2021-12-05

## 2021-07-12 ENCOUNTER — APPOINTMENT (EMERGENCY)
Dept: RADIOLOGY | Facility: HOSPITAL | Age: 21
End: 2021-07-12
Payer: COMMERCIAL

## 2021-07-12 ENCOUNTER — HOSPITAL ENCOUNTER (EMERGENCY)
Facility: HOSPITAL | Age: 21
Discharge: HOME | End: 2021-07-12
Attending: EMERGENCY MEDICINE | Admitting: EMERGENCY MEDICINE
Payer: COMMERCIAL

## 2021-07-12 VITALS
HEART RATE: 74 BPM | SYSTOLIC BLOOD PRESSURE: 117 MMHG | RESPIRATION RATE: 17 BRPM | TEMPERATURE: 98.2 F | DIASTOLIC BLOOD PRESSURE: 73 MMHG | OXYGEN SATURATION: 96 % | BODY MASS INDEX: 25.39 KG/M2 | WEIGHT: 171.96 LBS

## 2021-07-12 DIAGNOSIS — H93.13 TINNITUS OF BOTH EARS: ICD-10-CM

## 2021-07-12 DIAGNOSIS — R07.9 CHEST PAIN: Primary | ICD-10-CM

## 2021-07-12 DIAGNOSIS — H69.80 EUSTACHIAN TUBE DYSFUNCTION: ICD-10-CM

## 2021-07-12 LAB
ALBUMIN SERPL BCP-MCNC: 4.2 G/DL (ref 3.5–5)
ALP SERPL-CCNC: 57 U/L (ref 46–116)
ALT SERPL W P-5'-P-CCNC: 32 U/L (ref 12–78)
ANION GAP SERPL CALCULATED.3IONS-SCNC: 9 MMOL/L (ref 4–13)
AST SERPL W P-5'-P-CCNC: 18 U/L (ref 5–45)
ATRIAL RATE: 73 BPM
BASOPHILS # BLD AUTO: 0.06 THOUSANDS/ΜL (ref 0–0.1)
BASOPHILS NFR BLD AUTO: 1 % (ref 0–1)
BILIRUB DIRECT SERPL-MCNC: 0.1 MG/DL (ref 0–0.2)
BILIRUB SERPL-MCNC: 0.45 MG/DL (ref 0.2–1)
BUN SERPL-MCNC: 10 MG/DL (ref 5–25)
CALCIUM SERPL-MCNC: 9 MG/DL (ref 8.3–10.1)
CHLORIDE SERPL-SCNC: 104 MMOL/L (ref 100–108)
CO2 SERPL-SCNC: 28 MMOL/L (ref 21–32)
CREAT SERPL-MCNC: 1.09 MG/DL (ref 0.6–1.3)
EOSINOPHIL # BLD AUTO: 0.22 THOUSAND/ΜL (ref 0–0.61)
EOSINOPHIL NFR BLD AUTO: 3 % (ref 0–6)
ERYTHROCYTE [DISTWIDTH] IN BLOOD BY AUTOMATED COUNT: 12 % (ref 11.6–15.1)
GFR SERPL CREATININE-BSD FRML MDRD: 96 ML/MIN/1.73SQ M
GLUCOSE SERPL-MCNC: 86 MG/DL (ref 65–140)
HCT VFR BLD AUTO: 46.4 % (ref 36.5–49.3)
HGB BLD-MCNC: 15.2 G/DL (ref 12–17)
IMM GRANULOCYTES # BLD AUTO: 0.05 THOUSAND/UL (ref 0–0.2)
IMM GRANULOCYTES NFR BLD AUTO: 1 % (ref 0–2)
LYMPHOCYTES # BLD AUTO: 2.28 THOUSANDS/ΜL (ref 0.6–4.47)
LYMPHOCYTES NFR BLD AUTO: 26 % (ref 14–44)
MCH RBC QN AUTO: 29.6 PG (ref 26.8–34.3)
MCHC RBC AUTO-ENTMCNC: 32.8 G/DL (ref 31.4–37.4)
MCV RBC AUTO: 90 FL (ref 82–98)
MONOCYTES # BLD AUTO: 0.76 THOUSAND/ΜL (ref 0.17–1.22)
MONOCYTES NFR BLD AUTO: 9 % (ref 4–12)
NEUTROPHILS # BLD AUTO: 5.45 THOUSANDS/ΜL (ref 1.85–7.62)
NEUTS SEG NFR BLD AUTO: 60 % (ref 43–75)
NRBC BLD AUTO-RTO: 0 /100 WBCS
P AXIS: 32 DEGREES
PLATELET # BLD AUTO: 307 THOUSANDS/UL (ref 149–390)
PMV BLD AUTO: 9 FL (ref 8.9–12.7)
POTASSIUM SERPL-SCNC: 3.9 MMOL/L (ref 3.5–5.3)
PR INTERVAL: 156 MS
PROT SERPL-MCNC: 7.8 G/DL (ref 6.4–8.2)
QRS AXIS: 58 DEGREES
QRSD INTERVAL: 104 MS
QT INTERVAL: 356 MS
QTC INTERVAL: 392 MS
RBC # BLD AUTO: 5.14 MILLION/UL (ref 3.88–5.62)
SODIUM SERPL-SCNC: 141 MMOL/L (ref 136–145)
T WAVE AXIS: 24 DEGREES
TROPONIN I SERPL-MCNC: <0.02 NG/ML
TSH SERPL DL<=0.05 MIU/L-ACNC: 2.75 UIU/ML (ref 0.36–3.74)
VENTRICULAR RATE: 73 BPM
WBC # BLD AUTO: 8.82 THOUSAND/UL (ref 4.31–10.16)

## 2021-07-12 PROCEDURE — 36415 COLL VENOUS BLD VENIPUNCTURE: CPT | Performed by: EMERGENCY MEDICINE

## 2021-07-12 PROCEDURE — 99285 EMERGENCY DEPT VISIT HI MDM: CPT

## 2021-07-12 PROCEDURE — 93005 ELECTROCARDIOGRAM TRACING: CPT

## 2021-07-12 PROCEDURE — 84484 ASSAY OF TROPONIN QUANT: CPT | Performed by: EMERGENCY MEDICINE

## 2021-07-12 PROCEDURE — 80048 BASIC METABOLIC PNL TOTAL CA: CPT | Performed by: EMERGENCY MEDICINE

## 2021-07-12 PROCEDURE — 84443 ASSAY THYROID STIM HORMONE: CPT | Performed by: EMERGENCY MEDICINE

## 2021-07-12 PROCEDURE — 93010 ELECTROCARDIOGRAM REPORT: CPT | Performed by: INTERNAL MEDICINE

## 2021-07-12 PROCEDURE — 80076 HEPATIC FUNCTION PANEL: CPT | Performed by: EMERGENCY MEDICINE

## 2021-07-12 PROCEDURE — 71046 X-RAY EXAM CHEST 2 VIEWS: CPT

## 2021-07-12 PROCEDURE — 85025 COMPLETE CBC W/AUTO DIFF WBC: CPT | Performed by: EMERGENCY MEDICINE

## 2021-07-12 PROCEDURE — 99285 EMERGENCY DEPT VISIT HI MDM: CPT | Performed by: EMERGENCY MEDICINE

## 2021-07-12 RX ORDER — NAPROXEN 500 MG/1
500 TABLET ORAL 2 TIMES DAILY WITH MEALS
Qty: 20 TABLET | Refills: 0 | Status: SHIPPED | OUTPATIENT
Start: 2021-07-12 | End: 2021-12-13

## 2021-07-12 RX ORDER — FLUTICASONE PROPIONATE 50 MCG
1 SPRAY, SUSPENSION (ML) NASAL DAILY
Qty: 16 G | Refills: 0 | Status: SHIPPED | OUTPATIENT
Start: 2021-07-12

## 2021-07-12 NOTE — ED PROVIDER NOTES
History  Chief Complaint   Patient presents with    Chest Pain     left side cp x1wk reprots sweating, cough, also sob  22 YO male presents with chest pain over the last week  Pt states this has been a sharp pain in the Left chest, intermittent, non-radiating  He denies known exacerbating or alleviating factors  Pt denies similar in the past  States this has become more frequent as it has progressed  States occasionally the pain will last for seconds but occasionally he will have this pain for up to an hour before it resolves spontaneously  Pt denies known cardiac issues, he does have SOB when he has this discomfort  Pt currently denies chest pain  Pt additionally notes a feeling of fullness in the B/L ears and tinnitus  He is unable to open his eustachian tubes with gentle pressure  Pt denies F/C/N/V/D/C, no dysuria, burning on urination or blood in urine  History provided by:  Patient   used: No    Chest Pain  Pain location:  L chest  Pain quality: sharp    Pain radiates to:  Does not radiate  Pain severity:  Moderate  Onset quality:  Gradual  Duration:  1 week  Timing:  Intermittent  Progression:  Waxing and waning  Chronicity:  New  Relieved by:  Nothing  Worsened by:  Nothing tried  Associated symptoms: no abdominal pain, no dizziness, no fever, no nausea, no shortness of breath, not vomiting and no weakness        Prior to Admission Medications   Prescriptions Last Dose Informant Patient Reported? Taking?    EPINEPHrine (EPIPEN 2-CUBA) 0 3 mg/0 3 mL SOAJ  Self Yes Yes   Sig: EpiPen 2-[place] 0 3 MG/0 3ML Injection Solution Auto-injector  Use as directed, prn allergic reaction   Quantity: 1;  Refills: 1      Quincy Ignacio DO ;  Start 31-July-2014  Active  2 Solution Auto-injector Pen   Na Sulfate-K Sulfate-Mg Sulf (Suprep Bowel Prep Kit) 17 5-3 13-1 6 GM/177ML SOLN Not Taking at Unknown time Self No No   Sig: Take 2 Bottles by mouth see administration instructions Please follow the instructions from the office   Patient not taking: Reported on 2021   albuterol (ACCUNEB) 1 25 MG/3ML nebulizer solution  Self Yes Yes   Sig: Inhale 1 ampule every 6 (six) hours as needed   albuterol (Ventolin HFA) 90 mcg/act inhaler  Self No Yes   Sig: Inhale 2 puffs every 6 (six) hours as needed for wheezing   buPROPion (WELLBUTRIN XL) 150 mg 24 hr tablet   No Yes   Sig: Take 1 tablet (150 mg total) by mouth every morning   dicyclomine (BENTYL) 10 mg capsule   No No   Sig: take 1 capsule by mouth three times a day before meals   Patient not taking: Reported on 6/15/2021   famotidine (PEPCID) 20 mg tablet   No Yes   Sig: Take 1 tablet (20 mg total) by mouth 2 (two) times a day   fluticasone (FLONASE) 50 mcg/act nasal spray  Self No Yes   Si sprays into each nostril daily   fluticasone (FLOVENT HFA) 110 MCG/ACT inhaler  Self No No   Sig: Inhale 2 puffs 2 (two) times a day Rinse mouth after use    hydrOXYzine HCL (ATARAX) 25 mg tablet Not Taking at Unknown time  No No   Sig: Take 1 tablet (25 mg total) by mouth every 6 (six) hours as needed for anxiety   Patient not taking: Reported on 6/15/2021   magnesium citrate (CITROMA) 1 745 g/30 mL oral solution   No No   Sig: Take 296 mL by mouth once for 1 dose   montelukast (SINGULAIR) 10 mg tablet  Self No Yes   Sig: Take 1 tablet (10 mg total) by mouth daily at bedtime   naproxen (NAPROSYN) 500 mg tablet Not Taking at Unknown time  Yes No   Sig: Take 500 mg by mouth 2 (two) times a day as needed Take with food   Patient not taking: Reported on 2021   ondansetron (ZOFRAN-ODT) 4 mg disintegrating tablet Not Taking at Unknown time Self No No   Sig: Take 1 tablet (4 mg total) by mouth every 6 (six) hours as needed for nausea or vomiting   Patient not taking: Reported on 2021   polyethylene glycol (GOLYTELY) 4000 mL solution   No No   Sig: Take 4,000 mL by mouth once for 1 dose   valsartan (DIOVAN) 80 mg tablet   No Yes   Sig: take 1 tablet by mouth once daily      Facility-Administered Medications: None       Past Medical History:   Diagnosis Date    Asthma     Asthma due to seasonal allergies     GERD (gastroesophageal reflux disease)     Hypertension        Past Surgical History:   Procedure Laterality Date    COLONOSCOPY      NO PAST SURGERIES      UPPER GASTROINTESTINAL ENDOSCOPY      WISDOM TOOTH EXTRACTION         History reviewed  No pertinent family history  I have reviewed and agree with the history as documented  E-Cigarette/Vaping    E-Cigarette Use Current Every Day User     Start Date 14     Cartridges/Day 1 cartridge every 2 weeks      E-Cigarette/Vaping Substances    Nicotine Yes     THC No     CBD No     Flavoring Yes     Other No     Unknown No      Social History     Tobacco Use    Smoking status: Former Smoker     Types: Cigarettes     Quit date: 2019     Years since quittin 6    Smokeless tobacco: Never Used    Tobacco comment: vapes   Vaping Use    Vaping Use: Every day    Start date: 2014    Substances: Nicotine, Flavoring   Substance Use Topics    Alcohol use: Yes     Comment: Rare    Drug use: No       Review of Systems   Constitutional: Negative for fever  HENT: Negative for dental problem  Eyes: Negative for visual disturbance  Respiratory: Negative for shortness of breath  Cardiovascular: Positive for chest pain  Gastrointestinal: Negative for abdominal pain, nausea and vomiting  Genitourinary: Negative for dysuria and frequency  Musculoskeletal: Negative for neck pain and neck stiffness  Skin: Negative for rash  Neurological: Negative for dizziness, weakness and light-headedness  Psychiatric/Behavioral: Negative for agitation, behavioral problems and confusion  All other systems reviewed and are negative  Physical Exam  Physical Exam  Vitals and nursing note reviewed  Constitutional:       Appearance: He is well-developed     HENT:      Head: Normocephalic and atraumatic  Right Ear: Tympanic membrane normal       Left Ear: Tympanic membrane normal    Eyes:      Extraocular Movements: Extraocular movements intact  Cardiovascular:      Rate and Rhythm: Normal rate and regular rhythm  Pulses: Normal pulses  Heart sounds: Normal heart sounds  No murmur heard  Pulmonary:      Effort: Pulmonary effort is normal       Breath sounds: Normal breath sounds  Chest:      Chest wall: No tenderness  Abdominal:      General: There is no distension  Musculoskeletal:         General: Normal range of motion  Cervical back: Normal range of motion  Skin:     Findings: No rash  Neurological:      Mental Status: He is alert and oriented to person, place, and time     Psychiatric:         Behavior: Behavior normal          Vital Signs  ED Triage Vitals   Temperature Pulse Respirations Blood Pressure SpO2   07/12/21 1256 07/12/21 1256 07/12/21 1256 07/12/21 1256 07/12/21 1256   98 2 °F (36 8 °C) 95 16 134/70 98 %      Temp Source Heart Rate Source Patient Position - Orthostatic VS BP Location FiO2 (%)   07/12/21 1256 07/12/21 1511 07/12/21 1256 07/12/21 1256 --   Oral Monitor Sitting Right arm       Pain Score       07/12/21 1256       4           Vitals:    07/12/21 1256 07/12/21 1511 07/12/21 1605   BP: 134/70 127/79 117/73   Pulse: 95 89 74   Patient Position - Orthostatic VS: Sitting Lying Lying         Visual Acuity      ED Medications  Medications - No data to display    Diagnostic Studies  Results Reviewed     Procedure Component Value Units Date/Time    Hepatic function panel [[id number]]  (Normal) Collected: 07/12/21 1516    Lab Status: Final result Specimen: Blood from Arm, Left Updated: 07/12/21 1550     Total Bilirubin 0 45 mg/dL      Bilirubin, Direct 0 10 mg/dL      Alkaline Phosphatase 57 U/L      AST 18 U/L      ALT 32 U/L      Total Protein 7 8 g/dL      Albumin 4 2 g/dL     TSH [[id number]]  (Normal) Collected: 07/12/21 1516    Lab Status: Final result Specimen: Blood from Arm, Left Updated: 07/12/21 1550     TSH 3RD GENERATON 2 747 uIU/mL     Narrative:      Patients undergoing fluorescein dye angiography may retain small amounts of fluorescein in the body for 48-72 hours post procedure  Samples containing fluorescein can produce falsely depressed TSH values  If the patient had this procedure,a specimen should be resubmitted post fluorescein clearance        Troponin I [[id number]]  (Normal) Collected: 07/12/21 1516    Lab Status: Final result Specimen: Blood from Arm, Left Updated: 07/12/21 1542     Troponin I <0 02 ng/mL     Basic metabolic panel [[id number]] Collected: 07/12/21 1516    Lab Status: Final result Specimen: Blood from Arm, Left Updated: 07/12/21 1541     Sodium 141 mmol/L      Potassium 3 9 mmol/L      Chloride 104 mmol/L      CO2 28 mmol/L      ANION GAP 9 mmol/L      BUN 10 mg/dL      Creatinine 1 09 mg/dL      Glucose 86 mg/dL      Calcium 9 0 mg/dL      eGFR 96 ml/min/1 73sq m     Narrative:      [facility] guidelines for Chronic Kidney Disease (CKD):     Stage 1 with normal or high GFR (GFR > 90 mL/min/1 73 square meters)    Stage 2 Mild CKD (GFR = 60-89 mL/min/1 73 square meters)    Stage 3A Moderate CKD (GFR = 45-59 mL/min/1 73 square meters)    Stage 3B Moderate CKD (GFR = 30-44 mL/min/1 73 square meters)    Stage 4 Severe CKD (GFR = 15-29 mL/min/1 73 square meters)    Stage 5 End Stage CKD (GFR <15 mL/min/1 73 square meters)  Note: GFR calculation is accurate only with a steady state creatinine    CBC and differential [[id number]] Collected: 07/12/21 1520    Lab Status: Final result Specimen: Blood from Arm, Left Updated: 07/12/21 1527     WBC 8 82 Thousand/uL      RBC 5 14 Million/uL      Hemoglobin 15 2 g/dL      Hematocrit 46 4 %      MCV 90 fL      MCH 29 6 pg      MCHC 32 8 g/dL      RDW 12 0 %      MPV 9 0 fL      Platelets 588 Thousands/uL      nRBC 0 /100 WBCs      Neutrophils Relative 60 % Immat GRANS % 1 %      Lymphocytes Relative 26 %      Monocytes Relative 9 %      Eosinophils Relative 3 %      Basophils Relative 1 %      Neutrophils Absolute 5 45 Thousands/µL      Immature Grans Absolute 0 05 Thousand/uL      Lymphocytes Absolute 2 28 Thousands/µL      Monocytes Absolute 0 76 Thousand/µL      Eosinophils Absolute 0 22 Thousand/µL      Basophils Absolute 0 06 Thousands/µL                  XR chest 2 views   ED Interpretation by Xiomy Camarillo MD (07/12 0645)   No PNA      Final Result by Deep Blanca MD (07/12 5940)      No acute cardiopulmonary disease  Workstation performed: [id number]                    Procedures  ECG 12 Lead Documentation Only    Date/Time: 7/12/2021 3:04 PM  Performed by: Xiomy Camarillo MD  Authorized by: Xiomy Camarillo MD     ECG reviewed by me, the ED Provider: yes    Patient location:  ED  Interpretation:     Interpretation: normal    Rate:     ECG rate:  73    ECG rate assessment: normal    Rhythm:     Rhythm: sinus rhythm    QRS:     QRS axis:  Normal    QRS intervals:  Normal  Conduction:     Conduction: normal    ST segments:     ST segments:  Normal  T waves:     T waves: normal               ED Course                                           MDM  Number of Diagnoses or Management Options  Chest pain: new and requires workup  Eustachian tube dysfunction: new and requires workup  Tinnitus of both ears: new and requires workup  Diagnosis management comments: 1  Chest pain - Pt with no known cardiac issues, intermittent pain that is not exertional  Will check ECG and troponin, CBC for anemia, metabolic panel for electrolyte abnormalities and dehydration, CXR to rule out PNA, PTX     2  Ear problem - Pt with fullness and tinnitus  States unable to open eustachian tubes  Will prescribe nasal steroid, provide number for ENT follow up         Amount and/or Complexity of Data Reviewed  Clinical lab tests: ordered and reviewed  Tests in the radiology section of CPT®: ordered and reviewed  Independent visualization of images, tracings, or specimens: yes    Patient Progress  Patient progress: stable      Disposition  Final diagnoses:   Chest pain   Eustachian tube dysfunction   Tinnitus of both ears     Time reflects when diagnosis was documented in both MDM as applicable and the Disposition within this note     Time User Action Codes Description Comment    7/12/2021  3:59 PM Melanie Jaxson CHERELLE Add [R07 9] Chest pain     7/12/2021  3:59 PM Pura Pickard, [address] [H69 80] Eustachian tube dysfunction     7/12/2021  4:00 PM Anthony Coil Add [H93 13] Tinnitus of both ears       ED Disposition     ED Disposition Condition Date/Time Comment    Discharge Stable Mon Jul 12, 2021  4:01 PM Corrine Olivo discharge to home/self care  Follow-up Information     Follow up With Specialties Details Why Contact Info Additional [address] [address]  [address] [facility]  [phone]       [facility]olaryng[facility]   [address]  [address] [facility] [id number] [place] [address],[address] [facility], [address] [place], [place] [address]          Discharge Medication List as of 7/12/2021  4:01 PM      START taking these medications    Details   !! fluticasone (FLONASE) 50 mcg/act nasal spray 1 spray into each nostril daily, Starting Mon 7/12/2021, Normal      !! naproxen (NAPROSYN) 500 mg tablet Take 1 tablet (500 mg total) by mouth 2 (two) times a day with meals for 10 days, Starting Mon 7/12/2021, Until u 7/22/2021, Normal       !! - Potential duplicate medications found  Please discuss with provider        CONTINUE these medications which have NOT CHANGED    Details   albuterol (ACCUNEB) 1 25 MG/3ML nebulizer solution Inhale 1 ampule every 6 (six) hours as needed, Historical Med      albuterol (Ventolin HFA) 90 mcg/act inhaler Inhale 2 puffs every 6 (six) hours as needed for wheezing, Starting Tue 4/6/2021, Normal      buPROPion (WELLBUTRIN XL) 150 mg 24 hr tablet Take 1 tablet (150 mg total) by mouth every morning, Starting Tue 6/15/2021, Normal      dicyclomine (BENTYL) 10 mg capsule take 1 capsule by mouth three times a day before meals, Normal      EPINEPHrine (EPIPEN 2-CUBA) 0 3 mg/0 3 mL SOAJ EpiPen 2-[place] 0 3 MG/0 3ML Injection Solution Auto-injector  Use as directed, prn allergic reaction   Quantity: 1;  Refills: 1      Quincy Ignacio DO ;  Start 31-July-2014  Active  2 Solution Auto-injector Pen, Historical Med      famotidine (PEPCID) 20 mg tablet Take 1 tablet (20 mg total) by mouth 2 (two) times a day, Starting Fri 4/23/2021, Normal      !! fluticasone (FLONASE) 50 mcg/act nasal spray 2 sprays into each nostril daily, Starting Fri 10/2/2020, Until Mon 7/12/2021, Normal      fluticasone (FLOVENT HFA) 110 MCG/ACT inhaler Inhale 2 puffs 2 (two) times a day Rinse mouth after use , Starting Tue 4/6/2021, Normal      hydrOXYzine HCL (ATARAX) 25 mg tablet Take 1 tablet (25 mg total) by mouth every 6 (six) hours as needed for anxiety, Starting Mon 6/14/2021, Normal      magnesium citrate (CITROMA) 1 745 g/30 mL oral solution Take 296 mL by mouth once for 1 dose, Starting Thu 12/31/2020, Normal      montelukast (SINGULAIR) 10 mg tablet Take 1 tablet (10 mg total) by mouth daily at bedtime, Starting Fri 3/5/2021, Normal      Na Sulfate-K Sulfate-Mg Sulf (Suprep Bowel Prep Kit) 17 5-3 13-1 6 GM/177ML SOLN Take 2 Bottles by mouth see administration instructions Please follow the instructions from the office, Starting Fri 10/30/2020, Normal      !! naproxen (NAPROSYN) 500 mg tablet Take 500 mg by mouth 2 (two) times a day as needed Take with food, Starting Thu 6/3/2021, Historical Med      ondansetron (ZOFRAN-ODT) 4 mg disintegrating tablet Take 1 tablet (4 mg total) by mouth every 6 (six) hours as needed for nausea or vomiting, Starting Wed 2/24/2021, Normal      polyethylene glycol (GOLYTELY) 4000 mL solution Take 4,000 mL by mouth once for 1 dose, Starting u 12/31/2020, Normal      valsartan (DIOVAN) 80 mg tablet take 1 tablet by mouth once daily, Normal       !! - Potential duplicate medications found  Please discuss with provider  No discharge procedures on file      PDMP Review     None          ED Provider  Electronically Signed by           Pramod Choi MD  07/13/21 8348

## 2021-07-12 NOTE — DISCHARGE INSTRUCTIONS
Use the Fluticasone, 1 spray in each nostril each morning and each night  Try to open your Eustachian tube every 1-2 hours, pinch your nose closed and try to blow out through your closed nose  Do this gently as too much pressure can result in a ruptured ear drum  The number for an Ear, Nose and Throat doctor is included in these discharge instructions, call to make an appointment for further evaluation  Take the Naprosyn twice daily for the next 5-10 days  Return to the ER if your chest pain changes in quality or location, or becomes associated with shortness of breath, lightheadedness, vomiting or sweating

## 2021-07-23 ENCOUNTER — OFFICE VISIT (OUTPATIENT)
Dept: FAMILY MEDICINE CLINIC | Facility: CLINIC | Age: 21
End: 2021-07-23
Payer: COMMERCIAL

## 2021-07-23 VITALS
OXYGEN SATURATION: 98 % | WEIGHT: 171.6 LBS | DIASTOLIC BLOOD PRESSURE: 72 MMHG | BODY MASS INDEX: 25.42 KG/M2 | TEMPERATURE: 98.4 F | SYSTOLIC BLOOD PRESSURE: 116 MMHG | HEART RATE: 65 BPM | HEIGHT: 69 IN

## 2021-07-23 DIAGNOSIS — F41.1 GENERALIZED ANXIETY DISORDER: Primary | ICD-10-CM

## 2021-07-23 PROCEDURE — 3008F BODY MASS INDEX DOCD: CPT | Performed by: FAMILY MEDICINE

## 2021-07-23 PROCEDURE — 99213 OFFICE O/P EST LOW 20 MIN: CPT | Performed by: FAMILY MEDICINE

## 2021-07-23 NOTE — PROGRESS NOTES
Assessment/Plan:  We discussed the possibility that current symptoms may be related to the medication but unlikely as he has only been on the medication for 2 weeks  He will continue to monitor  I recommended consideration for doing exercises to help flatten the pectoralis areas including bench presses or pushups  He will call if symptoms persist or worsen  1  Generalized anxiety disorder          Subjective:      Patient ID: Yanely Dwyer is a 23 y o  male  Patient has been on Wellbutrin for 2 weeks  He states that is working well for him but he is concerned that the left breast tissue seems a bit enlarged in a symmetrical compared to the right  Denies any tenderness  No chest pain or shortness of breath  The following portions of the patient's history were reviewed and updated as appropriate: allergies, current medications, past family history, past medical history, past social history, past surgical history, and problem list     Review of Systems      Objective:      /72 (BP Location: Left arm, Patient Position: Sitting, Cuff Size: Adult)   Pulse 65   Temp 98 4 °F (36 9 °C) (Temporal)   Ht 5' 9" (1 753 m)   Wt 77 8 kg (171 lb 9 6 oz)   SpO2 98%   BMI 25 34 kg/m²          Physical Exam  Vitals reviewed  Constitutional:       Appearance: He is well-developed  HENT:      Head: Normocephalic and atraumatic  Right Ear: External ear normal  Tympanic membrane is not erythematous or bulging  Left Ear: External ear normal  Tympanic membrane is not erythematous or bulging  Nose: Nose normal       Mouth/Throat:      Mouth: No oral lesions  Pharynx: No oropharyngeal exudate  Eyes:      General: No scleral icterus  Right eye: No discharge  Left eye: No discharge  Conjunctiva/sclera: Conjunctivae normal    Neck:      Thyroid: No thyromegaly  Cardiovascular:      Rate and Rhythm: Normal rate and regular rhythm        Heart sounds: Normal heart sounds  No murmur heard  No friction rub  No gallop  Pulmonary:      Effort: Pulmonary effort is normal  No respiratory distress  Breath sounds: No wheezing or rales  Chest:      Chest wall: No tenderness  Abdominal:      General: Bowel sounds are normal  There is no distension  Palpations: Abdomen is soft  There is no mass  Tenderness: There is no abdominal tenderness  There is no guarding or rebound  Musculoskeletal:         General: No tenderness or deformity  Normal range of motion  Cervical back: Normal range of motion and neck supple  Comments: Patient has some slightly fatty areas to the bilateral pectoralis areas of the chest but symmetrically so and not significant  No masses  No tenderness  No nipple discharge  Lymphadenopathy:      Cervical: No cervical adenopathy  Skin:     General: Skin is warm and dry  Coloration: Skin is not pale  Findings: No erythema or rash  Neurological:      Mental Status: He is alert and oriented to person, place, and time  Cranial Nerves: No cranial nerve deficit  Motor: No abnormal muscle tone  Coordination: Coordination normal       Deep Tendon Reflexes: Reflexes are normal and symmetric     Psychiatric:         Behavior: Behavior normal

## 2021-09-07 ENCOUNTER — TELEMEDICINE (OUTPATIENT)
Dept: FAMILY MEDICINE CLINIC | Facility: CLINIC | Age: 21
End: 2021-09-07
Payer: COMMERCIAL

## 2021-09-07 DIAGNOSIS — J40 BRONCHITIS: Primary | ICD-10-CM

## 2021-09-07 DIAGNOSIS — J45.909 MILD ASTHMA WITHOUT COMPLICATION, UNSPECIFIED WHETHER PERSISTENT: ICD-10-CM

## 2021-09-07 PROCEDURE — 99213 OFFICE O/P EST LOW 20 MIN: CPT | Performed by: FAMILY MEDICINE

## 2021-09-07 RX ORDER — PREDNISONE 10 MG/1
TABLET ORAL
Qty: 33 TABLET | Refills: 0 | Status: SHIPPED | OUTPATIENT
Start: 2021-09-07 | End: 2021-12-13

## 2021-09-07 RX ORDER — AZITHROMYCIN 250 MG/1
TABLET, FILM COATED ORAL
Qty: 6 TABLET | Refills: 0 | Status: SHIPPED | OUTPATIENT
Start: 2021-09-07 | End: 2021-09-14

## 2021-09-07 NOTE — PROGRESS NOTES
Virtual Regular Visit    Verification of patient location:    Patient is located in the following state in which I hold an active license [place]      Assessment/Plan:  Patient resides in the [facility] and is agreeable to getting COVID testing there  He will isolated home until COVID test results are returned  Recommend starting azithromycin and prednisone in the interim as he is already on inhalers for asthma  Consider chest x-ray  He will call with any worsening symptoms persisting symptoms or fevers  Problem List Items Addressed This Visit        Respiratory    Asthma      Other Visit Diagnoses     Bronchitis    -  Primary    Relevant Medications    azithromycin (ZITHROMAX) 250 mg tablet    predniSONE 10 mg tablet               Reason for visit is   Chief Complaint   Patient presents with    Virtual Regular Visit        Encounter provider Shaun Felix DO    Provider located at [address] [place] [address] [id number]      Recent Visits  No visits were found meeting these conditions  Showing recent visits within past 7 days and meeting all other requirements  Today's Visits  Date Type Provider Dept   09/07/21 Telemedicine Shaun Felix DO [facility]   Showing today's visits and meeting all other requirements  Future Appointments  No visits were found meeting these conditions  Showing future appointments within next 150 days and meeting all other requirements       The patient was identified by name and date of birth  Pamkamlesh Ismas was informed that this is a telemedicine visit and that the visit is being conducted through [address] [place] [address] and patient was informed that this is not a secure, HIPAA-compliant platform  He agrees to proceed     My office door was closed  No one else was in the room  He acknowledged consent and understanding of privacy and security of the video platform   The patient has agreed to participate and understands they can discontinue the visit at any time  Patient is aware this is a billable service  Artemio Hassan is a 23 y o  male for cough and congestion         Patient with history of asthma has dry nonproductive cough over the last 2 weeks  His infant son was recently diagnosed with pneumonia but was negative for RSV and COVID  Patient denies any fevers  No loss of taste or smell  No headache or GI complaints         Past Medical History:   Diagnosis Date    Asthma     Asthma due to seasonal allergies     GERD (gastroesophageal reflux disease)     Hypertension        Past Surgical History:   Procedure Laterality Date    COLONOSCOPY      NO PAST SURGERIES      UPPER GASTROINTESTINAL ENDOSCOPY      WISDOM TOOTH EXTRACTION         Current Outpatient Medications   Medication Sig Dispense Refill    albuterol (ACCUNEB) 1 25 MG/3ML nebulizer solution Inhale 1 ampule every 6 (six) hours as needed      albuterol (Ventolin HFA) 90 mcg/act inhaler Inhale 2 puffs every 6 (six) hours as needed for wheezing 2 Inhaler 0    azithromycin (ZITHROMAX) 250 mg tablet Take 2 tablets today then 1 tablet daily x 4 days 6 tablet 0    buPROPion (WELLBUTRIN XL) 150 mg 24 hr tablet Take 1 tablet (150 mg total) by mouth every morning 90 tablet 3    dicyclomine (BENTYL) 10 mg capsule take 1 capsule by mouth three times a day before meals 30 capsule 1    EPINEPHrine (EPIPEN 2-CUBA) 0 3 mg/0 3 mL SOAJ EpiPen 2-[place] 0 3 MG/0 3ML Injection Solution Auto-injector  Use as directed, prn allergic reaction   Quantity: 1;  Refills: 1      Quincy Ignacio DO ;  Start 31-July-2014  Active  2 Solution Auto-injector Pen      famotidine (PEPCID) 20 mg tablet Take 1 tablet (20 mg total) by mouth 2 (two) times a day 30 tablet 0    fluticasone (FLONASE) 50 mcg/act nasal spray 2 sprays into each nostril daily 16 g 0    fluticasone (FLONASE) 50 mcg/act nasal spray 1 spray into each nostril daily (Patient not taking: Reported on 7/23/2021) 16 g 0    fluticasone (FLOVENT HFA) 110 MCG/ACT inhaler Inhale 2 puffs 2 (two) times a day Rinse mouth after use  1 Inhaler 0    hydrOXYzine HCL (ATARAX) 25 mg tablet Take 1 tablet (25 mg total) by mouth every 6 (six) hours as needed for anxiety (Patient not taking: Reported on 6/15/2021) 12 tablet 0    magnesium citrate (CITROMA) 1 745 g/30 mL oral solution Take 296 mL by mouth once for 1 dose (Patient not taking: Reported on 7/23/2021) 296 mL 0    montelukast (SINGULAIR) 10 mg tablet Take 1 tablet (10 mg total) by mouth daily at bedtime 30 tablet 5    Na Sulfate-K Sulfate-Mg Sulf (Suprep Bowel Prep Kit) 17 5-3 13-1 6 GM/177ML SOLN Take 2 Bottles by mouth see administration instructions Please follow the instructions from the office (Patient not taking: Reported on 4/6/2021) 2 Bottle 0    naproxen (NAPROSYN) 500 mg tablet Take 500 mg by mouth 2 (two) times a day as needed Take with food      naproxen (NAPROSYN) 500 mg tablet Take 1 tablet (500 mg total) by mouth 2 (two) times a day with meals for 10 days 20 tablet 0    ondansetron (ZOFRAN-ODT) 4 mg disintegrating tablet Take 1 tablet (4 mg total) by mouth every 6 (six) hours as needed for nausea or vomiting 20 tablet 0    polyethylene glycol (GOLYTELY) 4000 mL solution Take 4,000 mL by mouth once for 1 dose (Patient not taking: Reported on 7/23/2021) 4000 mL 0    predniSONE 10 mg tablet 6 tablets daily, all at one time, with food  Then on day #4 decrease by 1 pill each day until finished  33 tablet 0    valsartan (DIOVAN) 80 mg tablet take 1 tablet by mouth once daily 90 tablet 1     No current facility-administered medications for this visit  Allergies   Allergen Reactions    Nuts - Food Allergy Anaphylaxis    Shellfish-Derived Products - Food Allergy Anaphylaxis    Other      Tree nuts  Yogurt  Carrots  Lymes    Peanut Oil - Food Allergy     Penicillins      Family allergy        Review of Systems   Constitutional: Negative    Negative for fatigue and fever  HENT: Negative  Negative for congestion  Eyes: Negative  Respiratory: Positive for cough  Negative for shortness of breath  Cardiovascular: Negative  Gastrointestinal: Negative  Endocrine: Negative  Genitourinary: Negative  Musculoskeletal: Negative  Skin: Negative  Allergic/Immunologic: Negative  Neurological: Negative  Hematological: Negative  Psychiatric/Behavioral: Negative  Video Exam    There were no vitals filed for this visit  Physical Exam  Constitutional:       General: He is not in acute distress  Appearance: He is well-developed  He is not diaphoretic  Neurological:      Mental Status: He is alert and oriented to person, place, and time  Psychiatric:         Behavior: Behavior normal          Thought Content: Thought content normal          Judgment: Judgment normal           I spent 15 minutes directly with the patient during this visit    [id number] Vazquez verbally agrees to participate in [employer]  Pt is aware that [employer] could be limited without vital signs or the ability to perform a full hands-on physical Trevor Canal understands he or the provider may request at any time to terminate the video visit and request the patient to seek care or treatment in person

## 2021-10-25 ENCOUNTER — OFFICE VISIT (OUTPATIENT)
Dept: FAMILY MEDICINE CLINIC | Facility: CLINIC | Age: 21
End: 2021-10-25
Payer: COMMERCIAL

## 2021-10-25 VITALS
SYSTOLIC BLOOD PRESSURE: 134 MMHG | DIASTOLIC BLOOD PRESSURE: 94 MMHG | HEART RATE: 88 BPM | WEIGHT: 107 LBS | TEMPERATURE: 97.6 F | HEIGHT: 70 IN | BODY MASS INDEX: 15.32 KG/M2

## 2021-10-25 DIAGNOSIS — R19.5 CHANGE IN STOOL: ICD-10-CM

## 2021-10-25 DIAGNOSIS — S29.019A THORACIC MYOFASCIAL STRAIN, INITIAL ENCOUNTER: Primary | ICD-10-CM

## 2021-10-25 PROCEDURE — 99214 OFFICE O/P EST MOD 30 MIN: CPT | Performed by: FAMILY MEDICINE

## 2021-10-25 RX ORDER — MELOXICAM 15 MG/1
15 TABLET ORAL DAILY
Qty: 30 TABLET | Refills: 0 | Status: SHIPPED | OUTPATIENT
Start: 2021-10-25 | End: 2021-12-13

## 2021-10-27 ENCOUNTER — TELEPHONE (OUTPATIENT)
Dept: FAMILY MEDICINE CLINIC | Facility: CLINIC | Age: 21
End: 2021-10-27

## 2021-12-05 DIAGNOSIS — I10 ESSENTIAL HYPERTENSION: ICD-10-CM

## 2021-12-05 RX ORDER — VALSARTAN 80 MG/1
TABLET ORAL
Qty: 90 TABLET | Refills: 1 | Status: SHIPPED | OUTPATIENT
Start: 2021-12-05 | End: 2022-05-19

## 2021-12-13 ENCOUNTER — OFFICE VISIT (OUTPATIENT)
Dept: GASTROENTEROLOGY | Facility: MEDICAL CENTER | Age: 21
End: 2021-12-13
Payer: COMMERCIAL

## 2021-12-13 VITALS
BODY MASS INDEX: 25.5 KG/M2 | WEIGHT: 175.2 LBS | TEMPERATURE: 97.1 F | SYSTOLIC BLOOD PRESSURE: 118 MMHG | HEART RATE: 73 BPM | DIASTOLIC BLOOD PRESSURE: 79 MMHG

## 2021-12-13 DIAGNOSIS — K21.9 GASTROESOPHAGEAL REFLUX DISEASE WITHOUT ESOPHAGITIS: Primary | ICD-10-CM

## 2021-12-13 DIAGNOSIS — R10.9 ABDOMINAL PAIN, UNSPECIFIED ABDOMINAL LOCATION: ICD-10-CM

## 2021-12-13 DIAGNOSIS — R19.5 CHANGE IN STOOL: ICD-10-CM

## 2021-12-13 PROCEDURE — 99214 OFFICE O/P EST MOD 30 MIN: CPT | Performed by: INTERNAL MEDICINE

## 2021-12-13 RX ORDER — OMEPRAZOLE 40 MG/1
40 CAPSULE, DELAYED RELEASE ORAL DAILY
Qty: 30 CAPSULE | Refills: 3 | Status: SHIPPED | OUTPATIENT
Start: 2021-12-13 | End: 2022-04-10

## 2021-12-18 ENCOUNTER — HOSPITAL ENCOUNTER (EMERGENCY)
Facility: HOSPITAL | Age: 21
Discharge: HOME | End: 2021-12-18
Attending: EMERGENCY MEDICINE | Admitting: EMERGENCY MEDICINE
Payer: COMMERCIAL

## 2021-12-18 VITALS
OXYGEN SATURATION: 98 % | RESPIRATION RATE: 16 BRPM | DIASTOLIC BLOOD PRESSURE: 86 MMHG | TEMPERATURE: 98.1 F | SYSTOLIC BLOOD PRESSURE: 139 MMHG | HEART RATE: 94 BPM

## 2021-12-18 DIAGNOSIS — N64.4 BREAST PAIN: Primary | ICD-10-CM

## 2021-12-18 PROCEDURE — 99283 EMERGENCY DEPT VISIT LOW MDM: CPT

## 2021-12-18 PROCEDURE — 99284 EMERGENCY DEPT VISIT MOD MDM: CPT | Performed by: EMERGENCY MEDICINE

## 2022-01-11 ENCOUNTER — TELEPHONE (OUTPATIENT)
Dept: OTHER | Facility: OTHER | Age: 22
End: 2022-01-11

## 2022-01-11 NOTE — TELEPHONE ENCOUNTER
Pt called in stating he needs a prescription for a breast center visit as a part of his follow up to being in the ER  He is requesting a call back from the office

## 2022-01-18 NOTE — TELEPHONE ENCOUNTER
S/W pt, he is in agreement to be evaluated in the office  Pt asked if he can call the office at a later time today to schedule an evaluation with Dr Yanely Henriquez   Will schedule once calls back

## 2022-01-19 ENCOUNTER — OFFICE VISIT (OUTPATIENT)
Dept: FAMILY MEDICINE CLINIC | Facility: CLINIC | Age: 22
End: 2022-01-19
Payer: COMMERCIAL

## 2022-01-19 ENCOUNTER — APPOINTMENT (OUTPATIENT)
Dept: RADIOLOGY | Facility: MEDICAL CENTER | Age: 22
End: 2022-01-19
Payer: COMMERCIAL

## 2022-01-19 VITALS
HEIGHT: 69 IN | WEIGHT: 182.2 LBS | BODY MASS INDEX: 26.98 KG/M2 | RESPIRATION RATE: 14 BRPM | SYSTOLIC BLOOD PRESSURE: 138 MMHG | TEMPERATURE: 97.7 F | OXYGEN SATURATION: 86 % | DIASTOLIC BLOOD PRESSURE: 80 MMHG | HEART RATE: 98 BPM

## 2022-01-19 DIAGNOSIS — F41.1 GENERALIZED ANXIETY DISORDER: ICD-10-CM

## 2022-01-19 DIAGNOSIS — R07.89 ATYPICAL CHEST PAIN: ICD-10-CM

## 2022-01-19 DIAGNOSIS — R07.89 ATYPICAL CHEST PAIN: Primary | ICD-10-CM

## 2022-01-19 PROCEDURE — 99214 OFFICE O/P EST MOD 30 MIN: CPT | Performed by: FAMILY MEDICINE

## 2022-01-19 PROCEDURE — 71046 X-RAY EXAM CHEST 2 VIEWS: CPT

## 2022-01-19 PROCEDURE — 93000 ELECTROCARDIOGRAM COMPLETE: CPT | Performed by: FAMILY MEDICINE

## 2022-01-19 NOTE — PROGRESS NOTES
Assessment/Plan:  EKG today is normal   Recommend chest x-ray  Consider GI evaluation if symptoms persist   He will call with any new persisting or worsening symptoms  He does have history of anxiety and this may be anxiety related as well or may be muscular  Consider physical therapy  Time spent counseling reviewing treatment plan and coordinating care as well as documentation was 30 minutes  1  Atypical chest pain  -     POCT ECG  -     XR chest pa & lateral; Future; Expected date: 01/19/2022    2  Generalized anxiety disorder          Subjective:      Patient ID: Hugo Mcclure is a 19 y o  male  Patient with history of anxiety has had atypical left-sided chest pain for the last 3 months intermittently  No chest pain with exertion  No chest pain at night  He states symptoms come and go randomly without any shortness of breath or palpitations  Denies any heartburn symptoms currently  He does take a proton pump inhibitor regularly  The following portions of the patient's history were reviewed and updated as appropriate: allergies, current medications, past family history, past medical history, past social history, past surgical history, and problem list     Review of Systems   Constitutional: Negative  HENT: Negative  Eyes: Negative  Respiratory: Negative  Cardiovascular: Positive for chest pain (As noted in HPI)  Gastrointestinal: Negative  Endocrine: Negative  Genitourinary: Negative  Musculoskeletal: Negative  Skin: Negative  Allergic/Immunologic: Negative  Neurological: Negative  Hematological: Negative  Psychiatric/Behavioral: Negative  Objective:      /80 (BP Location: Left arm, Patient Position: Sitting, Cuff Size: Standard)   Pulse 98   Temp 97 7 °F (36 5 °C) (Temporal)   Resp 14   Ht 5' 9" (1 753 m)   Wt 82 6 kg (182 lb 3 2 oz)   SpO2 (!) 86%   BMI 26 91 kg/m²          Physical Exam  Vitals reviewed     Constitutional: Appearance: He is well-developed  HENT:      Head: Normocephalic and atraumatic  Right Ear: External ear normal  Tympanic membrane is not erythematous or bulging  Left Ear: External ear normal  Tympanic membrane is not erythematous or bulging  Nose: Nose normal       Mouth/Throat:      Mouth: No oral lesions  Pharynx: No oropharyngeal exudate  Eyes:      General: No scleral icterus  Right eye: No discharge  Left eye: No discharge  Conjunctiva/sclera: Conjunctivae normal    Neck:      Thyroid: No thyromegaly  Cardiovascular:      Rate and Rhythm: Normal rate and regular rhythm  Heart sounds: Normal heart sounds  No murmur heard  No friction rub  No gallop  Pulmonary:      Effort: Pulmonary effort is normal  No respiratory distress  Breath sounds: No wheezing or rales  Chest:      Chest wall: No tenderness  Abdominal:      General: Bowel sounds are normal  There is no distension  Palpations: Abdomen is soft  There is no mass  Tenderness: There is no abdominal tenderness  There is no guarding or rebound  Musculoskeletal:         General: No tenderness or deformity  Normal range of motion  Cervical back: Normal range of motion and neck supple  Lymphadenopathy:      Cervical: No cervical adenopathy  Skin:     General: Skin is warm and dry  Coloration: Skin is not pale  Findings: No erythema or rash  Neurological:      Mental Status: He is alert and oriented to person, place, and time  Cranial Nerves: No cranial nerve deficit  Motor: No abnormal muscle tone  Coordination: Coordination normal       Deep Tendon Reflexes: Reflexes are normal and symmetric     Psychiatric:         Behavior: Behavior normal

## 2022-01-21 ENCOUNTER — TELEPHONE (OUTPATIENT)
Dept: FAMILY MEDICINE CLINIC | Facility: CLINIC | Age: 22
End: 2022-01-21

## 2022-01-21 NOTE — TELEPHONE ENCOUNTER
Pt also queried about medication that was supposed to be sent to pharmacy for phlegm  No medication in medication list seen of in AVS/office notes

## 2022-01-24 DIAGNOSIS — R09.81 SINUS CONGESTION: Primary | ICD-10-CM

## 2022-01-24 RX ORDER — AZELASTINE 1 MG/ML
2 SPRAY, METERED NASAL 2 TIMES DAILY
Qty: 30 ML | Refills: 1 | Status: SHIPPED | OUTPATIENT
Start: 2022-01-24 | End: 2022-03-21

## 2022-01-24 RX ORDER — AZELASTINE 1 MG/ML
2 SPRAY, METERED NASAL 2 TIMES DAILY
Qty: 30 ML | Refills: 1 | Status: SHIPPED | OUTPATIENT
Start: 2022-01-24 | End: 2022-01-24 | Stop reason: SDUPTHER

## 2022-03-21 DIAGNOSIS — R09.81 SINUS CONGESTION: ICD-10-CM

## 2022-03-21 RX ORDER — AZELASTINE HYDROCHLORIDE 137 UG/1
SPRAY, METERED NASAL
Qty: 30 ML | Refills: 1 | Status: SHIPPED | OUTPATIENT
Start: 2022-03-21 | End: 2022-05-18

## 2022-04-10 DIAGNOSIS — K21.9 GASTROESOPHAGEAL REFLUX DISEASE WITHOUT ESOPHAGITIS: ICD-10-CM

## 2022-04-10 RX ORDER — OMEPRAZOLE 40 MG/1
CAPSULE, DELAYED RELEASE ORAL
Qty: 30 CAPSULE | Refills: 3 | Status: SHIPPED | OUTPATIENT
Start: 2022-04-10 | End: 2022-08-10

## 2022-05-18 DIAGNOSIS — R09.81 SINUS CONGESTION: ICD-10-CM

## 2022-05-18 RX ORDER — AZELASTINE HYDROCHLORIDE 137 UG/1
SPRAY, METERED NASAL
Qty: 30 ML | Refills: 1 | Status: SHIPPED | OUTPATIENT
Start: 2022-05-18 | End: 2022-07-27

## 2022-05-19 DIAGNOSIS — F33.1 MODERATE EPISODE OF RECURRENT MAJOR DEPRESSIVE DISORDER (HCC): ICD-10-CM

## 2022-05-19 DIAGNOSIS — I10 ESSENTIAL HYPERTENSION: ICD-10-CM

## 2022-05-19 DIAGNOSIS — F41.1 GENERALIZED ANXIETY DISORDER: ICD-10-CM

## 2022-05-19 RX ORDER — BUPROPION HYDROCHLORIDE 150 MG/1
TABLET ORAL
Qty: 90 TABLET | Refills: 3 | Status: SHIPPED | OUTPATIENT
Start: 2022-05-19

## 2022-05-19 RX ORDER — VALSARTAN 80 MG/1
TABLET ORAL
Qty: 90 TABLET | Refills: 1 | Status: SHIPPED | OUTPATIENT
Start: 2022-05-19

## 2022-07-27 DIAGNOSIS — R09.81 SINUS CONGESTION: ICD-10-CM

## 2022-07-27 RX ORDER — AZELASTINE 1 MG/ML
SPRAY, METERED NASAL
Qty: 30 ML | Refills: 1 | Status: SHIPPED | OUTPATIENT
Start: 2022-07-27

## 2022-08-07 ENCOUNTER — HOSPITAL ENCOUNTER (EMERGENCY)
Facility: HOSPITAL | Age: 22
Discharge: HOME | End: 2022-08-07
Attending: EMERGENCY MEDICINE
Payer: COMMERCIAL

## 2022-08-07 ENCOUNTER — OFFICE VISIT (OUTPATIENT)
Dept: URGENT CARE | Facility: MEDICAL CENTER | Age: 22
End: 2022-08-07
Payer: COMMERCIAL

## 2022-08-07 ENCOUNTER — APPOINTMENT (OUTPATIENT)
Dept: RADIOLOGY | Facility: MEDICAL CENTER | Age: 22
End: 2022-08-07
Payer: COMMERCIAL

## 2022-08-07 VITALS
TEMPERATURE: 98 F | RESPIRATION RATE: 16 BRPM | SYSTOLIC BLOOD PRESSURE: 131 MMHG | WEIGHT: 196.65 LBS | BODY MASS INDEX: 29.04 KG/M2 | DIASTOLIC BLOOD PRESSURE: 71 MMHG | OXYGEN SATURATION: 98 % | HEART RATE: 83 BPM

## 2022-08-07 VITALS
WEIGHT: 182 LBS | BODY MASS INDEX: 26.88 KG/M2 | SYSTOLIC BLOOD PRESSURE: 154 MMHG | TEMPERATURE: 98 F | OXYGEN SATURATION: 99 % | HEART RATE: 82 BPM | RESPIRATION RATE: 20 BRPM | DIASTOLIC BLOOD PRESSURE: 94 MMHG

## 2022-08-07 DIAGNOSIS — R42 DIZZINESS AND GIDDINESS: ICD-10-CM

## 2022-08-07 DIAGNOSIS — R42 DIZZINESS: Primary | ICD-10-CM

## 2022-08-07 DIAGNOSIS — R07.89 BURNING IN THE CHEST: Primary | ICD-10-CM

## 2022-08-07 DIAGNOSIS — R05.1 ACUTE COUGH: ICD-10-CM

## 2022-08-07 LAB
ALBUMIN SERPL BCP-MCNC: 4.1 G/DL (ref 3.5–5)
ALP SERPL-CCNC: 53 U/L (ref 46–116)
ALT SERPL W P-5'-P-CCNC: 56 U/L (ref 12–78)
ANION GAP SERPL CALCULATED.3IONS-SCNC: 8 MMOL/L (ref 4–13)
AST SERPL W P-5'-P-CCNC: 26 U/L (ref 5–45)
ATRIAL RATE: 72 BPM
BASOPHILS # BLD AUTO: 0.05 THOUSANDS/ΜL (ref 0–0.1)
BASOPHILS NFR BLD AUTO: 1 % (ref 0–1)
BILIRUB SERPL-MCNC: 0.6 MG/DL (ref 0.2–1)
BILIRUB UR QL STRIP: NEGATIVE
BUN SERPL-MCNC: 12 MG/DL (ref 5–25)
CALCIUM SERPL-MCNC: 8.7 MG/DL (ref 8.3–10.1)
CARDIAC TROPONIN I PNL SERPL HS: <2 NG/L
CHLORIDE SERPL-SCNC: 103 MMOL/L (ref 96–108)
CLARITY UR: CLEAR
CO2 SERPL-SCNC: 26 MMOL/L (ref 21–32)
COLOR UR: YELLOW
CREAT SERPL-MCNC: 0.79 MG/DL (ref 0.6–1.3)
EOSINOPHIL # BLD AUTO: 0.22 THOUSAND/ΜL (ref 0–0.61)
EOSINOPHIL NFR BLD AUTO: 2 % (ref 0–6)
ERYTHROCYTE [DISTWIDTH] IN BLOOD BY AUTOMATED COUNT: 11.9 % (ref 11.6–15.1)
GFR SERPL CREATININE-BSD FRML MDRD: 126 ML/MIN/1.73SQ M
GLUCOSE SERPL-MCNC: 85 MG/DL (ref 65–140)
GLUCOSE UR STRIP-MCNC: NEGATIVE MG/DL
HCT VFR BLD AUTO: 43 % (ref 36.5–49.3)
HGB BLD-MCNC: 14.5 G/DL (ref 12–17)
HGB UR QL STRIP.AUTO: NEGATIVE
IMM GRANULOCYTES # BLD AUTO: 0.03 THOUSAND/UL (ref 0–0.2)
IMM GRANULOCYTES NFR BLD AUTO: 0 % (ref 0–2)
KETONES UR STRIP-MCNC: NEGATIVE MG/DL
LEUKOCYTE ESTERASE UR QL STRIP: NEGATIVE
LYMPHOCYTES # BLD AUTO: 2.55 THOUSANDS/ΜL (ref 0.6–4.47)
LYMPHOCYTES NFR BLD AUTO: 24 % (ref 14–44)
MAGNESIUM SERPL-MCNC: 2 MG/DL (ref 1.6–2.6)
MCH RBC QN AUTO: 30 PG (ref 26.8–34.3)
MCHC RBC AUTO-ENTMCNC: 33.7 G/DL (ref 31.4–37.4)
MCV RBC AUTO: 89 FL (ref 82–98)
MONOCYTES # BLD AUTO: 1.02 THOUSAND/ΜL (ref 0.17–1.22)
MONOCYTES NFR BLD AUTO: 10 % (ref 4–12)
NEUTROPHILS # BLD AUTO: 6.64 THOUSANDS/ΜL (ref 1.85–7.62)
NEUTS SEG NFR BLD AUTO: 63 % (ref 43–75)
NITRITE UR QL STRIP: NEGATIVE
NRBC BLD AUTO-RTO: 0 /100 WBCS
P AXIS: 10 DEGREES
PH UR STRIP.AUTO: 7.5 [PH] (ref 4.5–8)
PHOSPHATE SERPL-MCNC: 3 MG/DL (ref 2.7–4.5)
PLATELET # BLD AUTO: 304 THOUSANDS/UL (ref 149–390)
PMV BLD AUTO: 8.8 FL (ref 8.9–12.7)
POTASSIUM SERPL-SCNC: 3.6 MMOL/L (ref 3.5–5.3)
PR INTERVAL: 148 MS
PROT SERPL-MCNC: 7.5 G/DL (ref 6.4–8.4)
PROT UR STRIP-MCNC: NEGATIVE MG/DL
QRS AXIS: 15 DEGREES
QRSD INTERVAL: 108 MS
QT INTERVAL: 370 MS
QTC INTERVAL: 405 MS
RBC # BLD AUTO: 4.83 MILLION/UL (ref 3.88–5.62)
SARS-COV-2 AG UPPER RESP QL IA: NEGATIVE
SODIUM SERPL-SCNC: 137 MMOL/L (ref 135–147)
SP GR UR STRIP.AUTO: 1.01 (ref 1–1.03)
T WAVE AXIS: 34 DEGREES
TSH SERPL DL<=0.05 MIU/L-ACNC: 1.75 UIU/ML (ref 0.45–4.5)
UROBILINOGEN UR QL STRIP.AUTO: 0.2 E.U./DL
VALID CONTROL: NORMAL
VENTRICULAR RATE: 72 BPM
WBC # BLD AUTO: 10.51 THOUSAND/UL (ref 4.31–10.16)

## 2022-08-07 PROCEDURE — 71046 X-RAY EXAM CHEST 2 VIEWS: CPT

## 2022-08-07 PROCEDURE — 81003 URINALYSIS AUTO W/O SCOPE: CPT

## 2022-08-07 PROCEDURE — 96365 THER/PROPH/DIAG IV INF INIT: CPT

## 2022-08-07 PROCEDURE — 83735 ASSAY OF MAGNESIUM: CPT | Performed by: EMERGENCY MEDICINE

## 2022-08-07 PROCEDURE — 84443 ASSAY THYROID STIM HORMONE: CPT | Performed by: EMERGENCY MEDICINE

## 2022-08-07 PROCEDURE — 93010 ELECTROCARDIOGRAM REPORT: CPT | Performed by: INTERNAL MEDICINE

## 2022-08-07 PROCEDURE — 84484 ASSAY OF TROPONIN QUANT: CPT | Performed by: EMERGENCY MEDICINE

## 2022-08-07 PROCEDURE — 80053 COMPREHEN METABOLIC PANEL: CPT | Performed by: EMERGENCY MEDICINE

## 2022-08-07 PROCEDURE — 99213 OFFICE O/P EST LOW 20 MIN: CPT | Performed by: PHYSICIAN ASSISTANT

## 2022-08-07 PROCEDURE — 36415 COLL VENOUS BLD VENIPUNCTURE: CPT | Performed by: EMERGENCY MEDICINE

## 2022-08-07 PROCEDURE — 85025 COMPLETE CBC W/AUTO DIFF WBC: CPT | Performed by: EMERGENCY MEDICINE

## 2022-08-07 PROCEDURE — 84100 ASSAY OF PHOSPHORUS: CPT | Performed by: EMERGENCY MEDICINE

## 2022-08-07 PROCEDURE — 87811 SARS-COV-2 COVID19 W/OPTIC: CPT | Performed by: PHYSICIAN ASSISTANT

## 2022-08-07 PROCEDURE — 99284 EMERGENCY DEPT VISIT MOD MDM: CPT

## 2022-08-07 PROCEDURE — 99285 EMERGENCY DEPT VISIT HI MDM: CPT | Performed by: EMERGENCY MEDICINE

## 2022-08-07 PROCEDURE — 93005 ELECTROCARDIOGRAM TRACING: CPT | Performed by: PHYSICIAN ASSISTANT

## 2022-08-07 RX ORDER — MECLIZINE HYDROCHLORIDE 25 MG/1
25 TABLET ORAL 3 TIMES DAILY PRN
Qty: 30 TABLET | Refills: 0 | Status: SHIPPED | OUTPATIENT
Start: 2022-08-07

## 2022-08-07 RX ORDER — MECLIZINE HYDROCHLORIDE 25 MG/1
25 TABLET ORAL ONCE
Status: COMPLETED | OUTPATIENT
Start: 2022-08-07 | End: 2022-08-07

## 2022-08-07 RX ADMIN — SODIUM CHLORIDE, SODIUM LACTATE, POTASSIUM CHLORIDE, AND CALCIUM CHLORIDE 1000 ML: .6; .31; .03; .02 INJECTION, SOLUTION INTRAVENOUS at 19:00

## 2022-08-07 RX ADMIN — MECLIZINE HYDROCHLORIDE 25 MG: 25 TABLET ORAL at 19:03

## 2022-08-07 NOTE — ED PROVIDER NOTES
Pt Name: Anthony Galindo  MRN: [id number]  [place] 2000  Age/Sex: 19 y o  male  Date of evaluation: 2022  PCP: Shaina Paul, [address] [place] [address]    Chief Complaint   Patient presents with    Dizziness     Pt seen at care now for dizziness  Pt had EKG which was abnormal  Sent to ED for further eval         HPI    Jair Babin presents to the Emergency Department complaining of dizziness  He was seen at urgent care and sent here for "abnormal EKG"  He was tested for covid as he also has complained of cough as well  He has had a sore throat but no fever and no difficulty swallowing  HPI      Past Medical and Surgical History    Past Medical History:   Diagnosis Date    Asthma     Asthma due to seasonal allergies     GERD (gastroesophageal reflux disease)     Hypertension        Past Surgical History:   Procedure Laterality Date    COLONOSCOPY      NO PAST SURGERIES      UPPER GASTROINTESTINAL ENDOSCOPY      WISDOM TOOTH EXTRACTION         History reviewed  No pertinent family history  Social History     Tobacco Use    Smoking status: Former Smoker     Types: Cigarettes     Quit date: 2019     Years since quittin 6    Smokeless tobacco: Never Used    Tobacco comment: vapes   Vaping Use    Vaping Use: Former    Start date: 2014   Substance Use Topics    Alcohol use: Not Currently    Drug use: No              Allergies    Allergies   Allergen Reactions    Nuts - Food Allergy Anaphylaxis    Shellfish-Derived Products - Food Allergy Anaphylaxis    Other      Tree nuts  Yogurt  Carrots  Lymes    Peanut Oil - Food Allergy     Penicillins      Family allergy        Home Medications    Prior to Admission medications    Medication Sig Start Date End Date Taking?  Authorizing Provider   albuterol (ACCUNEB) 1 25 MG/3ML nebulizer solution Inhale 1 ampule every 6 (six) hours as needed    Historical Provider, MD   albuterol (Ventolin HFA) 90 mcg/act inhaler Inhale 2 puffs every 6 (six) hours as needed for wheezing 4/6/21   Benita Mccarthy, DO   azelastine (ASTELIN) 0 1 % nasal spray instill 2 sprays into each nostril twice a day as directed  Patient not taking: Reported on 8/7/2022 7/27/22   Benita Mccarthy DO   buPROPion (WELLBUTRIN XL) 150 mg 24 hr tablet take 1 tablet by mouth every morning 5/19/22   Benita Mccarthy, DO   Diclofenac Sodium (VOLTAREN) 1 % Apply 2 g topically 4 (four) times a day  Patient not taking: Reported on 1/19/2022 12/18/21   Jacquelyn Chakraborty MD   EPINEPHrine (EPIPEN 2-JAYME) 0 3 mg/0 3 mL SOAJ EpiPen 2-Jayme 0 3 MG/0 3ML Injection Solution Auto-injector  Use as directed, prn allergic reaction   Quantity: 1;  Refills: 1      Quincy Ignacio DO ;  Start 31-July-2014  Active  2 Solution Auto-injector Pen 7/31/14   Historical Provider, MD   famotidine (PEPCID) 20 mg tablet Take 1 tablet (20 mg total) by mouth 2 (two) times a day 4/23/21   Lucy Santoyo PA-C   fluticasone (FLONASE) 50 mcg/act nasal spray 1 spray into each nostril daily  Patient not taking: Reported on 7/23/2021 7/12/21   Ryan Boo MD   fluticasone (FLOVENT HFA) 110 MCG/ACT inhaler Inhale 2 puffs 2 (two) times a day Rinse mouth after use  Patient not taking: Reported on 8/7/2022 4/6/21   Benita Mccarthy DO   montelukast (SINGULAIR) 10 mg tablet Take 1 tablet (10 mg total) by mouth daily at bedtime 3/5/21   Benita Mccarthy, DO   omeprazole (PriLOSEC) 40 MG capsule take 1 capsule by mouth once daily 4/10/22   Kun Ramos MD   valsartan (DIOVAN) 80 mg tablet take 1 tablet by mouth once daily 5/19/22   Benita Mccarthy, DO           Review of Systems    Review of Systems   Constitutional: Positive for fatigue  Negative for chills and fever  HENT: Negative for ear pain and sore throat  Eyes: Negative for pain and visual disturbance  Respiratory: Positive for cough  Negative for shortness of breath  Cardiovascular: Negative for chest pain and palpitations     Gastrointestinal: Negative for abdominal pain and vomiting  Genitourinary: Negative for dysuria and hematuria  Musculoskeletal: Negative for arthralgias and back pain  Skin: Negative for color change and rash  Neurological: Positive for dizziness, weakness and light-headedness  Negative for seizures and syncope  All other systems reviewed and are negative  Physical Exam      ED Triage Vitals [08/07/22 1814]   Temperature Pulse Respirations Blood Pressure SpO2   98 °F (36 7 °C) 84 17 (!) 150/103 99 %      Temp Source Heart Rate Source Patient Position - Orthostatic VS BP Location FiO2 (%)   Oral Monitor Sitting Right arm --      Pain Score       --               Physical Exam  Vitals and nursing note reviewed  Constitutional:       General: He is not in acute distress  Appearance: He is well-developed  He is not diaphoretic  HENT:      Head: Normocephalic and atraumatic  Nose: Nose normal    Eyes:      Conjunctiva/sclera: Conjunctivae normal       Pupils: Pupils are equal, round, and reactive to light  Cardiovascular:      Rate and Rhythm: Normal rate and regular rhythm  Heart sounds: Normal heart sounds  No murmur heard  No friction rub  No gallop  Pulmonary:      Effort: Pulmonary effort is normal  No respiratory distress  Breath sounds: Normal breath sounds  No wheezing or rales  Abdominal:      General: Bowel sounds are normal       Palpations: Abdomen is soft  Tenderness: There is no abdominal tenderness  There is no guarding or rebound  Musculoskeletal:         General: Normal range of motion  Cervical back: Normal range of motion and neck supple  Skin:     General: Skin is warm and dry  Neurological:      Mental Status: He is alert and oriented to person, place, and time  Psychiatric:         Behavior: Behavior normal                 Assessment and Plan    Leny Monreal is a 19 y o  male who presents with viral syndrome and dizziness  Physical examination unremarkable   Plan will be to perform diagnostic testing and treat symptomatically  MDM        EKG INTERPRETATION  EKG Interpretation      EKG interpreted by me  Interpretation by Marybeth Calderon DO  EKG reviewed and interpreted independently      Labs:    Results for orders placed or performed during the hospital encounter of 08/07/22   CBC and differential   Result Value Ref Range    WBC 10 51 (H) 4 31 - 10 16 Thousand/uL    RBC 4 83 3 88 - 5 62 Million/uL    Hemoglobin 14 5 12 0 - 17 0 g/dL    Hematocrit 43 0 36 5 - 49 3 %    MCV 89 82 - 98 fL    MCH 30 0 26 8 - 34 3 pg    MCHC 33 7 31 4 - 37 4 g/dL    RDW 11 9 11 6 - 15 1 %    MPV 8 8 (L) 8 9 - 12 7 fL    Platelets 369 673 - 538 Thousands/uL    nRBC 0 /100 WBCs    Neutrophils Relative 63 43 - 75 %    Immat GRANS % 0 0 - 2 %    Lymphocytes Relative 24 14 - 44 %    Monocytes Relative 10 4 - 12 %    Eosinophils Relative 2 0 - 6 %    Basophils Relative 1 0 - 1 %    Neutrophils Absolute 6 64 1 85 - 7 62 Thousands/µL    Immature Grans Absolute 0 03 0 00 - 0 20 Thousand/uL    Lymphocytes Absolute 2 55 0 60 - 4 47 Thousands/µL    Monocytes Absolute 1 02 0 17 - 1 22 Thousand/µL    Eosinophils Absolute 0 22 0 00 - 0 61 Thousand/µL    Basophils Absolute 0 05 0 00 - 0 10 Thousands/µL   Comprehensive metabolic panel   Result Value Ref Range    Sodium 137 135 - 147 mmol/L    Potassium 3 6 3 5 - 5 3 mmol/L    Chloride 103 96 - 108 mmol/L    CO2 26 21 - 32 mmol/L    ANION GAP 8 4 - 13 mmol/L    BUN 12 5 - 25 mg/dL    Creatinine 0 79 0 60 - 1 30 mg/dL    Glucose 85 65 - 140 mg/dL    Calcium 8 7 8 3 - 10 1 mg/dL    AST 26 5 - 45 U/L    ALT 56 12 - 78 U/L    Alkaline Phosphatase 53 46 - 116 U/L    Total Protein 7 5 6 4 - 8 4 g/dL    Albumin 4 1 3 5 - 5 0 g/dL    Total Bilirubin 0 60 0 20 - 1 00 mg/dL    eGFR 126 ml/min/1 73sq m   HS Troponin 0hr (reflex protocol)   Result Value Ref Range    hs TnI 0hr <2 "Refer to ACS Flowchart"- see link ng/L   Magnesium   Result Value Ref Range    Magnesium 2 0 1 6 - 2 6 mg/dL   Phosphorus   Result Value Ref Range    Phosphorus 3 0 2 7 - 4 5 mg/dL   TSH   Result Value Ref Range    TSH 3RD GENERATON 1 747 0 450 - 4 500 uIU/mL   Urine Macroscopic, POC   Result Value Ref Range    Color, UA Yellow     Clarity, UA Clear     pH, UA 7 5 4 5 - 8 0    Leukocytes, UA Negative Negative    Nitrite, UA Negative Negative    Protein, UA Negative Negative mg/dl    Glucose, UA Negative Negative mg/dl    Ketones, UA Negative Negative mg/dl    Urobilinogen, UA 0 2 0 2, 1 0 E U /dl E U /dl    Bilirubin, UA Negative Negative    Occult Blood, UA Negative Negative    Specific Gravity, UA 1 015 1 003 - 1 030       All labs reviewed and utilized in the medical decision making process    Radiology:    No orders to display       All radiology studies independently viewed by me and interpreted by the radiologist     Procedure    Procedures      ED Course of Care and Re-Assessments      Medications   lactated ringers bolus 1,000 mL (0 mL Intravenous Stopped 8/7/22 2006)   meclizine (ANTIVERT) tablet 25 mg (25 mg Oral Given 8/7/22 1903)           FINAL IMPRESSION    Final diagnoses:   Dizziness         DISPOSITION/PLAN      Time reflects when diagnosis was documented in both MDM as applicable and the Disposition within this note     Time User Action Codes Description Comment    8/7/2022  7:58 PM Chantelle Mejia Add [R42] Dizziness       ED Disposition     ED Disposition   Discharge    Condition   Stable    Date/Time   Sun Aug 7, 2022  7:58 PM    Shmuel Funez discharge to home/self care                 Follow-up Information     Follow up With Specialties Details Why Contact Info    Colin Kinsey DO Family Medicine Schedule an appointment as soon as possible for a visit   [address]  [phone]              PATIENT REFERRED TO:    Colin Kinsey, [address] [facility]  [address]  [phone]    Schedule an appointment as soon as possible for a visit DISCHARGE MEDICATIONS:    Discharge Medication List as of 8/7/2022  7:59 PM      START taking these medications    Details   meclizine (ANTIVERT) 25 mg tablet Take 1 tablet (25 mg total) by mouth 3 (three) times a day as needed for dizziness, Starting Sun 8/7/2022, Normal         CONTINUE these medications which have NOT CHANGED    Details   albuterol (ACCUNEB) 1 25 MG/3ML nebulizer solution Inhale 1 ampule every 6 (six) hours as needed, Historical Med      albuterol (Ventolin HFA) 90 mcg/act inhaler Inhale 2 puffs every 6 (six) hours as needed for wheezing, Starting Tue 4/6/2021, Normal      azelastine (ASTELIN) 0 1 % nasal spray instill 2 sprays into each nostril twice a day as directed, Normal      buPROPion (WELLBUTRIN XL) 150 mg 24 hr tablet take 1 tablet by mouth every morning, Normal      Diclofenac Sodium (VOLTAREN) 1 % Apply 2 g topically 4 (four) times a day, Starting Sat 12/18/2021, Normal      EPINEPHrine (EPIPEN) 0 3 mg/0 3 mL SOAJ EpiPen 2-Jayme 0 3 MG/0 3ML Injection Solution Auto-injector  Use as directed, prn allergic reaction   Quantity: 1;  Refills: 1      Quincy Ignacio DO ;  Start 31-July-2014  Active  2 Solution Auto-injector Pen, Historical Med      famotidine (PEPCID) 20 mg tablet Take 1 tablet (20 mg total) by mouth 2 (two) times a day, Starting Fri 4/23/2021, Normal      fluticasone (FLONASE) 50 mcg/act nasal spray 1 spray into each nostril daily, Starting Mon 7/12/2021, Normal      fluticasone (FLOVENT HFA) 110 MCG/ACT inhaler Inhale 2 puffs 2 (two) times a day Rinse mouth after use , Starting Tue 4/6/2021, Normal      montelukast (SINGULAIR) 10 mg tablet Take 1 tablet (10 mg total) by mouth daily at bedtime, Starting Fri 3/5/2021, Normal      omeprazole (PriLOSEC) 40 MG capsule take 1 capsule by mouth once daily, Normal      valsartan (DIOVAN) 80 mg tablet take 1 tablet by mouth once daily, Normal             No discharge procedures on file           Kristian Bowling,  Shama Cowan DO  08/08/22 0017

## 2022-08-07 NOTE — PATIENT INSTRUCTIONS
Patient was educated on acute cough  Patient was educated on taking OTC allergy medications  Patient was offered Rapid strep but declined  Patient was educated on hydration  If symptoms persist follow up with PCP  Patient was told any chest pain, shortness of breath or high grade fevers go to ED  Patient was told based on EKG finding I recommend she go to ED for further evaluation  Acute Cough   WHAT YOU NEED TO KNOW:   An acute cough can last up to 3 weeks  Common causes of an acute cough include a cold, allergies, or a lung infection  DISCHARGE INSTRUCTIONS:   Return to the emergency department if:   You have trouble breathing or feel short of breath  You cough up blood, or you see blood in your mucus  You faint or feel weak or dizzy  You have chest pain when you cough or take a deep breath  You have new wheezing  Contact your healthcare provider if:   You have a fever  Your cough lasts longer than 4 weeks  Your symptoms do not improve with treatment  You have questions or concerns about your condition or care  Medicines:   Medicines  may be needed to stop the cough, decrease swelling in your airways, or help open your airways  Medicine may also be given to help you cough up mucus  Ask your healthcare provider what over-the-counter medicines you can take  If you have an infection caused by bacteria, you may need antibiotics  Take your medicine as directed  Contact your healthcare provider if you think your medicine is not helping or if you have side effects  Tell him or her if you are allergic to any medicine  Keep a list of the medicines, vitamins, and herbs you take  Include the amounts, and when and why you take them  Bring the list or the pill bottles to follow-up visits  Carry your medicine list with you in case of an emergency  Manage your symptoms:   Do not smoke and stay away from others who smoke    Nicotine and other chemicals in cigarettes and cigars can cause lung damage and make your cough worse  Ask your healthcare provider for information if you currently smoke and need help to quit  E-cigarettes or smokeless tobacco still contain nicotine  Talk to your healthcare provider before you use these products  Drink extra liquids as directed  Liquids will help thin and loosen mucus so you can cough it up  Liquids will also help prevent dehydration  Examples of good liquids to drink include water, fruit juice, and broth  Do not drink liquids that contain caffeine  Caffeine can increase your risk for dehydration  Ask your healthcare provider how much liquid to drink each day  Rest as directed  Do not do activities that make your cough worse, such as exercise  Use a humidifier or vaporizer  Use a cool mist humidifier or a vaporizer to increase air moisture in your home  This may make it easier for you to breathe and help decrease your cough  Eat 2 to 5 mL of honey 2 times each day  Honey can help thin mucus and decrease your cough  Use cough drops or lozenges  These can help decrease throat irritation and your cough  Follow up with your healthcare provider as directed:  Write down your questions so you remember to ask them during your visits  © Copyright [employer] 2022 Information is for End User's use only and may not be sold, redistributed or otherwise used for commercial purposes  All illustrations and images included in CareNotes® are the copyrighted property of [employer]  or [facility] Loreta Agarwal   The above information is an  only  It is not intended as medical advice for individual conditions or treatments  Talk to your doctor, nurse or pharmacist before following any medical regimen to see if it is safe and effective for you

## 2022-08-07 NOTE — PROGRESS NOTES
[facility]        NAME: Bimal Norris is a 19 y o  male  : 2000    MRN: [id number]  DATE: 2022  TIME: 5:02 PM    Assessment and Plan   Burning in the chest [R07 89]  1  Burning in the chest  Poct Covid 19 Rapid Antigen Test    ECG 12 lead   2  Dizziness and giddiness  Poct Covid 19 Rapid Antigen Test   3  Acute cough  Poct Covid 19 Rapid Antigen Test    XR chest pa & lateral     Rapid COVID 19- Negative  EKG- Normal Sinus Rhythm with sinus arrhythmia  Incomplete right bundle branch block  Chest Xray- Negative pending radiology report  Patient Instructions       Patient was educated on acute cough  Patient was educated on taking OTC allergy medications  Patient was offered Rapid strep but declined  Patient was educated on hydration  If symptoms persist follow up with PCP  Patient was told any chest pain, shortness of breath or high grade fevers go to ED  Patient was told based on EKG finding I recommend she go to ED for further evaluation  Chief Complaint     Chief Complaint   Patient presents with    Sore Throat     Patient states he started last week with s scratchy throat, yesterday he was sweating  and shaking  +cough  He has been vaccinated but has not taken a test         History of Present Illness       Patient is here today complaining of chest burning, sore throat , cough and dizziness for 1 day  Patient reports history of chronic cough  Patient reports dizziness subsided  Patient declined strep test  Admits allergy to Penicillins  Review of Systems   Review of Systems   Constitutional: Negative  HENT: Positive for congestion and sore throat  Respiratory: Positive for cough and chest tightness  Neurological: Positive for dizziness           Current Medications       Current Outpatient Medications:     famotidine (PEPCID) 20 mg tablet, Take 1 tablet (20 mg total) by mouth 2 (two) times a day, Disp: 30 tablet, Rfl: 0    montelukast (SINGULAIR) 10 mg tablet, Take 1 tablet (10 mg total) by mouth daily at bedtime, Disp: 30 tablet, Rfl: 5    omeprazole (PriLOSEC) 40 MG capsule, take 1 capsule by mouth once daily, Disp: 30 capsule, Rfl: 3    valsartan (DIOVAN) 80 mg tablet, take 1 tablet by mouth once daily, Disp: 90 tablet, Rfl: 1    albuterol (ACCUNEB) 1 25 MG/3ML nebulizer solution, Inhale 1 ampule every 6 (six) hours as needed, Disp: , Rfl:     albuterol (Ventolin HFA) 90 mcg/act inhaler, Inhale 2 puffs every 6 (six) hours as needed for wheezing, Disp: 2 Inhaler, Rfl: 0    azelastine (ASTELIN) 0 1 % nasal spray, instill 2 sprays into each nostril twice a day as directed (Patient not taking: Reported on 8/7/2022), Disp: 30 mL, Rfl: 1    buPROPion (WELLBUTRIN XL) 150 mg 24 hr tablet, take 1 tablet by mouth every morning, Disp: 90 tablet, Rfl: 3    Diclofenac Sodium (VOLTAREN) 1 %, Apply 2 g topically 4 (four) times a day (Patient not taking: Reported on 1/19/2022 ), Disp: 150 g, Rfl: 0    EPINEPHrine (EPIPEN 2-CUBA) 0 3 mg/0 3 mL SOAJ, EpiPen 2-[place] 0 3 MG/0 3ML Injection Solution Auto-injector Use as directed, prn allergic reaction  Quantity: 1;  Refills: 1   Quincy Ignacio DO ;  Start 31-July-2014 Active 2 Solution Auto-injector Pen, Disp: , Rfl:     fluticasone (FLONASE) 50 mcg/act nasal spray, 1 spray into each nostril daily (Patient not taking: Reported on 7/23/2021), Disp: 16 g, Rfl: 0    fluticasone (FLOVENT HFA) 110 MCG/ACT inhaler, Inhale 2 puffs 2 (two) times a day Rinse mouth after use   (Patient not taking: Reported on 8/7/2022), Disp: 1 Inhaler, Rfl: 0    Current Allergies     Allergies as of 08/07/2022 - Reviewed 08/07/2022   Allergen Reaction Noted    Nuts - food allergy Anaphylaxis     Shellfish-derived products - food allergy Anaphylaxis 08/06/2017    Other  08/06/2017    Peanut oil - food allergy  05/03/2017    Penicillins  10/30/2020            The following portions of the patient's history were reviewed and updated as appropriate: allergies, current medications, past family history, past medical history, past social history, past surgical history and problem list      Past Medical History:   Diagnosis Date    Asthma     Asthma due to seasonal allergies     GERD (gastroesophageal reflux disease)     Hypertension        Past Surgical History:   Procedure Laterality Date    COLONOSCOPY      NO PAST SURGERIES      UPPER GASTROINTESTINAL ENDOSCOPY      WISDOM TOOTH EXTRACTION         History reviewed  No pertinent family history  Medications have been verified  Objective   /94   Pulse 82   Temp 98 °F (36 7 °C)   Resp 20   Wt 82 6 kg (182 lb)   SpO2 99%   BMI 26 88 kg/m²   No LMP for male patient  Physical Exam     Physical Exam  Vitals reviewed  Constitutional:       Appearance: Normal appearance  HENT:      Head: Normocephalic  Comments: No pain over frontal and maxillary sinus     Right Ear: Tympanic membrane, ear canal and external ear normal       Left Ear: Tympanic membrane, ear canal and external ear normal       Nose: Nose normal       Mouth/Throat:      Mouth: Mucous membranes are moist       Pharynx: No oropharyngeal exudate or posterior oropharyngeal erythema  Comments: PND  Eyes:      Pupils: Pupils are equal, round, and reactive to light  Neck:      Comments: No palpable lymph nodes  Cardiovascular:      Rate and Rhythm: Normal rate and regular rhythm  Heart sounds: Normal heart sounds  Comments: No pain with chest compression  Pain with rib compression  Pulmonary:      Breath sounds: Normal breath sounds  Musculoskeletal:      Comments: Negative Romberg  No dizziness with head positioning   Neurological:      General: No focal deficit present  Mental Status: He is alert and oriented to person, place, and time     Psychiatric:         Mood and Affect: Mood normal          Behavior: Behavior normal

## 2022-08-10 DIAGNOSIS — K21.9 GASTROESOPHAGEAL REFLUX DISEASE WITHOUT ESOPHAGITIS: ICD-10-CM

## 2022-08-10 RX ORDER — OMEPRAZOLE 40 MG/1
CAPSULE, DELAYED RELEASE ORAL
Qty: 30 CAPSULE | Refills: 3 | Status: SHIPPED | OUTPATIENT
Start: 2022-08-10

## 2022-10-03 ENCOUNTER — HOSPITAL ENCOUNTER (EMERGENCY)
Facility: HOSPITAL | Age: 22
Discharge: HOME | End: 2022-10-03
Attending: EMERGENCY MEDICINE
Payer: COMMERCIAL

## 2022-10-03 VITALS
TEMPERATURE: 98.3 F | RESPIRATION RATE: 18 BRPM | HEART RATE: 92 BPM | WEIGHT: 176.37 LBS | BODY MASS INDEX: 26.05 KG/M2 | DIASTOLIC BLOOD PRESSURE: 78 MMHG | OXYGEN SATURATION: 98 % | SYSTOLIC BLOOD PRESSURE: 123 MMHG

## 2022-10-03 DIAGNOSIS — R51.9 HEADACHE: ICD-10-CM

## 2022-10-03 DIAGNOSIS — R41.0 CONFUSION AND DISORIENTATION: ICD-10-CM

## 2022-10-03 DIAGNOSIS — R42 DIZZINESS: Primary | ICD-10-CM

## 2022-10-03 LAB
ALBUMIN SERPL BCP-MCNC: 4 G/DL (ref 3.5–5)
ALP SERPL-CCNC: 61 U/L (ref 46–116)
ALT SERPL W P-5'-P-CCNC: 39 U/L (ref 12–78)
ANION GAP SERPL CALCULATED.3IONS-SCNC: 9 MMOL/L (ref 4–13)
AST SERPL W P-5'-P-CCNC: 21 U/L (ref 5–45)
ATRIAL RATE: 88 BPM
BASOPHILS # BLD AUTO: 0.04 THOUSANDS/ΜL (ref 0–0.1)
BASOPHILS NFR BLD AUTO: 1 % (ref 0–1)
BILIRUB SERPL-MCNC: 0.42 MG/DL (ref 0.2–1)
BILIRUB UR QL STRIP: NEGATIVE
BUN SERPL-MCNC: 8 MG/DL (ref 5–25)
CALCIUM SERPL-MCNC: 9.2 MG/DL (ref 8.3–10.1)
CHLORIDE SERPL-SCNC: 104 MMOL/L (ref 96–108)
CLARITY UR: CLEAR
CO2 SERPL-SCNC: 25 MMOL/L (ref 21–32)
COLOR UR: YELLOW
CREAT SERPL-MCNC: 0.85 MG/DL (ref 0.6–1.3)
EOSINOPHIL # BLD AUTO: 0.25 THOUSAND/ΜL (ref 0–0.61)
EOSINOPHIL NFR BLD AUTO: 5 % (ref 0–6)
ERYTHROCYTE [DISTWIDTH] IN BLOOD BY AUTOMATED COUNT: 11.9 % (ref 11.6–15.1)
GFR SERPL CREATININE-BSD FRML MDRD: 122 ML/MIN/1.73SQ M
GLUCOSE SERPL-MCNC: 99 MG/DL (ref 65–140)
GLUCOSE UR STRIP-MCNC: NEGATIVE MG/DL
HCT VFR BLD AUTO: 44.2 % (ref 36.5–49.3)
HGB BLD-MCNC: 14.8 G/DL (ref 12–17)
HGB UR QL STRIP.AUTO: NEGATIVE
IMM GRANULOCYTES # BLD AUTO: 0.01 THOUSAND/UL (ref 0–0.2)
IMM GRANULOCYTES NFR BLD AUTO: 0 % (ref 0–2)
KETONES UR STRIP-MCNC: NEGATIVE MG/DL
LEUKOCYTE ESTERASE UR QL STRIP: NEGATIVE
LYMPHOCYTES # BLD AUTO: 1.47 THOUSANDS/ΜL (ref 0.6–4.47)
LYMPHOCYTES NFR BLD AUTO: 32 % (ref 14–44)
MCH RBC QN AUTO: 29.9 PG (ref 26.8–34.3)
MCHC RBC AUTO-ENTMCNC: 33.5 G/DL (ref 31.4–37.4)
MCV RBC AUTO: 89 FL (ref 82–98)
MONOCYTES # BLD AUTO: 0.6 THOUSAND/ΜL (ref 0.17–1.22)
MONOCYTES NFR BLD AUTO: 13 % (ref 4–12)
NEUTROPHILS # BLD AUTO: 2.28 THOUSANDS/ΜL (ref 1.85–7.62)
NEUTS SEG NFR BLD AUTO: 49 % (ref 43–75)
NITRITE UR QL STRIP: NEGATIVE
NRBC BLD AUTO-RTO: 0 /100 WBCS
P AXIS: 24 DEGREES
PH UR STRIP.AUTO: 8.5 [PH] (ref 4.5–8)
PLATELET # BLD AUTO: 327 THOUSANDS/UL (ref 149–390)
PMV BLD AUTO: 9.1 FL (ref 8.9–12.7)
POTASSIUM SERPL-SCNC: 4 MMOL/L (ref 3.5–5.3)
PR INTERVAL: 148 MS
PROT SERPL-MCNC: 7.8 G/DL (ref 6.4–8.4)
PROT UR STRIP-MCNC: NEGATIVE MG/DL
QRS AXIS: 46 DEGREES
QRSD INTERVAL: 110 MS
QT INTERVAL: 340 MS
QTC INTERVAL: 411 MS
RBC # BLD AUTO: 4.95 MILLION/UL (ref 3.88–5.62)
SARS-COV-2 RNA RESP QL NAA+PROBE: NEGATIVE
SODIUM SERPL-SCNC: 138 MMOL/L (ref 135–147)
SP GR UR STRIP.AUTO: 1.02 (ref 1–1.03)
T WAVE AXIS: 24 DEGREES
UROBILINOGEN UR QL STRIP.AUTO: 0.2 E.U./DL
VENTRICULAR RATE: 88 BPM
WBC # BLD AUTO: 4.65 THOUSAND/UL (ref 4.31–10.16)

## 2022-10-03 PROCEDURE — U0003 INFECTIOUS AGENT DETECTION BY NUCLEIC ACID (DNA OR RNA); SEVERE ACUTE RESPIRATORY SYNDROME CORONAVIRUS 2 (SARS-COV-2) (CORONAVIRUS DISEASE [COVID-19]), AMPLIFIED PROBE TECHNIQUE, MAKING USE OF HIGH THROUGHPUT TECHNOLOGIES AS DESCRIBED BY CMS-2020-01-R: HCPCS | Performed by: EMERGENCY MEDICINE

## 2022-10-03 PROCEDURE — 93010 ELECTROCARDIOGRAM REPORT: CPT

## 2022-10-03 PROCEDURE — 80053 COMPREHEN METABOLIC PANEL: CPT | Performed by: EMERGENCY MEDICINE

## 2022-10-03 PROCEDURE — 81003 URINALYSIS AUTO W/O SCOPE: CPT

## 2022-10-03 PROCEDURE — 85025 COMPLETE CBC W/AUTO DIFF WBC: CPT | Performed by: EMERGENCY MEDICINE

## 2022-10-03 PROCEDURE — 93005 ELECTROCARDIOGRAM TRACING: CPT

## 2022-10-03 PROCEDURE — 36415 COLL VENOUS BLD VENIPUNCTURE: CPT | Performed by: EMERGENCY MEDICINE

## 2022-10-03 PROCEDURE — 99285 EMERGENCY DEPT VISIT HI MDM: CPT | Performed by: EMERGENCY MEDICINE

## 2022-10-03 PROCEDURE — 99284 EMERGENCY DEPT VISIT MOD MDM: CPT

## 2022-10-03 PROCEDURE — U0005 INFEC AGEN DETEC AMPLI PROBE: HCPCS | Performed by: EMERGENCY MEDICINE

## 2022-10-03 RX ORDER — NAPROXEN 500 MG/1
500 TABLET ORAL EVERY 12 HOURS PRN
Qty: 15 TABLET | Refills: 0 | Status: SHIPPED | OUTPATIENT
Start: 2022-10-03

## 2022-10-03 NOTE — ED PROVIDER NOTES
History  Chief Complaint   Patient presents with    Dizziness     Dizziness, chest pain and dull headache x1 week     77-year-old male with history of hypertension, asthma presents to the ED with multiple complaints  He states he was at work this morning and complained of dizziness  a friend of his checked his blood pressure and was told it was low and so he came to the ED  He states that he has been having a dull headache across his forehead over the last several weeks and also feels like he is having memory issues and confusion  He denies any recent illness, although he is constantly coughing in the room  No visual complaints or speech deficits  No focal weakness  No nausea or vomiting  The headache is the same all the time  He takes Tylenol with only temporary relief of the headache  He also complains of feeling like everything is spinning from time to time  No ear complaints  He complains occasional left lower quadrant pain  No diarrhea, constipation or urinary complaints  He denies drug or alcohol use  He states he is sleeping well  No recent change in his medications        History provided by:  Patient   used: No    Dizziness  Quality:  Vertigo  Onset quality:  Gradual  Duration:  1 week  Timing:  Intermittent  Progression:  Waxing and waning  Chronicity:  New  Context: head movement and standing up    Relieved by:  None tried  Worsened by:  Standing up and turning head  Ineffective treatments:  None tried  Associated symptoms: headaches    Associated symptoms: no blood in stool, no chest pain, no diarrhea, no hearing loss, no nausea, no palpitations, no shortness of breath, no syncope, no tinnitus, no vision changes, no vomiting and no weakness    Headaches:     Severity:  Moderate    Onset quality:  Gradual    Duration:  2 weeks    Timing:  Intermittent    Progression:  Unchanged    Chronicity:  New  Risk factors: no anemia, no heart disease, no hx of stroke, no hx of vertigo, no Meniere's disease, no multiple medications and no new medications        Prior to Admission Medications   Prescriptions Last Dose Informant Patient Reported? Taking? Diclofenac Sodium (VOLTAREN) 1 %   No No   Sig: Apply 2 g topically 4 (four) times a day   Patient not taking: Reported on 2022    EPINEPHrine (EPIPEN) 0 3 mg/0 3 mL SOAJ  Self Yes No   Sig: EpiPen 2-[place] 0 3 MG/0 3ML Injection Solution Auto-injector  Use as directed, prn allergic reaction   Quantity: 1;  Refills: 1      Quincy Ignacio DO ;  Start   Active  2 Solution Auto-injector Pen   albuterol (ACCUNEB) 1 25 MG/3ML nebulizer solution  Self Yes No   Sig: Inhale 1 ampule every 6 (six) hours as needed   albuterol (Ventolin HFA) 90 mcg/act inhaler  Self No No   Sig: Inhale 2 puffs every 6 (six) hours as needed for wheezing   azelastine (ASTELIN) 0 1 % nasal spray   No No   Sig: instill 2 sprays into each nostril twice a day as directed   Patient not taking: Reported on 2022   buPROPion (WELLBUTRIN XL) 150 mg 24 hr tablet   No No   Sig: take 1 tablet by mouth every morning   famotidine (PEPCID) 20 mg tablet  Self No No   Sig: Take 1 tablet (20 mg total) by mouth 2 (two) times a day   Patient not taking: Reported on 2022   fluticasone (FLONASE) 50 mcg/act nasal spray  Self No No   Si spray into each nostril daily   Patient not taking: No sig reported   fluticasone (FLOVENT HFA) 110 MCG/ACT inhaler   No No   Sig: Inhale 2 puffs 2 (two) times a day Rinse mouth after use     Patient not taking: No sig reported   meclizine (ANTIVERT) 25 mg tablet   No No   Sig: Take 1 tablet (25 mg total) by mouth 3 (three) times a day as needed for dizziness   montelukast (SINGULAIR) 10 mg tablet  Self No No   Sig: Take 1 tablet (10 mg total) by mouth daily at bedtime   Patient not taking: Reported on 2022   omeprazole (PriLOSEC) 40 MG capsule   No No   Sig: take 1 capsule by mouth every evening   valsartan (DIOVAN) 80 mg tablet No No   Sig: take 1 tablet by mouth once daily      Facility-Administered Medications: None       Past Medical History:   Diagnosis Date    Asthma     Asthma due to seasonal allergies     GERD (gastroesophageal reflux disease)     Hypertension        Past Surgical History:   Procedure Laterality Date    COLONOSCOPY      NO PAST SURGERIES      UPPER GASTROINTESTINAL ENDOSCOPY      WISDOM TOOTH EXTRACTION         History reviewed  No pertinent family history  I have reviewed and agree with the history as documented  E-Cigarette/Vaping    E-Cigarette Use Former User     Start Date 14     Cartridges/Day 1 cartridge every 2 weeks      E-Cigarette/Vaping Substances    Nicotine No     THC No     CBD No     Flavoring No     Other No     Unknown No      Social History     Tobacco Use    Smoking status: Current Every Day Smoker     Packs/day: 0 25     Types: Cigarettes     Last attempt to quit: 2019     Years since quittin 8    Smokeless tobacco: Never Used    Tobacco comment: vapes   Vaping Use    Vaping Use: Former    Start date: 2014   Substance Use Topics    Alcohol use: Not Currently    Drug use: No       Review of Systems   Constitutional: Negative  HENT: Negative  Negative for hearing loss and tinnitus  Eyes: Negative  Respiratory: Negative  Negative for shortness of breath  Cardiovascular: Negative  Negative for chest pain, palpitations and syncope  Gastrointestinal: Negative  Negative for blood in stool, diarrhea, nausea and vomiting  Genitourinary: Negative  Musculoskeletal: Negative for neck pain  Skin: Negative  Allergic/Immunologic: Negative  Neurological: Positive for dizziness and headaches  Negative for tremors, seizures, syncope, facial asymmetry, speech difficulty, weakness, light-headedness and numbness  Hematological: Negative  Psychiatric/Behavioral: Negative  All other systems reviewed and are negative        Physical Exam  Physical Exam  Vitals and nursing note reviewed  Constitutional:       General: He is awake  He is not in acute distress  Appearance: Normal appearance  He is well-developed and normal weight  He is not ill-appearing, toxic-appearing or diaphoretic  HENT:      Head: Normocephalic and atraumatic  Right Ear: Tympanic membrane and external ear normal       Left Ear: Tympanic membrane and external ear normal       Nose: Nose normal       Mouth/Throat:      Mouth: Mucous membranes are moist    Eyes:      General: No scleral icterus  Extraocular Movements: Extraocular movements intact  Right eye: No nystagmus  Left eye: No nystagmus  Conjunctiva/sclera: Conjunctivae normal       Pupils: Pupils are equal, round, and reactive to light  Neck:      Thyroid: No thyromegaly  Vascular: No JVD  Cardiovascular:      Rate and Rhythm: Normal rate and regular rhythm  Heart sounds: Normal heart sounds  No murmur heard  No gallop  Pulmonary:      Effort: Pulmonary effort is normal  No respiratory distress  Breath sounds: Normal breath sounds  No stridor  No wheezing, rhonchi or rales  Abdominal:      General: Bowel sounds are normal  There is no distension  Palpations: Abdomen is soft  There is no mass  Tenderness: There is no abdominal tenderness  Hernia: No hernia is present  Musculoskeletal:         General: No tenderness or deformity  Normal range of motion  Cervical back: Normal range of motion and neck supple  Right lower leg: No edema  Left lower leg: No edema  Lymphadenopathy:      Cervical: No cervical adenopathy  Skin:     General: Skin is warm and dry  Coloration: Skin is not jaundiced or pale  Findings: No bruising, erythema, lesion or rash  Neurological:      General: No focal deficit present  Mental Status: He is alert and oriented to person, place, and time  Cranial Nerves: No cranial nerve deficit  Motor: No weakness  Coordination: Coordination normal       Gait: Gait normal       Deep Tendon Reflexes: Reflexes are normal and symmetric  Reflexes normal    Psychiatric:         Mood and Affect: Mood normal          Behavior: Behavior is cooperative           Vital Signs  ED Triage Vitals   Temperature Pulse Respirations Blood Pressure SpO2   10/03/22 1050 10/03/22 1052 10/03/22 1052 10/03/22 1052 10/03/22 1052   98 3 °F (36 8 °C) 103 20 156/91 98 %      Temp Source Heart Rate Source Patient Position - Orthostatic VS BP Location FiO2 (%)   10/03/22 1050 10/03/22 1052 10/03/22 1052 10/03/22 1052 --   Oral Monitor Sitting Right arm       Pain Score       10/03/22 1052       6           Vitals:    10/03/22 1052   BP: 156/91   Pulse: 103   Patient Position - Orthostatic VS: Sitting         Visual Acuity      ED Medications  Medications - No data to display    Diagnostic Studies  Results Reviewed     Procedure Component Value Units Date/Time    CBC and differential [[id number]]     Lab Status: No result Specimen: Blood     Comprehensive metabolic panel [[id number]]     Lab Status: No result Specimen: Blood     COVID only [[id number]]     Lab Status: No result Specimen: Nares from Nose                  No orders to display              Procedures  ECG 12 Lead Documentation Only    Date/Time: 10/3/2022 11:50 AM  Performed by: All Resendez DO  Authorized by: All Resendez DO     Indications / Diagnosis:  Dizzy  ECG reviewed by me, the ED Provider: yes    Patient location:  ED  Interpretation:     Interpretation: normal    Rate:     ECG rate:  88    ECG rate assessment: normal    Rhythm:     Rhythm: sinus rhythm    Ectopy:     Ectopy: none    Conduction:     Conduction: normal    ST segments:     ST segments:  Normal  T waves:     T waves: normal               ED Course                                             MDM  Number of Diagnoses or Management Options  Diagnosis management comments: 17-year-old male presents to the ED with multiple complaints  He states he was at work this morning felt dizzy and a friend checked his blood pressure  He was told it was alone so he came to the ED  He has been complaining of intermittent vertigo over the last couple of weeks along with a dull frontal headache  He also states he has felt confused  On exam he is alert no acute distress  His exam is essentially normal with a normal neuro and cerebellar exam   Will check basic labs just to rule out possibility of electrolyte abnormality, hyper or hypoglycemia  Will check urine  Given his normal neurologic exam will not obtain CT of the head at this time   There is a very low clinical suspicion for CVA versus bleed  If everything is essentially normal here will treat with vertigo, NSAIDs and have patient follow-up with family doctor for possible referral to Neurology for ongoing symptoms of headache and confusion  Amount and/or Complexity of Data Reviewed  Clinical lab tests: ordered and reviewed  Independent visualization of images, tracings, or specimens: yes        Disposition  Final diagnoses:   None     ED Disposition     None      Follow-up Information    None         Patient's Medications   Discharge Prescriptions    No medications on file       No discharge procedures on file      PDMP Review     None          ED Provider  Electronically Signed by           Larry Cardenas DO  10/03/22 [id number]

## 2022-11-25 ENCOUNTER — HOSPITAL ENCOUNTER (EMERGENCY)
Facility: HOSPITAL | Age: 22
Discharge: HOME | End: 2022-11-25
Attending: EMERGENCY MEDICINE

## 2022-11-25 ENCOUNTER — APPOINTMENT (EMERGENCY)
Dept: CT IMAGING | Facility: HOSPITAL | Age: 22
End: 2022-11-25

## 2022-11-25 VITALS
RESPIRATION RATE: 16 BRPM | OXYGEN SATURATION: 99 % | TEMPERATURE: 99.2 F | WEIGHT: 186.95 LBS | BODY MASS INDEX: 27.61 KG/M2 | DIASTOLIC BLOOD PRESSURE: 70 MMHG | SYSTOLIC BLOOD PRESSURE: 121 MMHG | HEART RATE: 96 BPM

## 2022-11-25 DIAGNOSIS — R07.89 CHEST WALL PAIN: Primary | ICD-10-CM

## 2022-11-25 LAB
ALBUMIN SERPL BCP-MCNC: 4 G/DL (ref 3.5–5)
ALP SERPL-CCNC: 79 U/L (ref 46–116)
ALT SERPL W P-5'-P-CCNC: 46 U/L (ref 12–78)
ANION GAP SERPL CALCULATED.3IONS-SCNC: 10 MMOL/L (ref 4–13)
ATRIAL RATE: 90 BPM
BASOPHILS # BLD AUTO: 0.05 THOUSANDS/ÂΜL (ref 0–0.1)
BASOPHILS NFR BLD AUTO: 1 % (ref 0–1)
BILIRUB SERPL-MCNC: 0.29 MG/DL (ref 0.2–1)
BUN SERPL-MCNC: 10 MG/DL (ref 5–25)
CALCIUM SERPL-MCNC: 9.1 MG/DL (ref 8.3–10.1)
CARDIAC TROPONIN I PNL SERPL HS: <2 NG/L
CHLORIDE SERPL-SCNC: 101 MMOL/L (ref 96–108)
CO2 SERPL-SCNC: 27 MMOL/L (ref 21–32)
CREAT SERPL-MCNC: 0.79 MG/DL (ref 0.6–1.3)
D DIMER PPP FEU-MCNC: 0.96 UG/ML FEU
EOSINOPHIL # BLD AUTO: 0.05 THOUSAND/ÂΜL (ref 0–0.61)
EOSINOPHIL NFR BLD AUTO: 1 % (ref 0–6)
ERYTHROCYTE [DISTWIDTH] IN BLOOD BY AUTOMATED COUNT: 11.7 % (ref 11.6–15.1)
FLUAV RNA RESP QL NAA+PROBE: NEGATIVE
FLUBV RNA RESP QL NAA+PROBE: NEGATIVE
GFR SERPL CREATININE-BSD FRML MDRD: 125 ML/MIN/1.73SQ M
GLUCOSE SERPL-MCNC: 79 MG/DL (ref 65–140)
HCT VFR BLD AUTO: 45.2 % (ref 36.5–49.3)
HGB BLD-MCNC: 15.4 G/DL (ref 12–17)
IMM GRANULOCYTES # BLD AUTO: 0.02 THOUSAND/UL (ref 0–0.2)
IMM GRANULOCYTES NFR BLD AUTO: 0 % (ref 0–2)
LYMPHOCYTES # BLD AUTO: 1.09 THOUSANDS/ÂΜL (ref 0.6–4.47)
LYMPHOCYTES NFR BLD AUTO: 17 % (ref 14–44)
MCH RBC QN AUTO: 30.1 PG (ref 26.8–34.3)
MCHC RBC AUTO-ENTMCNC: 34.1 G/DL (ref 31.4–37.4)
MCV RBC AUTO: 89 FL (ref 82–98)
MONOCYTES # BLD AUTO: 0.47 THOUSAND/ÂΜL (ref 0.17–1.22)
MONOCYTES NFR BLD AUTO: 7 % (ref 4–12)
NEUTROPHILS # BLD AUTO: 4.73 THOUSANDS/ÂΜL (ref 1.85–7.62)
NEUTS SEG NFR BLD AUTO: 74 % (ref 43–75)
NRBC BLD AUTO-RTO: 0 /100 WBCS
P AXIS: 64 DEGREES
PLATELET # BLD AUTO: 284 THOUSANDS/UL (ref 149–390)
PMV BLD AUTO: 9.5 FL (ref 8.9–12.7)
POTASSIUM SERPL-SCNC: 3.6 MMOL/L (ref 3.5–5.3)
PR INTERVAL: 148 MS
PROT SERPL-MCNC: 7.7 G/DL (ref 6.4–8.4)
QRS AXIS: 61 DEGREES
QRSD INTERVAL: 106 MS
QT INTERVAL: 334 MS
QTC INTERVAL: 408 MS
RBC # BLD AUTO: 5.11 MILLION/UL (ref 3.88–5.62)
RSV RNA RESP QL NAA+PROBE: NEGATIVE
SARS-COV-2 RNA RESP QL NAA+PROBE: NEGATIVE
SODIUM SERPL-SCNC: 138 MMOL/L (ref 135–147)
T WAVE AXIS: 56 DEGREES
VENTRICULAR RATE: 90 BPM
WBC # BLD AUTO: 6.41 THOUSAND/UL (ref 4.31–10.16)

## 2022-11-25 RX ORDER — MAGNESIUM HYDROXIDE/ALUMINUM HYDROXICE/SIMETHICONE 120; 1200; 1200 MG/30ML; MG/30ML; MG/30ML
30 SUSPENSION ORAL ONCE
Status: COMPLETED | OUTPATIENT
Start: 2022-11-25 | End: 2022-11-25

## 2022-11-25 RX ORDER — KETOROLAC TROMETHAMINE 30 MG/ML
15 INJECTION, SOLUTION INTRAMUSCULAR; INTRAVENOUS ONCE
Status: COMPLETED | OUTPATIENT
Start: 2022-11-25 | End: 2022-11-25

## 2022-11-25 RX ADMIN — KETOROLAC TROMETHAMINE 15 MG: 30 INJECTION, SOLUTION INTRAMUSCULAR; INTRAVENOUS at 19:36

## 2022-11-25 RX ADMIN — ALUMINUM HYDROXIDE, MAGNESIUM HYDROXIDE, AND SIMETHICONE 30 ML: 200; 200; 20 SUSPENSION ORAL at 19:37

## 2022-11-25 RX ADMIN — SODIUM CHLORIDE 1000 ML: 0.9 INJECTION, SOLUTION INTRAVENOUS at 19:36

## 2022-11-25 RX ADMIN — IOHEXOL 85 ML: 350 INJECTION, SOLUTION INTRAVENOUS at 21:06

## 2022-11-26 NOTE — ED PROVIDER NOTES
History  Chief Complaint   Patient presents with   • Rib Pain     [facility]  Was told HR was elevated  C/o burning sensation in ribs and generally not feeling well x4 weeks with lung pain, sore throat, and ear pain  The patient is a 23 YOM with hx asthma, GERD, and HTN who presents to the ED from [facility] who referred him to the ED after noting that his HR was elevated  He reports a 2 day history of bilateral rib pain described as a burning  He reports that he has also had right-sided ear pain and a sore throat for the past 4 weeks, as well as generalized malaise  He does report right ankle/calf pain that has been present x 1 day, and reports that he recently drove for 13 hours straight  He reports that he took 1-2 breaks during this trip  He does report some chest discomfort when he coughs, and states that he has a chronic cough x 4 years  He does use a vape pen  He otherwise denies fever, chills, dysphagia, otorrhea, dyspnea, leg swelling, abdominal pain, nausea, vomiting, diarrhea, GI bleeding, urinary symptoms, headache, dizziness, vision changes, numbness/paresthesia, weakness  Prior to Admission Medications   Prescriptions Last Dose Informant Patient Reported? Taking?    Diclofenac Sodium (VOLTAREN) 1 %   No No   Sig: Apply 2 g topically 4 (four) times a day   Patient not taking: Reported on 1/19/2022    EPINEPHrine (EPIPEN) 0 3 mg/0 3 mL SOAJ   Yes No   Sig: EpiPen 2-Jayme 0 3 MG/0 3ML Injection Solution Auto-injector  Use as directed, prn allergic reaction   Quantity: 1;  Refills: 1      Quincy Ignacio DO ;  Start 31-July-2014  Active  2 Solution Auto-injector Pen   albuterol (ACCUNEB) 1 25 MG/3ML nebulizer solution   Yes No   Sig: Inhale 1 ampule every 6 (six) hours as needed   Patient not taking: Reported on 10/3/2022   albuterol (Ventolin HFA) 90 mcg/act inhaler   No No   Sig: Inhale 2 puffs every 6 (six) hours as needed for wheezing   Patient not taking: Reported on 10/3/2022   azelastine (ASTELIN) 0 1 % nasal spray   No No   Sig: instill 2 sprays into each nostril twice a day as directed   Patient not taking: No sig reported   buPROPion (WELLBUTRIN XL) 150 mg 24 hr tablet   No No   Sig: take 1 tablet by mouth every morning   famotidine (PEPCID) 20 mg tablet   No No   Sig: Take 1 tablet (20 mg total) by mouth 2 (two) times a day   Patient not taking: Reported on 2022   fluticasone (FLONASE) 50 mcg/act nasal spray   No No   Si spray into each nostril daily   fluticasone (FLOVENT HFA) 110 MCG/ACT inhaler   No No   Sig: Inhale 2 puffs 2 (two) times a day Rinse mouth after use  meclizine (ANTIVERT) 25 mg tablet   No No   Sig: Take 1 tablet (25 mg total) by mouth 3 (three) times a day as needed for dizziness   montelukast (SINGULAIR) 10 mg tablet   No No   Sig: Take 1 tablet (10 mg total) by mouth daily at bedtime   Patient not taking: Reported on 2022   naproxen (NAPROSYN) 500 mg tablet   No No   Sig: Take 1 tablet (500 mg total) by mouth every 12 (twelve) hours as needed for mild pain, moderate pain or headaches   omeprazole (PriLOSEC) 40 MG capsule   No No   Sig: take 1 capsule by mouth every evening   valsartan (DIOVAN) 80 mg tablet   No No   Sig: take 1 tablet by mouth once daily      Facility-Administered Medications: None       Past Medical History:   Diagnosis Date   • Asthma    • Asthma due to seasonal allergies    • GERD (gastroesophageal reflux disease)    • Hypertension        Past Surgical History:   Procedure Laterality Date   • COLONOSCOPY     • NO PAST SURGERIES     • UPPER GASTROINTESTINAL ENDOSCOPY     • WISDOM TOOTH EXTRACTION         History reviewed  No pertinent family history  I have reviewed and agree with the history as documented      E-Cigarette/Vaping   • E-Cigarette Use Former User    • Start Date 14    • Cartridges/Day 1 cartridge every 2 weeks      E-Cigarette/Vaping Substances   • Nicotine No    • THC No    • CBD No    • Flavoring No    • Other No    • Unknown No      Social History     Tobacco Use   • Smoking status: Every Day     Packs/day: 0 25     Types: Cigarettes     Last attempt to quit: 2019     Years since quittin 9   • Smokeless tobacco: Never   • Tobacco comments:     vapes   Vaping Use   • Vaping Use: Former   • Start date: 2014   Substance Use Topics   • Alcohol use: Yes     Comment: every few months   • Drug use: No       Review of Systems   Constitutional: Negative for chills, fever and unexpected weight change  HENT: Positive for ear pain and sore throat  Negative for congestion, ear discharge, hearing loss, rhinorrhea and trouble swallowing  Eyes: Negative for photophobia and visual disturbance  Respiratory: Positive for cough  Negative for shortness of breath  Cardiovascular: Positive for chest pain  Negative for palpitations and leg swelling  Gastrointestinal: Negative for abdominal pain, constipation, diarrhea, nausea and vomiting  Genitourinary: Negative for dysuria and flank pain  Musculoskeletal: Negative for arthralgias and myalgias  Skin: Negative for rash and wound  Neurological: Negative for dizziness, weakness, numbness and headaches  Psychiatric/Behavioral: Negative for behavioral problems  Physical Exam  Physical Exam  Vitals and nursing note reviewed  Constitutional:       General: He is not in acute distress  Appearance: He is well-developed  He is not ill-appearing or toxic-appearing  HENT:      Head: Normocephalic and atraumatic  Nose: Nose normal       Mouth/Throat:      Mouth: Mucous membranes are moist       Pharynx: Posterior oropharyngeal erythema (mild posterior pharyngeal erythema) present  No oropharyngeal exudate  Eyes:      Extraocular Movements: Extraocular movements intact  Conjunctiva/sclera: Conjunctivae normal    Cardiovascular:      Rate and Rhythm: Regular rhythm  Tachycardia present  Pulses: Normal pulses  Heart sounds: Normal heart sounds  No murmur heard  No friction rub  Pulmonary:      Effort: Pulmonary effort is normal  No respiratory distress  Breath sounds: Normal breath sounds  No stridor  No wheezing, rhonchi or rales  Abdominal:      Palpations: Abdomen is soft  Tenderness: There is no abdominal tenderness  There is no guarding or rebound  Musculoskeletal:         General: No swelling or tenderness (no calf tenderness)  Cervical back: Neck supple  Right lower leg: No edema  Left lower leg: No edema  Skin:     General: Skin is warm and dry  Capillary Refill: Capillary refill takes less than 2 seconds  Neurological:      General: No focal deficit present  Mental Status: He is alert  Sensory: No sensory deficit  Motor: No weakness     Psychiatric:         Mood and Affect: Mood normal          Vital Signs  ED Triage Vitals   Temperature Pulse Respirations Blood Pressure SpO2   11/25/22 1853 11/25/22 1852 11/25/22 1852 11/25/22 1852 11/25/22 1852   99 2 °F (37 3 °C) (!) 108 16 132/86 98 %      Temp Source Heart Rate Source Patient Position - Orthostatic VS BP Location FiO2 (%)   11/25/22 1853 11/25/22 2151 11/25/22 2151 11/25/22 2151 --   Oral Monitor Lying Left arm       Pain Score       11/25/22 1852       3           Vitals:    11/25/22 1852 11/25/22 2151   BP: 132/86 121/70   Pulse: (!) 108 96   Patient Position - Orthostatic VS:  Lying         Visual Acuity      ED Medications  Medications   sodium chloride 0 9 % bolus 1,000 mL (0 mL Intravenous Stopped 11/25/22 2220)   ketorolac (TORADOL) injection 15 mg (15 mg Intravenous Given 11/25/22 1936)   aluminum-magnesium hydroxide-simethicone (MYLANTA) oral suspension 30 mL (30 mL Oral Given 11/25/22 1937)   iohexol (OMNIPAQUE) 350 MG/ML injection (SINGLE-DOSE) 85 mL (85 mL Intravenous Given 11/25/22 2106)       Diagnostic Studies  Results Reviewed     Procedure Component Value Units Date/Time    FLU/RSV/COVID - if FLU/RSV clinically relevant [[id number]]  (Normal) Collected: 11/25/22 1940    Lab Status: Final result Specimen: Nares from Nose Updated: 11/25/22 2100     SARS-CoV-2 Negative     INFLUENZA A PCR Negative     INFLUENZA B PCR Negative     RSV PCR Negative    Narrative:      FOR PEDIATRIC PATIENTS - copy/paste COVID Guidelines URL to browser: [email or web address]  [email or web address]    SARS-CoV-2 assay is a Nucleic Acid Amplification assay intended for the  qualitative detection of nucleic acid from SARS-CoV-2 in nasopharyngeal  swabs  Results are for the presumptive identification of SARS-CoV-2 RNA  Positive results are indicative of infection with SARS-CoV-2, the virus  causing COVID-19, but do not rule out bacterial infection or co-infection  with other viruses  Laboratories within the [place] and its  territories are required to report all positive results to the appropriate  public health authorities  Negative results do not preclude SARS-CoV-2  infection and should not be used as the sole basis for treatment or other  patient management decisions  Negative results must be combined with  clinical observations, patient history, and epidemiological information  This test has not been FDA cleared or approved  This test has been authorized by FDA under an Emergency Use Authorization  (EUA)  This test is only authorized for the duration of time the  declaration that circumstances exist justifying the authorization of the  emergency use of an in vitro diagnostic tests for detection of SARS-CoV-2  virus and/or diagnosis of COVID-19 infection under section 564(b)(1) of  the Act, 21 U  S C  304FUS-7(W)(3), unless the authorization is terminated  or revoked sooner  The test has been validated but independent review by FDA  and CLIA is pending  Test performed using [employer]:  This RT-PCR assay targets N2,  a region unique to SARS-CoV-2  A conserved region in the E-gene was chosen  for pan-Sarbecovirus detection which includes SARS-CoV-2  According to CMS-2020-01-R, this platform meets the definition of high-throughput technology      HS Troponin 0hr (reflex protocol) [[id number]]  (Normal) Collected: 11/25/22 1940    Lab Status: Final result Specimen: Blood from Arm, Left Updated: 11/25/22 2048     hs TnI 0hr <2 ng/L     D-Dimer [[id number]]  (Abnormal) Collected: 11/25/22 1940    Lab Status: Final result Specimen: Blood from Arm, Left Updated: 11/25/22 2043     D-Dimer, Quant 0 96 ug/ml FEU     Comprehensive metabolic panel [[id number]] Collected: 11/25/22 1940    Lab Status: Final result Specimen: Blood from Arm, Left Updated: 11/25/22 2042     Sodium 138 mmol/L      Potassium 3 6 mmol/L      Chloride 101 mmol/L      CO2 27 mmol/L      ANION GAP 10 mmol/L      BUN 10 mg/dL      Creatinine 0 79 mg/dL      Glucose 79 mg/dL      Calcium 9 1 mg/dL      AST --     ALT 46 U/L      Alkaline Phosphatase 79 U/L      Total Protein 7 7 g/dL      Albumin 4 0 g/dL      Total Bilirubin 0 29 mg/dL      eGFR 125 ml/min/1 73sq m     Narrative:      [place] guidelines for Chronic Kidney Disease (CKD):   •  Stage 1 with normal or high GFR (GFR > 90 mL/min/1 73 square meters)  •  Stage 2 Mild CKD (GFR = 60-89 mL/min/1 73 square meters)  •  Stage 3A Moderate CKD (GFR = 45-59 mL/min/1 73 square meters)  •  Stage 3B Moderate CKD (GFR = 30-44 mL/min/1 73 square meters)  •  Stage 4 Severe CKD (GFR = 15-29 mL/min/1 73 square meters)  •  Stage 5 End Stage CKD (GFR <15 mL/min/1 73 square meters)  Note: GFR calculation is accurate only with a steady state creatinine    CBC and differential [[id number]] Collected: 11/25/22 1940    Lab Status: Final result Specimen: Blood from Arm, Left Updated: 11/25/22 2024     WBC 6 41 Thousand/uL      RBC 5 11 Million/uL      Hemoglobin 15 4 g/dL      Hematocrit 45 2 %      MCV 89 fL      MCH 30 1 pg MCHC 34 1 g/dL      RDW 11 7 %      MPV 9 5 fL      Platelets 490 Thousands/uL      nRBC 0 /100 WBCs      Neutrophils Relative 74 %      Immat GRANS % 0 %      Lymphocytes Relative 17 %      Monocytes Relative 7 %      Eosinophils Relative 1 %      Basophils Relative 1 %      Neutrophils Absolute 4 73 Thousands/µL      Immature Grans Absolute 0 02 Thousand/uL      Lymphocytes Absolute 1 09 Thousands/µL      Monocytes Absolute 0 47 Thousand/µL      Eosinophils Absolute 0 05 Thousand/µL      Basophils Absolute 0 05 Thousands/µL                  CTA ED chest PE Study   Final Result by Cherelle Hester DO (11/25 2120)      No pulmonary embolus or acute inflammatory process identified  Workstation performed: [id number]                    Procedures  Procedures         ED Course  ED Course as of 11/26/22 0010   Fri Nov 25, 2022 2047 D-Dimer, Quant(!): 0 96  Will obtain CTA   2047 eGFR: 125   2048 hs TnI 0hr: <2   2154 EKG: NSR at 90 BPM, normal axis, normal intervals, RSR' in V1 and V2, no ST elevation or depression        MDM  Number of Diagnoses or Management Options  Chest wall pain: new and requires workup  Diagnosis management comments: Patient is a 23 YOM presenting to the ED for evaluation of rib pain, sore throat, right ear pain, malaise  Was sent from urgent care for elevated HR   BPM in ED  Also reports right calf pain, recent 13 hour car trip  On exam, patient is in no acute distress  Lungs CTA bilaterally  Patient tachycardic on my exam 101-110 BPM  Abdomen soft and nontender  Strength 5/5 in all extremities  Will obtain CBC, CMP, COVID/Flu/RSV, D dimer, Troponin, EKG  D-Dimer elevated; CTA ordered  CTA unremarkable  HEART score 1  Work up otherwise unremarkable  Patient's tachycardia has resolved  At the time of discharge, the patient is in no acute distress   I discussed with the patient the diagnosis, treatment plan, follow-up, return precautions, and discharge instructions; they were given the opportunity to ask questions and verbalized understanding  Amount and/or Complexity of Data Reviewed  Clinical lab tests: ordered and reviewed  Tests in the radiology section of CPT®: ordered and reviewed  Tests in the medicine section of CPT®: ordered and reviewed  Decide to obtain previous medical records or to obtain history from someone other than the patient: yes  Review and summarize past medical records: yes  Independent visualization of images, tracings, or specimens: yes    Risk of Complications, Morbidity, and/or Mortality  Presenting problems: moderate  Management options: low    Patient Progress  Patient progress: improved      Disposition  Final diagnoses:   Chest wall pain     Time reflects when diagnosis was documented in both MDM as applicable and the Disposition within this note     Time User Action Codes Description Comment    11/25/2022  9:56 PM Chelsy Scott [R07 [id number]] Chest wall pain       ED Disposition     ED Disposition   Discharge    Condition   Stable    Date/Time   Fri Nov 25, 2022  9:56 PM    Comment   Ric Alberto discharge to home/self care                 Follow-up Information     Follow up With Specialties Details Why [id number] [id number] [address]  [address] [facility]  [phone]            Discharge Medication List as of 11/25/2022 10:03 PM      CONTINUE these medications which have NOT CHANGED    Details   albuterol (ACCUNEB) 1 25 MG/3ML nebulizer solution Inhale 1 ampule every 6 (six) hours as needed, Historical Med      albuterol (Ventolin HFA) 90 mcg/act inhaler Inhale 2 puffs every 6 (six) hours as needed for wheezing, Starting Tue 4/6/2021, Normal      azelastine (ASTELIN) 0 1 % nasal spray instill 2 sprays into each nostril twice a day as directed, Normal      buPROPion (WELLBUTRIN XL) 150 mg 24 hr tablet take 1 tablet by mouth every morning, Normal      Diclofenac Sodium (VOLTAREN) 1 % Apply 2 g topically 4 (four) times a day, Starting Sat 12/18/2021, Normal      EPINEPHrine (EPIPEN) 0 3 mg/0 3 mL SOAJ EpiPen 2-Jayme 0 3 MG/0 3ML Injection Solution Auto-injector  Use as directed, prn allergic reaction   Quantity: 1;  Refills: 1      Quincy Ignacio DO ;  Start 31-July-2014  Active  2 Solution Auto-injector Pen, Historical Med      famotidine (PEPCID) 20 mg tablet Take 1 tablet (20 mg total) by mouth 2 (two) times a day, Starting Fri 4/23/2021, Normal      fluticasone (FLONASE) 50 mcg/act nasal spray 1 spray into each nostril daily, Starting Mon 7/12/2021, Normal      fluticasone (FLOVENT HFA) 110 MCG/ACT inhaler Inhale 2 puffs 2 (two) times a day Rinse mouth after use , Starting Tue 4/6/2021, Normal      meclizine (ANTIVERT) 25 mg tablet Take 1 tablet (25 mg total) by mouth 3 (three) times a day as needed for dizziness, Starting Sun 8/7/2022, Normal      montelukast (SINGULAIR) 10 mg tablet Take 1 tablet (10 mg total) by mouth daily at bedtime, Starting Fri 3/5/2021, Normal      naproxen (NAPROSYN) 500 mg tablet Take 1 tablet (500 mg total) by mouth every 12 (twelve) hours as needed for mild pain, moderate pain or headaches, Starting Mon 10/3/2022, Normal      omeprazole (PriLOSEC) 40 MG capsule take 1 capsule by mouth every evening, Normal      valsartan (DIOVAN) 80 mg tablet take 1 tablet by mouth once daily, Normal             No discharge procedures on file      PDMP Review     None          ED Provider  Electronically Signed by           Zoila Chi PA-C  11/26/22 0012

## 2022-11-26 NOTE — DISCHARGE INSTRUCTIONS
Follow up with your PCP in 1-2 days for your symptoms    Return for chest pain, trouble breathing, leg swelling, coughing up blood, passing out, vision changes, numbness/tingling, or any other new/concerning symptoms     You may take Tylenol 500 to 1000 mg three times a day as needed  Do not exceed 3000 mg of tylenol a day as this can cause liver damage  You may also take an non steroidal anti-inflammatory (NSAID) such as Motrin, ibuprofen or Advil, 400mg every 6 hours

## 2022-12-12 ENCOUNTER — OFFICE VISIT (OUTPATIENT)
Dept: FAMILY MEDICINE CLINIC | Facility: CLINIC | Age: 22
End: 2022-12-12

## 2022-12-12 ENCOUNTER — TELEPHONE (OUTPATIENT)
Dept: FAMILY MEDICINE CLINIC | Facility: CLINIC | Age: 22
End: 2022-12-12

## 2022-12-12 VITALS
HEART RATE: 79 BPM | DIASTOLIC BLOOD PRESSURE: 76 MMHG | HEIGHT: 69 IN | SYSTOLIC BLOOD PRESSURE: 126 MMHG | RESPIRATION RATE: 16 BRPM | TEMPERATURE: 97.9 F | OXYGEN SATURATION: 96 % | WEIGHT: 184 LBS | BODY MASS INDEX: 27.25 KG/M2

## 2022-12-12 DIAGNOSIS — J40 BRONCHITIS: Primary | ICD-10-CM

## 2022-12-12 DIAGNOSIS — J45.909 MILD ASTHMA WITHOUT COMPLICATION, UNSPECIFIED WHETHER PERSISTENT: ICD-10-CM

## 2022-12-12 DIAGNOSIS — I10 ESSENTIAL HYPERTENSION: ICD-10-CM

## 2022-12-12 DIAGNOSIS — F17.210 CIGARETTE NICOTINE DEPENDENCE WITHOUT COMPLICATION: ICD-10-CM

## 2022-12-12 RX ORDER — ALBUTEROL SULFATE 2.5 MG/3ML
2.5 SOLUTION RESPIRATORY (INHALATION) EVERY 6 HOURS PRN
Qty: 180 ML | Refills: 5 | Status: SHIPPED | OUTPATIENT
Start: 2022-12-12

## 2022-12-12 RX ORDER — MONTELUKAST SODIUM 10 MG/1
10 TABLET ORAL
Qty: 30 TABLET | Refills: 5 | Status: SHIPPED | OUTPATIENT
Start: 2022-12-12

## 2022-12-12 RX ORDER — VALSARTAN 80 MG/1
80 TABLET ORAL DAILY
Qty: 90 TABLET | Refills: 0 | Status: SHIPPED | OUTPATIENT
Start: 2022-12-12

## 2022-12-12 RX ORDER — PREDNISONE 10 MG/1
TABLET ORAL
Qty: 33 TABLET | Refills: 0 | Status: SHIPPED | OUTPATIENT
Start: 2022-12-12

## 2022-12-12 RX ORDER — DOXYCYCLINE HYCLATE 100 MG
100 TABLET ORAL 2 TIMES DAILY
Qty: 14 TABLET | Refills: 0 | Status: SHIPPED | OUTPATIENT
Start: 2022-12-12 | End: 2022-12-19

## 2022-12-12 RX ORDER — VARENICLINE TARTRATE 1 MG/1
1 TABLET, FILM COATED ORAL 2 TIMES DAILY
Qty: 60 TABLET | Refills: 2 | Status: SHIPPED | OUTPATIENT
Start: 2022-12-12

## 2022-12-12 NOTE — TELEPHONE ENCOUNTER
Received a call from the patient stating that he went to [facility] to get his nebulizer  They told him to have the script sent to [facility] equipment  He was asking if we could do that

## 2022-12-13 NOTE — TELEPHONE ENCOUNTER
Spoke with patient and faxing scripts over to [facility] equipment in [place] number [phone]

## 2022-12-13 NOTE — PROGRESS NOTES
Assessment/Plan: Hypertension is under good control  Consider prednisone for asthma flare  Side effect profile of antibiotics reviewed  He will call with any fevers or any new or worsening symptoms  Time spent counseling and reviewing treatment plan and coordinating care as well as documentation was 30 minutes  1  Bronchitis  -     doxycycline hyclate (VIBRA-TABS) 100 mg tablet; Take 1 tablet (100 mg total) by mouth 2 (two) times a day for 7 days    2  Essential hypertension  -     valsartan (DIOVAN) 80 mg tablet; Take 1 tablet (80 mg total) by mouth daily    3  Mild asthma without complication, unspecified whether persistent  -     montelukast (SINGULAIR) 10 mg tablet; Take 1 tablet (10 mg total) by mouth daily at bedtime  -     predniSONE 10 mg tablet; 6 tablets daily, all at one time, with food  Then on day #4 decrease by 1 pill each day until finished  -     Nebulizer  -     albuterol (2 5 mg/3 mL) 0 083 % nebulizer solution; Take 3 mL (2 5 mg total) by nebulization every 6 (six) hours as needed for wheezing or shortness of breath  -     Nebulizer Supplies    4  Cigarette nicotine dependence without complication  -     varenicline (CHANTIX) 1 mg tablet; Take 1 tablet (1 mg total) by mouth 2 (two) times a day For smoking          Subjective:      Patient ID: Igor Fofana is a 23 y o  male  Patient is here for recheck on chronic conditions  Hypertension is currently under good control  He does have some congestion and chronic cough  He has history of mild to moderate asthma without fever  No GI complaints  Cough  This is a recurrent problem  The current episode started more than 1 month ago  The problem has been unchanged  The problem occurs every few minutes  The cough is productive of sputum  Associated symptoms include chest pain, ear congestion, ear pain, myalgias, nasal congestion, postnasal drip, rhinorrhea, a sore throat, shortness of breath and wheezing   Pertinent negatives include no chills, fever, headaches, heartburn, hemoptysis, rash, sweats or weight loss  The symptoms are aggravated by cold air, dust and exercise  The following portions of the patient's history were reviewed and updated as appropriate: allergies, current medications, past family history, past medical history, past social history, past surgical history, and problem list     Review of Systems   Constitutional: Negative  Negative for chills, fever and weight loss  HENT: Positive for ear pain, postnasal drip, rhinorrhea and sore throat  Eyes: Negative  Respiratory: Positive for cough, shortness of breath and wheezing  Negative for hemoptysis  Cardiovascular: Positive for chest pain  Gastrointestinal: Negative  Negative for heartburn  Endocrine: Negative  Genitourinary: Negative  Musculoskeletal: Positive for myalgias  Skin: Negative  Negative for rash  Allergic/Immunologic: Negative  Neurological: Negative  Negative for headaches  Hematological: Negative  Psychiatric/Behavioral: Negative  Objective:      /76 (BP Location: Left arm, Patient Position: Sitting, Cuff Size: Standard)   Pulse 79   Temp 97 9 °F (36 6 °C) (Temporal)   Resp 16   Ht 5' 9" (1 753 m)   Wt 83 5 kg (184 lb)   SpO2 96%   BMI 27 17 kg/m²          Physical Exam  Vitals reviewed  Constitutional:       Appearance: He is well-developed  HENT:      Head: Normocephalic and atraumatic  Right Ear: External ear normal  Tympanic membrane is not erythematous or bulging  Left Ear: External ear normal  Tympanic membrane is not erythematous or bulging  Nose: Nose normal       Mouth/Throat:      Mouth: No oral lesions  Pharynx: No oropharyngeal exudate  Eyes:      General: No scleral icterus  Right eye: No discharge  Left eye: No discharge  Conjunctiva/sclera: Conjunctivae normal    Neck:      Thyroid: No thyromegaly     Cardiovascular:      Rate and Rhythm: Normal rate and regular rhythm  Heart sounds: Normal heart sounds  No murmur heard  No friction rub  No gallop  Pulmonary:      Effort: Pulmonary effort is normal  No respiratory distress  Breath sounds: No wheezing or rales  Chest:      Chest wall: No tenderness  Abdominal:      General: Bowel sounds are normal  There is no distension  Palpations: Abdomen is soft  There is no mass  Tenderness: There is no abdominal tenderness  There is no guarding or rebound  Musculoskeletal:         General: No tenderness or deformity  Normal range of motion  Cervical back: Normal range of motion and neck supple  Lymphadenopathy:      Cervical: No cervical adenopathy  Skin:     General: Skin is warm and dry  Coloration: Skin is not pale  Findings: No erythema or rash  Neurological:      Mental Status: He is alert and oriented to person, place, and time  Cranial Nerves: No cranial nerve deficit  Motor: No abnormal muscle tone  Coordination: Coordination normal       Deep Tendon Reflexes: Reflexes are normal and symmetric     Psychiatric:         Behavior: Behavior normal

## 2022-12-25 DIAGNOSIS — K21.9 GASTROESOPHAGEAL REFLUX DISEASE WITHOUT ESOPHAGITIS: ICD-10-CM

## 2022-12-25 RX ORDER — OMEPRAZOLE 40 MG/1
CAPSULE, DELAYED RELEASE ORAL
Qty: 30 CAPSULE | Refills: 3 | Status: SHIPPED | OUTPATIENT
Start: 2022-12-25

## 2023-04-23 DIAGNOSIS — F41.1 GENERALIZED ANXIETY DISORDER: ICD-10-CM

## 2023-04-23 DIAGNOSIS — F33.1 MODERATE EPISODE OF RECURRENT MAJOR DEPRESSIVE DISORDER (HCC): ICD-10-CM

## 2023-04-23 RX ORDER — BUPROPION HYDROCHLORIDE 150 MG/1
TABLET ORAL
Qty: 90 TABLET | Refills: 3 | Status: SHIPPED | OUTPATIENT
Start: 2023-04-23

## 2023-05-01 DIAGNOSIS — K21.9 GASTROESOPHAGEAL REFLUX DISEASE WITHOUT ESOPHAGITIS: ICD-10-CM

## 2023-05-01 RX ORDER — OMEPRAZOLE 40 MG/1
CAPSULE, DELAYED RELEASE ORAL
Qty: 30 CAPSULE | Refills: 3 | Status: SHIPPED | OUTPATIENT
Start: 2023-05-01

## 2023-05-17 NOTE — TELEPHONE ENCOUNTER
Can we prescribe something else for pt? Sees Dr Riley Salazar     Thank you There are no preventive care reminders to display for this patient.    Patient is up to date, no discussion needed.

## 2023-07-11 ENCOUNTER — OFFICE VISIT (OUTPATIENT)
Age: 23
End: 2023-07-11
Payer: COMMERCIAL

## 2023-07-11 VITALS
OXYGEN SATURATION: 98 % | TEMPERATURE: 98 F | HEART RATE: 99 BPM | SYSTOLIC BLOOD PRESSURE: 160 MMHG | BODY MASS INDEX: 29.98 KG/M2 | RESPIRATION RATE: 18 BRPM | WEIGHT: 202.38 LBS | HEIGHT: 69 IN | DIASTOLIC BLOOD PRESSURE: 86 MMHG

## 2023-07-11 DIAGNOSIS — R00.0 RAPID HEART BEAT: Primary | ICD-10-CM

## 2023-07-11 DIAGNOSIS — R07.9 CHEST PAIN, UNSPECIFIED TYPE: ICD-10-CM

## 2023-07-11 PROCEDURE — 93005 ELECTROCARDIOGRAM TRACING: CPT | Performed by: PHYSICIAN ASSISTANT

## 2023-07-11 PROCEDURE — 99213 OFFICE O/P EST LOW 20 MIN: CPT | Performed by: PHYSICIAN ASSISTANT

## 2023-07-11 NOTE — PATIENT INSTRUCTIONS
Chest Pain   WHAT YOU NEED TO KNOW:   Chest pain can be caused by a range of conditions, from not serious to life-threatening. Chest pain can be a symptom of a digestive problem, such as acid reflux or a stomach ulcer. An anxiety attack or a strong emotion, such as anger, can also cause chest pain. Infection, inflammation, or a fracture in the bones or cartilage in your chest can cause pain or discomfort. Sometimes chest pain or pressure is caused by poor blood flow to your heart (angina). Chest pain may also be caused by life-threatening conditions such as a heart attack or blood clot in your lungs. DISCHARGE INSTRUCTIONS:   Call your local emergency number (911 in the [address]) or have someone call if:   You have any of the following signs of a heart attack:      Squeezing, pressure, or pain in your chest    You may  also have any of the following:     Discomfort or pain in your back, neck, jaw, stomach, or arm    Shortness of breath    Nausea or vomiting    Lightheadedness or a sudden cold sweat      Return to the emergency department if:   You have chest discomfort that gets worse, even with medicine. You cough or vomit blood. Your bowel movements are black or bloody. You cannot stop vomiting, or it hurts to swallow. Call your doctor if:   You have questions or concerns about your condition or care. Medicines:   Medicines  may be given to treat the cause of your chest pain. Examples include pain medicine, anxiety medicine, or medicines to increase blood flow to your heart. Do not take certain medicines without asking your healthcare provider first.  These include NSAIDs, herbal or vitamin supplements, and hormones, such as estrogen or progestin. Take your medicine as directed. Contact your healthcare provider if you think your medicine is not helping or if you have side effects. Tell your provider if you are allergic to any medicine. Keep a list of the medicines, vitamins, and herbs you take. Include the amounts, and when and why you take them. Bring the list or the pill bottles to follow-up visits. Carry your medicine list with you in case of an emergency. Healthy living tips: If the cause of your chest pain is known, your healthcare provider will give you specific guidelines to follow. The following are general healthy guidelines:  Do not smoke. Nicotine and other chemicals in cigarettes and cigars can cause lung and heart damage. Ask your healthcare provider for information if you currently smoke and need help to quit. E-cigarettes or smokeless tobacco still contain nicotine. Talk to your healthcare provider before you use these products. Choose a variety of healthy foods as often as possible. Include fresh, frozen, or canned fruits and vegetables. Also include low-fat dairy products, fish, chicken (without skin), and lean meats. Your healthcare provider or a dietitian can help you create meal plans. You may need to avoid certain foods or drinks if your pain is caused by a digestion problem. Lower your sodium (salt) intake. Limit foods that are high in sodium, such as canned foods, salty snacks, and cold cuts. If you add salt when you cook food, do not add more at the table. Choose low-sodium canned foods as much as possible. Drink plenty of water every day. Water helps your body to control your temperature and blood pressure. Ask your healthcare provider how much water you should drink every day. Ask about activity. Your healthcare provider will tell you which activities to limit or avoid. Ask when you can drive, return to work, and have sex. Ask about the best exercise plan for you. Maintain a healthy weight. Ask your healthcare provider what a healthy weight is for you. Ask him or her to help you create a safe weight loss plan if you are overweight. Ask about vaccines you may need.   Your healthcare provider can tell you which vaccines you need, and when to get them. The following vaccines help prevent diseases that can become serious for a person with a heart condition:    The influenza (flu) vaccine is given each year. Get a flu vaccine as soon as recommended, usually in September or October. The pneumonia vaccine is usually given every 5 years. Your healthcare provider may recommend the pneumonia vaccine if you are 72 or older. COVID-19 vaccines are given to adults as a shot in 1 or 2 doses. Vaccination is recommended for all adults. A booster (additional) dose is also recommended for all adults. A second booster is recommended for all adults 48 or older and for immunocompromised adults 25 or older. The second booster is also recommended for adults who received the 1-dose vaccine for the first and booster doses. Your healthcare provider can tell you when to get one or both boosters. Follow up with your doctor within 72 hours, or as directed: You may need to return for more tests to find the cause of your chest pain. You may be referred to a specialist, such as a cardiologist or gastroenterologist. Write down your questions so you remember to ask them during your visits. © Copyright [employer]ar[employer] 2022 Information is for End User's use only and may not be sold, redistributed or otherwise used for commercial purposes. The above information is an  only. It is not intended as medical advice for individual conditions or treatments. Talk to your doctor, nurse or pharmacist before following any medical regimen to see if it is safe and effective for you. Heart Palpitations   WHAT YOU NEED TO KNOW:   Heart palpitations are feelings that your heart races, jumps, throbs, or flutters. You may feel extra beats, no beats for a short time, or skipped beats. You may have these feelings in your chest, throat, or neck. They may happen when you are sitting, standing, or lying.  Heart palpitations may be frightening, but are usually not caused by a serious problem. DISCHARGE INSTRUCTIONS:   Call 911 or have someone else call for any of the following: You have any of the following signs of a heart attack:      Squeezing, pressure, or pain in your chest    You may  also have any of the following:     Discomfort or pain in your back, neck, jaw, stomach, or arm    Shortness of breath    Nausea or vomiting    Lightheadedness or a sudden cold sweat    You have any of the following signs of a stroke:      Numbness or drooping on one side of your face     Weakness in an arm or leg    Confusion or difficulty speaking    Dizziness, a severe headache, or vision loss    You faint or lose consciousness. Return to the emergency department if:   Your palpitations happen more often or get more intense. Contact your healthcare provider if:   You have new or worsening swelling in your feet or ankles. You have questions or concerns about your condition or care. Follow up with your healthcare provider as directed: You may need to follow up with a cardiologist. Kendall Lute may need tests to check for heart problems that cause palpitations. Write down your questions so you remember to ask them during your visits. Keep a record:  Write down when your palpitations start and stop, what you were doing when they started, and your symptoms. Keep track of what you ate or drank within a few hours of your palpitations. Include anything that seemed to help your symptoms, such as lying down or holding your breath. This record will help you and your healthcare provider learn what triggers your palpitations. Bring this record with you to your follow up visits. Help prevent heart palpitations:   Manage stress and anxiety. Find ways to relax such as listening to music, meditating, or doing yoga. Exercise can also help decrease stress and anxiety. Talk to someone you trust about your stress or anxiety. You can also talk to a therapist.     Get plenty of sleep every night.   Ask your healthcare provider how much sleep you need each night. Do not drink caffeine or alcohol. Caffeine and alcohol can make your palpitations worse. Caffeine is found in soda, coffee, tea, chocolate, and drinks that increase your energy. Do not smoke. Nicotine and other chemicals in cigarettes and cigars may damage your heart and blood vessels. Ask your healthcare provider for information if you currently smoke and need help to quit. E-cigarettes or smokeless tobacco still contain nicotine. Talk to your healthcare provider before you use these products. Do not use illegal drugs. Talk to your healthcare provider if you use illegal drugs and want help to quit. © Copyright Kimmie Baer 2022 Information is for End User's use only and may not be sold, redistributed or otherwise used for commercial purposes. The above information is an  only. It is not intended as medical advice for individual conditions or treatments. Talk to your doctor, nurse or pharmacist before following any medical regimen to see if it is safe and effective for you.

## 2023-07-12 LAB
ATRIAL RATE: 90 BPM
P AXIS: 27 DEGREES
PR INTERVAL: 140 MS
QRS AXIS: 6 DEGREES
QRSD INTERVAL: 92 MS
QT INTERVAL: 348 MS
QTC INTERVAL: 425 MS
T WAVE AXIS: 34 DEGREES
VENTRICULAR RATE: 90 BPM

## 2023-07-12 PROCEDURE — 93010 ELECTROCARDIOGRAM REPORT: CPT | Performed by: INTERNAL MEDICINE

## 2023-07-17 NOTE — PROGRESS NOTES
[facility]        NAME: Carrington Angelucci is a 23 y.o. male  : 2000    MRN: [id number]  DATE: 2023  TIME: 10:27 AM    Assessment and Plan   Rapid heart beat [R00.0]  1. Rapid heart beat  ECG 12 lead      2. Chest pain, unspecified type              Patient Instructions     Pt has been experiencing intermittent CP and palpitations x 1 wk. His exam is benign today and EKG is normal. I rec close observation, limiting caffeine, and following up with PCP for further workup if issues persist/worsen for further workup, sooner to ER if they worsen. Also rec monitoring his BP as it is elevated today. He voiced understanding and agreed with plan. Follow up with PCP in 3-5 days. Proceed to  ER if symptoms worsen. Chief Complaint     Chief Complaint   Patient presents with   • Rapid Heart Rate     Started one week ago. No changes in medication, no extra stress. No changes in vision. Had this happen before and that is when he was diagnosed with hypertension, does not check BP at home. History of Present Illness       Pt presents with 1 wk hx of sharp substernal chest pain that occurs intermittently and is sometimes accompanied by rapid heart rate/palpitations. He reports it can occur at rest or with activity. He has HTN and is on medication for it. Denies recent injury/trauma or change in level/type of physical activity. Denies recent increase in stressors or any anxiety. Denies SOB or any other significant symptoms. Review of Systems   Review of Systems   Constitutional: Negative. Respiratory: Negative. Cardiovascular: Positive for chest pain and palpitations. Gastrointestinal: Negative. Genitourinary: Negative. Musculoskeletal: Negative. Psychiatric/Behavioral: Negative.           Current Medications       Current Outpatient Medications:   •  albuterol (ACCUNEB) 1.25 MG/3ML nebulizer solution, Inhale 1 ampule every 6 (six) hours as needed, Disp: , Rfl:   •  albuterol (Ventolin HFA) 90 mcg/act inhaler, Inhale 2 puffs every 6 (six) hours as needed for wheezing, Disp: 2 Inhaler, Rfl: 0  •  buPROPion (WELLBUTRIN XL) 150 mg 24 hr tablet, take 1 tablet by mouth every morning, Disp: 90 tablet, Rfl: 3  •  omeprazole (PriLOSEC) 40 MG capsule, take 1 capsule by mouth every evening, Disp: 30 capsule, Rfl: 3  •  valsartan (DIOVAN) 80 mg tablet, Take 1 tablet (80 mg total) by mouth daily, Disp: 90 tablet, Rfl: 0  •  albuterol (2.5 mg/3 mL) 0.083 % nebulizer solution, Take 3 mL (2.5 mg total) by nebulization every 6 (six) hours as needed for wheezing or shortness of breath, Disp: 180 mL, Rfl: 5  •  azelastine (ASTELIN) 0.1 % nasal spray, instill 2 sprays into each nostril twice a day as directed (Patient not taking: Reported on 7/11/2023), Disp: 30 mL, Rfl: 1  •  Diclofenac Sodium (VOLTAREN) 1 %, Apply 2 g topically 4 (four) times a day (Patient not taking: Reported on 1/19/2022), Disp: 150 g, Rfl: 0  •  EPINEPHrine (EPIPEN) 0.3 mg/0.3 mL SOAJ, EpiPen 2-Jayme 0.3 MG/0.3ML Injection Solution Auto-injector Use as directed, prn allergic reaction  Quantity: 1;  Refills: 1   Quincy Ignacio DO ;  Start 31-July-2014 Active 2 Solution Auto-injector Pen, Disp: , Rfl:   •  famotidine (PEPCID) 20 mg tablet, Take 1 tablet (20 mg total) by mouth 2 (two) times a day (Patient not taking: Reported on 8/7/2022), Disp: 30 tablet, Rfl: 0  •  fluticasone (FLONASE) 50 mcg/act nasal spray, 1 spray into each nostril daily (Patient not taking: Reported on 7/11/2023), Disp: 16 g, Rfl: 0  •  fluticasone (FLOVENT HFA) 110 MCG/ACT inhaler, Inhale 2 puffs 2 (two) times a day Rinse mouth after use.  (Patient not taking: Reported on 7/11/2023), Disp: 1 Inhaler, Rfl: 0  •  meclizine (ANTIVERT) 25 mg tablet, Take 1 tablet (25 mg total) by mouth 3 (three) times a day as needed for dizziness, Disp: 30 tablet, Rfl: 0  •  montelukast (SINGULAIR) 10 mg tablet, Take 1 tablet (10 mg total) by mouth daily at bedtime (Patient not taking: Reported on 7/11/2023), Disp: 30 tablet, Rfl: 5  •  naproxen (NAPROSYN) 500 mg tablet, Take 1 tablet (500 mg total) by mouth every 12 (twelve) hours as needed for mild pain, moderate pain or headaches (Patient not taking: Reported on 12/12/2022), Disp: 15 tablet, Rfl: 0  •  predniSONE 10 mg tablet, 6 tablets daily, all at one time, with food. Then on day #4 decrease by 1 pill each day until finished. (Patient not taking: Reported on 7/11/2023), Disp: 33 tablet, Rfl: 0  •  varenicline (CHANTIX) 1 mg tablet, Take 1 tablet (1 mg total) by mouth 2 (two) times a day For smoking (Patient not taking: Reported on 7/11/2023), Disp: 60 tablet, Rfl: 2    Current Allergies     Allergies as of 07/11/2023 - Reviewed 07/11/2023   Allergen Reaction Noted   • Nuts - food allergy Anaphylaxis    • Other Anaphylaxis 08/06/2017   • Shellfish-derived products - food allergy Anaphylaxis 08/06/2017   • Latex Itching 10/03/2022   • Peanut oil - food allergy  05/03/2017   • Penicillins  10/30/2020            The following portions of the patient's history were reviewed and updated as appropriate: allergies, current medications, past family history, past medical history, past social history, past surgical history and problem list.     Past Medical History:   Diagnosis Date   • Asthma    • Asthma due to seasonal allergies    • GERD (gastroesophageal reflux disease)    • Hypertension        Past Surgical History:   Procedure Laterality Date   • COLONOSCOPY     • NO PAST SURGERIES     • UPPER GASTROINTESTINAL ENDOSCOPY     • WISDOM TOOTH EXTRACTION         Family History   Problem Relation Age of Onset   • No Known Problems Mother    • No Known Problems Father          Medications have been verified. Objective   /86   Pulse 99   Temp 98 °F (36.7 °C)   Resp 18   Ht 5' 9" (1.753 m)   Wt 91.8 kg (202 lb 6.1 oz)   SpO2 98%   BMI 29.89 kg/m²   No LMP for male patient.        Physical Exam     Physical Exam  Vitals reviewed. Constitutional:       General: He is not in acute distress. Appearance: He is well-developed. HENT:      Mouth/Throat:      Mouth: Mucous membranes are moist.      Pharynx: Oropharynx is clear. Cardiovascular:      Rate and Rhythm: Normal rate and regular rhythm. Heart sounds: Normal heart sounds. No murmur heard. Pulmonary:      Effort: Pulmonary effort is normal. No respiratory distress. Breath sounds: Normal breath sounds. Musculoskeletal:         General: No tenderness or deformity. Cervical back: Neck supple. Right lower leg: No edema. Left lower leg: No edema. Lymphadenopathy:      Cervical: No cervical adenopathy. Neurological:      Mental Status: He is alert and oriented to person, place, and time.

## 2023-08-08 ENCOUNTER — OFFICE VISIT (OUTPATIENT)
Age: 23
End: 2023-08-08
Payer: COMMERCIAL

## 2023-08-08 VITALS
DIASTOLIC BLOOD PRESSURE: 84 MMHG | RESPIRATION RATE: 20 BRPM | TEMPERATURE: 98.2 F | OXYGEN SATURATION: 97 % | HEIGHT: 69 IN | WEIGHT: 202.16 LBS | HEART RATE: 107 BPM | SYSTOLIC BLOOD PRESSURE: 134 MMHG | BODY MASS INDEX: 29.94 KG/M2

## 2023-08-08 DIAGNOSIS — K58.9 IRRITABLE BOWEL SYNDROME, UNSPECIFIED TYPE: ICD-10-CM

## 2023-08-08 DIAGNOSIS — R10.11 RUQ PAIN: ICD-10-CM

## 2023-08-08 DIAGNOSIS — R11.0 NAUSEA: Primary | ICD-10-CM

## 2023-08-08 PROBLEM — R10.13 DYSPEPSIA: Status: RESOLVED | Noted: 2020-10-30 | Resolved: 2023-08-08

## 2023-08-08 PROBLEM — K21.9 GERD (GASTROESOPHAGEAL REFLUX DISEASE): Status: ACTIVE | Noted: 2023-08-08

## 2023-08-08 PROCEDURE — 99213 OFFICE O/P EST LOW 20 MIN: CPT | Performed by: PHYSICIAN ASSISTANT

## 2023-08-08 RX ORDER — DICYCLOMINE HCL 20 MG
20 TABLET ORAL EVERY 6 HOURS PRN
Qty: 20 TABLET | Refills: 0 | Status: SHIPPED | OUTPATIENT
Start: 2023-08-08 | End: 2023-08-09

## 2023-08-08 RX ORDER — ONDANSETRON 4 MG/1
4 TABLET, ORALLY DISINTEGRATING ORAL EVERY 6 HOURS PRN
Qty: 20 TABLET | Refills: 0 | Status: SHIPPED | OUTPATIENT
Start: 2023-08-08 | End: 2023-08-09

## 2023-08-08 NOTE — PROGRESS NOTES
ADULT ANNUAL [facility] PRIMARY CARE    NAME: Jeana Aguilar  AGE: 23 y.o. SEX: male  : 2000     DATE: 2023     Assessment and Plan:     Patient presents today for an annual physical.  The patient has multiple complaints ranging from generalized joint pain to palpitations (SEE ROS). Patient reports "I have had the symptoms for years but they have been worsening over the past few months". Patient reports that his symptoms are worse at rest and improved with activity. Patient has tried home remedies such as hot teas with cumin, no relief. Physical exam benign. I encouraged the patient to follow-up with GI due to his reported GI symptoms. Will start workup with baseline blood work. Etiology is very unclear at this point. F/U TBD pending lab results    Problem List Items Addressed This Visit        Digestive    Fatty liver     Noted in  on imaging without elevation of LFTs. Will recheck metabolic panel         Relevant Orders    Comprehensive metabolic panel    GERD (gastroesophageal reflux disease)     Follows with GI, Dr. Sharyle Crease. Encouraged f/u due to recent GI symptoms. Cardiovascular and Mediastinum    Essential hypertension     BP well controlled, will continue to monitor. Takes Valsartan for BP control         Relevant Orders    Comprehensive metabolic panel    TSH, 3rd generation with Free T4 reflex    UA w Reflex to Microscopic w Reflex to Culture -Lab Collect       Other    Generalized anxiety disorder     Currently on bupropion 150 mg daily. Patient reports "it helps but not as much as it used to".   Will consider increasing at next follow-up visit         Relevant Orders    TSH, 3rd generation with Free T4 reflex    Fatigue    Relevant Orders    CBC and differential    Comprehensive metabolic panel    Hemoglobin A1C    TSH, 3rd generation with Free T4 reflex    Lyme Disease Serology w/Reflex    UA w Reflex to Microscopic w Reflex to Culture -Lab Collect    Generalized joint pain    Relevant Orders    CBC and differential    Comprehensive metabolic panel    TSH, 3rd generation with Free T4 reflex    Lyme Disease Serology w/Reflex    UA w Reflex to Microscopic w Reflex to Culture -Lab Collect   Other Visit Diagnoses     Annual physical exam    -  Primary    Relevant Orders    CBC and differential    Comprehensive metabolic panel    Lipid Panel with Direct LDL reflex    Hemoglobin A1C    TSH, 3rd generation with Free T4 reflex    Encounter for screening for diabetes mellitus        Relevant Orders    Hemoglobin A1C    Lipid screening        Relevant Orders    Lipid Panel with Direct LDL reflex    Screening for HIV (human immunodeficiency virus)        Relevant Orders    HIV 1/2 AG/AB w Reflex [facility] for 2 yr old and above    Need for hepatitis C screening test        Relevant Orders    Hepatitis C antibody          Immunizations and preventive care screenings were discussed with patient today. Appropriate education was printed on patient's after visit summary. Counseling:  Exercise: the importance of regular exercise/physical activity was discussed. Recommend exercise 3-5 times per week for at least 30 minutes. BMI Counseling: Body mass index is 30.05 kg/m². The BMI is above normal. Nutrition recommendations include encouraging healthy choices of fruits and vegetables and consuming healthier snacks. Exercise recommendations include exercising 3-5 times per week. Rationale for BMI follow-up plan is due to patient being overweight or obese. Return for TBD pending lab results. Chief Complaint:     Chief Complaint   Patient presents with   • Establish Care      History of Present Illness:     Adult Annual Physical   Patient here for a comprehensive physical exam. The patient reports problems - fatigue, abdominal pain, joint pain. Diet and Physical Activity  Diet/Nutrition: well balanced diet.    Exercise: no formal exercise. Depression Screening  PHQ-2/9 Depression Screening         General Health  Sleep: sleeps well. Hearing: normal - bilateral.  Vision: wears glasses. Dental: regular dental visits.  Health  History of STDs?: no.     Review of Systems:     Review of Systems   Constitutional: Positive for fatigue. Negative for chills and fever. "feel shaky"   HENT: Negative. Negative for congestion, sinus pressure, sinus pain and sore throat. Eyes: Negative. Respiratory: Negative. Cardiovascular: Positive for palpitations. Gastrointestinal: Positive for abdominal pain and nausea. Negative for diarrhea. Greasy stools  Abd pain: LUQ and RLQ   Endocrine: Negative. Genitourinary: Negative. Musculoskeletal: Positive for arthralgias. "random joint pain"  "L pectoral pain"   Skin: Negative. Allergic/Immunologic: Negative. Neurological: Positive for dizziness, weakness, light-headedness and headaches. Hematological: Positive for adenopathy. Swollen cervical lymph nodes   Psychiatric/Behavioral: Positive for agitation.  Negative for sleep disturbance.        "quick to snap"      Past Medical History:     Past Medical History:   Diagnosis Date   • Allergic    • Anxiety    • Arthritis    • Asthma    • Asthma due to seasonal allergies    • GERD (gastroesophageal reflux disease)    • Headache(784.0)    • HL (hearing loss)    • Hypertension    • Memory loss       Past Surgical History:     Past Surgical History:   Procedure Laterality Date   • COLONOSCOPY     • NO PAST SURGERIES     • UPPER GASTROINTESTINAL ENDOSCOPY     • WISDOM TOOTH EXTRACTION        Social History:     Social History     Socioeconomic History   • Marital status: /Civil Union     Spouse name: None   • Number of children: None   • Years of education: None   • Highest education level: None   Occupational History   • None   Tobacco Use   • Smoking status: Former     Packs/day: 0.25 Types: Cigarettes     Quit date: 12/4/2019     Years since quitting: 3.6   • Smokeless tobacco: Never   • Tobacco comments:     vapes   Vaping Use   • Vaping Use: Every day   • Start date: 2/17/2014   • Substances: Nicotine, Flavoring   Substance and Sexual Activity   • Alcohol use: Not Currently     Comment: every few months   • Drug use: No   • Sexual activity: Yes   Other Topics Concern   • None   Social History Narrative   • None     Social Determinants of Health     Financial Resource Strain: Not on file   Food Insecurity: Not on file   Transportation Needs: Not on file   Physical Activity: Not on file   Stress: Not on file   Social Connections: Not on file   Intimate Partner Violence: Not on file   Housing Stability: Not on file      Family History:     Family History   Problem Relation Age of Onset   • No Known Problems Mother    • No Known Problems Father       Current Medications:     Current Outpatient Medications   Medication Sig Dispense Refill   • albuterol (2.5 mg/3 mL) 0.083 % nebulizer solution Take 3 mL (2.5 mg total) by nebulization every 6 (six) hours as needed for wheezing or shortness of breath 180 mL 5   • albuterol (ACCUNEB) 1.25 MG/3ML nebulizer solution Inhale 1 ampule every 6 (six) hours as needed     • albuterol (Ventolin HFA) 90 mcg/act inhaler Inhale 2 puffs every 6 (six) hours as needed for wheezing 2 Inhaler 0   • buPROPion (WELLBUTRIN XL) 150 mg 24 hr tablet take 1 tablet by mouth every morning 90 tablet 3   • EPINEPHrine (EPIPEN) 0.3 mg/0.3 mL SOAJ EpiPen 2-Jayme 0.3 MG/0.3ML Injection Solution Auto-injector  Use as directed, prn allergic reaction   Quantity: 1;  Refills: 1      Quincy Ignacio DO ;  Start 31-July-2014  Active  2 Solution Auto-injector Pen     • omeprazole (PriLOSEC) 40 MG capsule take 1 capsule by mouth every evening 30 capsule 3   • valsartan (DIOVAN) 80 mg tablet Take 1 tablet (80 mg total) by mouth daily 90 tablet 0     No current facility-administered medications for this visit. Allergies: Allergies   Allergen Reactions   • Nuts - Food Allergy Anaphylaxis   • Other Anaphylaxis     Tree nuts  Yogurt  Carrots  Limes   • Shellfish-Derived Products - Food Allergy Anaphylaxis   • Latex Itching   • Peanut Oil - Food Allergy    • Penicillins      Family allergy       Physical Exam:     /84 (BP Location: Left arm, Patient Position: Sitting, Cuff Size: Standard)   Pulse 84   Temp (!) 97.4 °F (36.3 °C) (Tympanic)   Resp 16   Ht 5' 9" (1.753 m)   Wt 92.3 kg (203 lb 7.8 oz)   SpO2 97%   BMI 30.05 kg/m²     Physical Exam  Vitals and nursing note reviewed. Constitutional:       Appearance: Normal appearance. He is well-developed. HENT:      Head: Normocephalic and atraumatic. Right Ear: Tympanic membrane normal. There is no impacted cerumen. Left Ear: Tympanic membrane normal. There is no impacted cerumen. Nose: Nose normal. No congestion. Mouth/Throat:      Mouth: Mucous membranes are moist.      Pharynx: Oropharynx is clear. No oropharyngeal exudate or posterior oropharyngeal erythema. Eyes:      Extraocular Movements: Extraocular movements intact. Conjunctiva/sclera: Conjunctivae normal.      Pupils: Pupils are equal, round, and reactive to light. Cardiovascular:      Rate and Rhythm: Normal rate and regular rhythm. Pulses: Normal pulses. Heart sounds: Normal heart sounds. No murmur heard. Pulmonary:      Effort: Pulmonary effort is normal. No respiratory distress. Breath sounds: Normal breath sounds. No wheezing. Abdominal:      General: Bowel sounds are normal.      Palpations: Abdomen is soft. Tenderness: There is no abdominal tenderness. Musculoskeletal:         General: No swelling, tenderness, deformity or signs of injury. Normal range of motion. Cervical back: Normal range of motion and neck supple. Right lower leg: No edema. Left lower leg: No edema.    Lymphadenopathy:      Cervical: No cervical adenopathy. Skin:     General: Skin is warm and dry. Capillary Refill: Capillary refill takes less than 2 seconds. Findings: No rash. Neurological:      General: No focal deficit present. Mental Status: He is alert and oriented to person, place, and time. Mental status is at baseline. Cranial Nerves: No cranial nerve deficit. Sensory: No sensory deficit. Motor: No weakness.       Coordination: Coordination normal.      Gait: Gait normal.      Deep Tendon Reflexes: Reflexes normal.   Psychiatric:         Mood and Affect: Mood normal.         Behavior: Behavior normal.          Randy Daniels, [address] [place] [address]

## 2023-08-08 NOTE — PROGRESS NOTES
[facility]        NAME: Magali Oden is a 23 y.o. male  : 2000    MRN: [id number]  DATE: 2023  TIME: 4:09 PM    Assessment and Plan   Nausea [R11.0]  1. Nausea  ondansetron (ZOFRAN-ODT) 4 mg disintegrating tablet      2. RUQ pain        3. Irritable bowel syndrome, unspecified type  dicyclomine (BENTYL) 20 mg tablet            Patient Instructions       Follow up with PCP in 3-5 days. Proceed to  ER if symptoms worsen.     Chief Complaint     Chief Complaint   Patient presents with   • Cold Like Symptoms     Fatigue and shaky x 2 weeks          History of Present Illness       HPI    Review of Systems   Review of Systems      Current Medications       Current Outpatient Medications:   •  albuterol (2.5 mg/3 mL) 0.083 % nebulizer solution, Take 3 mL (2.5 mg total) by nebulization every 6 (six) hours as needed for wheezing or shortness of breath, Disp: 180 mL, Rfl: 5  •  albuterol (ACCUNEB) 1.25 MG/3ML nebulizer solution, Inhale 1 ampule every 6 (six) hours as needed, Disp: , Rfl:   •  albuterol (Ventolin HFA) 90 mcg/act inhaler, Inhale 2 puffs every 6 (six) hours as needed for wheezing, Disp: 2 Inhaler, Rfl: 0  •  buPROPion (WELLBUTRIN XL) 150 mg 24 hr tablet, take 1 tablet by mouth every morning, Disp: 90 tablet, Rfl: 3  •  dicyclomine (BENTYL) 20 mg tablet, Take 1 tablet (20 mg total) by mouth every 6 (six) hours as needed (diarrhea, abdominal cramping), Disp: 20 tablet, Rfl: 0  •  EPINEPHrine (EPIPEN) 0.3 mg/0.3 mL SOAJ, EpiPen 2-Jayme 0.3 MG/0.3ML Injection Solution Auto-injector Use as directed, prn allergic reaction  Quantity: 1;  Refills: 1   Quincy Ignacio DO ;  Start  Active 2 Solution Auto-injector Pen, Disp: , Rfl:   •  omeprazole (PriLOSEC) 40 MG capsule, take 1 capsule by mouth every evening, Disp: 30 capsule, Rfl: 3  •  ondansetron (ZOFRAN-ODT) 4 mg disintegrating tablet, Take 1 tablet (4 mg total) by mouth every 6 (six) hours as needed for nausea, Disp: 20 tablet, Rfl: 0  •  valsartan (DIOVAN) 80 mg tablet, Take 1 tablet (80 mg total) by mouth daily, Disp: 90 tablet, Rfl: 0  •  azelastine (ASTELIN) 0.1 % nasal spray, instill 2 sprays into each nostril twice a day as directed (Patient not taking: Reported on 7/11/2023), Disp: 30 mL, Rfl: 1  •  Diclofenac Sodium (VOLTAREN) 1 %, Apply 2 g topically 4 (four) times a day (Patient not taking: Reported on 1/19/2022), Disp: 150 g, Rfl: 0  •  famotidine (PEPCID) 20 mg tablet, Take 1 tablet (20 mg total) by mouth 2 (two) times a day (Patient not taking: Reported on 8/7/2022), Disp: 30 tablet, Rfl: 0  •  fluticasone (FLONASE) 50 mcg/act nasal spray, 1 spray into each nostril daily (Patient not taking: Reported on 7/11/2023), Disp: 16 g, Rfl: 0  •  fluticasone (FLOVENT HFA) 110 MCG/ACT inhaler, Inhale 2 puffs 2 (two) times a day Rinse mouth after use. (Patient not taking: Reported on 7/11/2023), Disp: 1 Inhaler, Rfl: 0  •  meclizine (ANTIVERT) 25 mg tablet, Take 1 tablet (25 mg total) by mouth 3 (three) times a day as needed for dizziness, Disp: 30 tablet, Rfl: 0  •  montelukast (SINGULAIR) 10 mg tablet, Take 1 tablet (10 mg total) by mouth daily at bedtime (Patient not taking: Reported on 7/11/2023), Disp: 30 tablet, Rfl: 5  •  naproxen (NAPROSYN) 500 mg tablet, Take 1 tablet (500 mg total) by mouth every 12 (twelve) hours as needed for mild pain, moderate pain or headaches (Patient not taking: Reported on 12/12/2022), Disp: 15 tablet, Rfl: 0  •  predniSONE 10 mg tablet, 6 tablets daily, all at one time, with food. Then on day #4 decrease by 1 pill each day until finished.  (Patient not taking: Reported on 7/11/2023), Disp: 33 tablet, Rfl: 0  •  varenicline (CHANTIX) 1 mg tablet, Take 1 tablet (1 mg total) by mouth 2 (two) times a day For smoking (Patient not taking: Reported on 7/11/2023), Disp: 60 tablet, Rfl: 2    Current Allergies     Allergies as of 08/08/2023 - Reviewed 08/08/2023   Allergen Reaction Noted   • Nuts - food allergy Anaphylaxis    • Other Anaphylaxis 08/06/2017   • Shellfish-derived products - food allergy Anaphylaxis 08/06/2017   • Latex Itching 10/03/2022   • Peanut oil - food allergy  05/03/2017   • Penicillins  10/30/2020            The following portions of the patient's history were reviewed and updated as appropriate: allergies, current medications, past family history, past medical history, past social history, past surgical history and problem list.     Past Medical History:   Diagnosis Date   • Asthma    • Asthma due to seasonal allergies    • GERD (gastroesophageal reflux disease)    • Hypertension        Past Surgical History:   Procedure Laterality Date   • COLONOSCOPY     • NO PAST SURGERIES     • UPPER GASTROINTESTINAL ENDOSCOPY     • WISDOM TOOTH EXTRACTION         Family History   Problem Relation Age of Onset   • No Known Problems Mother    • No Known Problems Father          Medications have been verified. Objective   /84   Pulse (!) 107   Temp 98.2 °F (36.8 °C) (Tympanic)   Resp 20   Ht 5' 9" (1.753 m)   Wt 91.7 kg (202 lb 2.6 oz)   SpO2 97%   BMI 29.85 kg/m²   No LMP for male patient.        Physical Exam     Physical Exam

## 2023-08-09 ENCOUNTER — OFFICE VISIT (OUTPATIENT)
Age: 23
End: 2023-08-09
Payer: COMMERCIAL

## 2023-08-09 VITALS
HEART RATE: 84 BPM | BODY MASS INDEX: 30.14 KG/M2 | TEMPERATURE: 97.4 F | DIASTOLIC BLOOD PRESSURE: 84 MMHG | RESPIRATION RATE: 16 BRPM | OXYGEN SATURATION: 97 % | SYSTOLIC BLOOD PRESSURE: 128 MMHG | HEIGHT: 69 IN | WEIGHT: 203.48 LBS

## 2023-08-09 DIAGNOSIS — K21.9 GASTROESOPHAGEAL REFLUX DISEASE, UNSPECIFIED WHETHER ESOPHAGITIS PRESENT: ICD-10-CM

## 2023-08-09 DIAGNOSIS — M25.50 GENERALIZED JOINT PAIN: ICD-10-CM

## 2023-08-09 DIAGNOSIS — Z13.220 LIPID SCREENING: ICD-10-CM

## 2023-08-09 DIAGNOSIS — Z11.4 SCREENING FOR HIV (HUMAN IMMUNODEFICIENCY VIRUS): ICD-10-CM

## 2023-08-09 DIAGNOSIS — R53.83 FATIGUE, UNSPECIFIED TYPE: ICD-10-CM

## 2023-08-09 DIAGNOSIS — Z00.00 ANNUAL PHYSICAL EXAM: Primary | ICD-10-CM

## 2023-08-09 DIAGNOSIS — I10 ESSENTIAL HYPERTENSION: ICD-10-CM

## 2023-08-09 DIAGNOSIS — F41.1 GENERALIZED ANXIETY DISORDER: ICD-10-CM

## 2023-08-09 DIAGNOSIS — Z13.1 ENCOUNTER FOR SCREENING FOR DIABETES MELLITUS: ICD-10-CM

## 2023-08-09 DIAGNOSIS — K76.0 FATTY LIVER: ICD-10-CM

## 2023-08-09 DIAGNOSIS — Z11.59 NEED FOR HEPATITIS C SCREENING TEST: ICD-10-CM

## 2023-08-09 PROCEDURE — 99213 OFFICE O/P EST LOW 20 MIN: CPT | Performed by: NURSE PRACTITIONER

## 2023-08-09 PROCEDURE — 99385 PREV VISIT NEW AGE 18-39: CPT | Performed by: NURSE PRACTITIONER

## 2023-08-09 NOTE — ASSESSMENT & PLAN NOTE
Currently on bupropion 150 mg daily. Patient reports "it helps but not as much as it used to".   Will consider increasing at next follow-up visit

## 2023-08-09 NOTE — PATIENT INSTRUCTIONS
Call GI for f/u visit  - [facility] -[phone]    Please complete lab work fasting

## 2023-08-11 ENCOUNTER — APPOINTMENT (OUTPATIENT)
Age: 23
End: 2023-08-11
Payer: COMMERCIAL

## 2023-08-11 DIAGNOSIS — Z11.4 SCREENING FOR HIV (HUMAN IMMUNODEFICIENCY VIRUS): ICD-10-CM

## 2023-08-11 DIAGNOSIS — I10 ESSENTIAL HYPERTENSION: ICD-10-CM

## 2023-08-11 DIAGNOSIS — F41.1 GENERALIZED ANXIETY DISORDER: ICD-10-CM

## 2023-08-11 DIAGNOSIS — R53.83 FATIGUE, UNSPECIFIED TYPE: ICD-10-CM

## 2023-08-11 DIAGNOSIS — Z00.00 ANNUAL PHYSICAL EXAM: ICD-10-CM

## 2023-08-11 DIAGNOSIS — M25.50 GENERALIZED JOINT PAIN: ICD-10-CM

## 2023-08-11 DIAGNOSIS — Z11.59 NEED FOR HEPATITIS C SCREENING TEST: ICD-10-CM

## 2023-08-11 DIAGNOSIS — K76.0 FATTY LIVER: ICD-10-CM

## 2023-08-11 DIAGNOSIS — Z13.1 ENCOUNTER FOR SCREENING FOR DIABETES MELLITUS: ICD-10-CM

## 2023-08-11 DIAGNOSIS — Z13.220 LIPID SCREENING: ICD-10-CM

## 2023-08-11 LAB
ALBUMIN SERPL BCP-MCNC: 4 G/DL (ref 3.5–5)
ALP SERPL-CCNC: 60 U/L (ref 46–116)
ALT SERPL W P-5'-P-CCNC: 57 U/L (ref 12–78)
ANION GAP SERPL CALCULATED.3IONS-SCNC: 5 MMOL/L
AST SERPL W P-5'-P-CCNC: 19 U/L (ref 5–45)
B BURGDOR IGG+IGM SER QL IA: NEGATIVE
BASOPHILS # BLD AUTO: 0.1 THOUSANDS/ÂΜL (ref 0–0.1)
BASOPHILS NFR BLD AUTO: 1 % (ref 0–1)
BILIRUB SERPL-MCNC: 0.4 MG/DL (ref 0.2–1)
BILIRUB UR QL STRIP: NEGATIVE
BUN SERPL-MCNC: 11 MG/DL (ref 5–25)
CALCIUM SERPL-MCNC: 8.9 MG/DL (ref 8.3–10.1)
CHLORIDE SERPL-SCNC: 109 MMOL/L (ref 96–108)
CHOLEST SERPL-MCNC: 236 MG/DL
CLARITY UR: CLEAR
CO2 SERPL-SCNC: 27 MMOL/L (ref 21–32)
COLOR UR: COLORLESS
CREAT SERPL-MCNC: 0.86 MG/DL (ref 0.6–1.3)
EOSINOPHIL # BLD AUTO: 0.37 THOUSAND/ÂΜL (ref 0–0.61)
EOSINOPHIL NFR BLD AUTO: 4 % (ref 0–6)
ERYTHROCYTE [DISTWIDTH] IN BLOOD BY AUTOMATED COUNT: 12.1 % (ref 11.6–15.1)
EST. AVERAGE GLUCOSE BLD GHB EST-MCNC: 105 MG/DL
GFR SERPL CREATININE-BSD FRML MDRD: 121 ML/MIN/1.73SQ M
GLUCOSE P FAST SERPL-MCNC: 97 MG/DL (ref 65–99)
GLUCOSE UR STRIP-MCNC: NEGATIVE MG/DL
HBA1C MFR BLD: 5.3 %
HCT VFR BLD AUTO: 46.9 % (ref 36.5–49.3)
HCV AB SER QL: NORMAL
HDLC SERPL-MCNC: 35 MG/DL
HGB BLD-MCNC: 15.4 G/DL (ref 12–17)
HGB UR QL STRIP.AUTO: NEGATIVE
IMM GRANULOCYTES # BLD AUTO: 0.04 THOUSAND/UL (ref 0–0.2)
IMM GRANULOCYTES NFR BLD AUTO: 1 % (ref 0–2)
KETONES UR STRIP-MCNC: NEGATIVE MG/DL
LDLC SERPL DIRECT ASSAY-MCNC: 136 MG/DL (ref 0–100)
LEUKOCYTE ESTERASE UR QL STRIP: NEGATIVE
LYMPHOCYTES # BLD AUTO: 3.16 THOUSANDS/ÂΜL (ref 0.6–4.47)
LYMPHOCYTES NFR BLD AUTO: 38 % (ref 14–44)
MCH RBC QN AUTO: 29.9 PG (ref 26.8–34.3)
MCHC RBC AUTO-ENTMCNC: 32.8 G/DL (ref 31.4–37.4)
MCV RBC AUTO: 91 FL (ref 82–98)
MONOCYTES # BLD AUTO: 0.87 THOUSAND/ÂΜL (ref 0.17–1.22)
MONOCYTES NFR BLD AUTO: 10 % (ref 4–12)
NEUTROPHILS # BLD AUTO: 3.84 THOUSANDS/ÂΜL (ref 1.85–7.62)
NEUTS SEG NFR BLD AUTO: 46 % (ref 43–75)
NITRITE UR QL STRIP: NEGATIVE
NRBC BLD AUTO-RTO: 0 /100 WBCS
PH UR STRIP.AUTO: 7.5 [PH]
PLATELET # BLD AUTO: 374 THOUSANDS/UL (ref 149–390)
PMV BLD AUTO: 10.1 FL (ref 8.9–12.7)
POTASSIUM SERPL-SCNC: 3.9 MMOL/L (ref 3.5–5.3)
PROT SERPL-MCNC: 7.5 G/DL (ref 6.4–8.4)
PROT UR STRIP-MCNC: NEGATIVE MG/DL
RBC # BLD AUTO: 5.15 MILLION/UL (ref 3.88–5.62)
SODIUM SERPL-SCNC: 141 MMOL/L (ref 135–147)
SP GR UR STRIP.AUTO: 1.01 (ref 1–1.03)
TRIGL SERPL-MCNC: 510 MG/DL
TSH SERPL DL<=0.05 MIU/L-ACNC: 2.71 UIU/ML (ref 0.45–4.5)
UROBILINOGEN UR STRIP-ACNC: <2 MG/DL
WBC # BLD AUTO: 8.38 THOUSAND/UL (ref 4.31–10.16)

## 2023-08-11 PROCEDURE — 84443 ASSAY THYROID STIM HORMONE: CPT

## 2023-08-11 PROCEDURE — 87389 HIV-1 AG W/HIV-1&-2 AB AG IA: CPT

## 2023-08-11 PROCEDURE — 80061 LIPID PANEL: CPT

## 2023-08-11 PROCEDURE — 80053 COMPREHEN METABOLIC PANEL: CPT

## 2023-08-11 PROCEDURE — 83036 HEMOGLOBIN GLYCOSYLATED A1C: CPT

## 2023-08-11 PROCEDURE — 81003 URINALYSIS AUTO W/O SCOPE: CPT

## 2023-08-11 PROCEDURE — 85025 COMPLETE CBC W/AUTO DIFF WBC: CPT

## 2023-08-11 PROCEDURE — 86618 LYME DISEASE ANTIBODY: CPT

## 2023-08-11 PROCEDURE — 86803 HEPATITIS C AB TEST: CPT

## 2023-08-11 PROCEDURE — 36415 COLL VENOUS BLD VENIPUNCTURE: CPT

## 2023-08-11 PROCEDURE — 83721 ASSAY OF BLOOD LIPOPROTEIN: CPT

## 2023-08-14 LAB
HIV 1+2 AB+HIV1 P24 AG SERPL QL IA: NORMAL
HIV 2 AB SERPL QL IA: NORMAL
HIV1 AB SERPL QL IA: NORMAL
HIV1 P24 AG SERPL QL IA: NORMAL

## 2023-08-29 ENCOUNTER — OFFICE VISIT (OUTPATIENT)
Age: 23
End: 2023-08-29
Payer: COMMERCIAL

## 2023-08-29 VITALS
OXYGEN SATURATION: 97 % | WEIGHT: 215 LBS | HEART RATE: 80 BPM | HEIGHT: 69 IN | BODY MASS INDEX: 31.84 KG/M2 | SYSTOLIC BLOOD PRESSURE: 126 MMHG | TEMPERATURE: 97.7 F | DIASTOLIC BLOOD PRESSURE: 84 MMHG | RESPIRATION RATE: 18 BRPM

## 2023-08-29 DIAGNOSIS — Z20.828 EXPOSURE TO VIRUS: Primary | ICD-10-CM

## 2023-08-29 PROCEDURE — 99213 OFFICE O/P EST LOW 20 MIN: CPT | Performed by: PHYSICIAN ASSISTANT

## 2023-08-29 NOTE — LETTER
August 29, 2023     Patient: Gini Herndon   YOB: 2000   Date of Visit: 8/29/2023       To Whom it May Concern:    Zuleika Doe was seen in my clinic on 8/29/2023. He may return to work on 8/30/2023 . If you have any questions or concerns, please don't hesitate to call.          Sincerely,          Katy Sethi PA-C        CC: No Recipients

## 2023-08-29 NOTE — PROGRESS NOTES
[facility]        NAME: Breanna Rascon is a 23 y.o. male  : 2000    MRN: [id number]  DATE: 2023  TIME: 3:53 PM    Assessment and Plan   Exposure to virus [Z20.828]  1. Exposure to virus              Patient Instructions     Patient was exposed at home to hand, foot, and mouth disease but he is well and asymptomatic. He was given note to return to work. Follow up with PCP in 3-5 days. Proceed to  ER if symptoms worsen. Chief Complaint     Chief Complaint   Patient presents with   • exposure to hand foot and mouth     Patient's kids had hand foot and mouth, he was exposed and needs a note to go back to work. Denies any symptoms. History of Present Illness       Patient presents requesting return to work note as he was exposed at home to his children who were recently diagnosed with hand-foot-and-mouth disease. The patient feels well and denies any symptoms or complaints at this time. Review of Systems   Review of Systems   Constitutional: Negative. HENT: Negative. Respiratory: Negative. Cardiovascular: Negative. Gastrointestinal: Negative. Genitourinary: Negative. Skin: Negative.           Current Medications       Current Outpatient Medications:   •  albuterol (2.5 mg/3 mL) 0.083 % nebulizer solution, Take 3 mL (2.5 mg total) by nebulization every 6 (six) hours as needed for wheezing or shortness of breath, Disp: 180 mL, Rfl: 5  •  albuterol (ACCUNEB) 1.25 MG/3ML nebulizer solution, Inhale 1 ampule every 6 (six) hours as needed, Disp: , Rfl:   •  albuterol (Ventolin HFA) 90 mcg/act inhaler, Inhale 2 puffs every 6 (six) hours as needed for wheezing, Disp: 2 Inhaler, Rfl: 0  •  buPROPion (WELLBUTRIN XL) 150 mg 24 hr tablet, take 1 tablet by mouth every morning, Disp: 90 tablet, Rfl: 3  •  omeprazole (PriLOSEC) 40 MG capsule, take 1 capsule by mouth every evening, Disp: 30 capsule, Rfl: 3  •  valsartan (DIOVAN) 80 mg tablet, Take 1 tablet (80 mg total) by mouth daily, Disp: 90 tablet, Rfl: 0  •  EPINEPHrine (EPIPEN) 0.3 mg/0.3 mL SOAJ, EpiPen 2-Jayme 0.3 MG/0.3ML Injection Solution Auto-injector Use as directed, prn allergic reaction  Quantity: 1;  Refills: 1   Quincy Ignacio DO ;  Start 31-July-2014 Active 2 Solution Auto-injector Pen, Disp: , Rfl:     Current Allergies     Allergies as of 08/29/2023 - Reviewed 08/29/2023   Allergen Reaction Noted   • Nuts - food allergy Anaphylaxis    • Other Anaphylaxis 08/06/2017   • Shellfish-derived products - food allergy Anaphylaxis 08/06/2017   • Latex Itching 10/03/2022   • Peanut oil - food allergy  05/03/2017   • Penicillins  10/30/2020            The following portions of the patient's history were reviewed and updated as appropriate: allergies, current medications, past family history, past medical history, past social history, past surgical history and problem list.     Past Medical History:   Diagnosis Date   • Allergic    • Anxiety    • Arthritis    • Asthma    • Asthma due to seasonal allergies    • GERD (gastroesophageal reflux disease)    • Headache(784.0)    • HL (hearing loss)    • Hypertension    • Memory loss        Past Surgical History:   Procedure Laterality Date   • COLONOSCOPY     • NO PAST SURGERIES     • UPPER GASTROINTESTINAL ENDOSCOPY     • WISDOM TOOTH EXTRACTION         Family History   Problem Relation Age of Onset   • No Known Problems Mother    • No Known Problems Father          Medications have been verified. Objective   /84   Pulse 80   Temp 97.7 °F (36.5 °C)   Resp 18   Ht 5' 9" (1.753 m)   Wt 97.5 kg (215 lb)   SpO2 97%   BMI 31.75 kg/m²   No LMP for male patient. Physical Exam     Physical Exam  Vitals reviewed. Constitutional:       General: He is not in acute distress. Appearance: He is well-developed. HENT:      Mouth/Throat:      Mouth: Mucous membranes are moist.      Pharynx: Oropharynx is clear.    Cardiovascular:      Rate and Rhythm: Normal rate and regular rhythm. Heart sounds: Normal heart sounds. No murmur heard. Pulmonary:      Effort: Pulmonary effort is normal. No respiratory distress. Breath sounds: Normal breath sounds. Musculoskeletal:      Cervical back: Neck supple. Lymphadenopathy:      Cervical: No cervical adenopathy. Skin:     Findings: No rash. Neurological:      Mental Status: He is alert and oriented to person, place, and time.

## 2023-09-07 DIAGNOSIS — I10 ESSENTIAL HYPERTENSION: ICD-10-CM

## 2023-09-07 DIAGNOSIS — K21.9 GASTROESOPHAGEAL REFLUX DISEASE WITHOUT ESOPHAGITIS: ICD-10-CM

## 2023-09-07 RX ORDER — VALSARTAN 80 MG/1
80 TABLET ORAL DAILY
Qty: 90 TABLET | Refills: 0 | Status: SHIPPED | OUTPATIENT
Start: 2023-09-07

## 2023-09-07 RX ORDER — OMEPRAZOLE 40 MG/1
CAPSULE, DELAYED RELEASE ORAL
Qty: 30 CAPSULE | Refills: 0 | Status: SHIPPED | OUTPATIENT
Start: 2023-09-07

## 2023-09-07 NOTE — TELEPHONE ENCOUNTER
Attempted calling patient, unable to speak with patient or LVM. Attempted calling regarding OV for future refills.

## 2023-09-14 ENCOUNTER — OFFICE VISIT (OUTPATIENT)
Age: 23
End: 2023-09-14
Payer: COMMERCIAL

## 2023-09-14 VITALS
WEIGHT: 200 LBS | HEART RATE: 103 BPM | TEMPERATURE: 97.1 F | DIASTOLIC BLOOD PRESSURE: 88 MMHG | SYSTOLIC BLOOD PRESSURE: 146 MMHG | OXYGEN SATURATION: 98 % | RESPIRATION RATE: 18 BRPM | BODY MASS INDEX: 29.62 KG/M2 | HEIGHT: 69 IN

## 2023-09-14 DIAGNOSIS — H60.502 ACUTE OTITIS EXTERNA OF LEFT EAR, UNSPECIFIED TYPE: ICD-10-CM

## 2023-09-14 DIAGNOSIS — H72.92 TYMPANIC MEMBRANE PERFORATION, LEFT: Primary | ICD-10-CM

## 2023-09-14 DIAGNOSIS — L25.9 CONTACT DERMATITIS, UNSPECIFIED CONTACT DERMATITIS TYPE, UNSPECIFIED TRIGGER: ICD-10-CM

## 2023-09-14 PROCEDURE — 99213 OFFICE O/P EST LOW 20 MIN: CPT

## 2023-09-14 RX ORDER — OFLOXACIN 3 MG/ML
10 SOLUTION AURICULAR (OTIC) DAILY
Qty: 5 ML | Refills: 0 | Status: SHIPPED | OUTPATIENT
Start: 2023-09-14

## 2023-09-14 RX ORDER — TRIAMCINOLONE ACETONIDE 1 MG/G
CREAM TOPICAL 2 TIMES DAILY
Qty: 30 G | Refills: 0 | Status: SHIPPED | OUTPATIENT
Start: 2023-09-14

## 2023-09-14 NOTE — PROGRESS NOTES
[facility]        NAME: Ne Nicolas is a 23 y.o. male  : 2000    MRN: [id number]  DATE: 2023  TIME: 2:34 PM    Assessment and Plan   Tympanic membrane perforation, left [H72.92]  1. Tympanic membrane perforation, left  ofloxacin (FLOXIN) 0.3 % otic solution      2. Acute otitis externa of left ear, unspecified type  ofloxacin (FLOXIN) 0.3 % otic solution      3. Contact dermatitis, unspecified contact dermatitis type, unspecified trigger  triamcinolone (KENALOG) 0.1 % cream            Patient Instructions   Use ear drops for 7 days  Kenalog cream 2x a day until rash improves. Follow up with PCP in 3-5 days if not improving. Proceed to ER if symptoms worsen. Chief Complaint     Chief Complaint   Patient presents with   • Earache     Three hours ago while at work he felt ringing in his ear and popped, hearing is now decreased. • Rash     Rash on left arm for one week. Tried hydrocortisone cream, and benadryl spray. History of Present Illness       Felt a pop in left ear about 3 hours ago after having ringing in left ear. States since then he has decreased hearing. Has a rash on left forearm for about 1 week, states he does not believe he came into contact with poison plants. Has used hydrocortisone and benadryl spray without relief. Review of Systems   Review of Systems   Constitutional: Negative for chills and fever. HENT: Positive for hearing loss. Negative for ear pain and sore throat. Eyes: Negative for pain and visual disturbance. Respiratory: Negative for cough and shortness of breath. Cardiovascular: Negative for chest pain and palpitations. Gastrointestinal: Negative for abdominal pain and vomiting. Genitourinary: Negative for dysuria and hematuria. Musculoskeletal: Negative for arthralgias and back pain. Skin: Positive for rash. Negative for color change.    Neurological: Negative for dizziness, seizures, syncope, weakness and light-headedness. All other systems reviewed and are negative.         Current Medications       Current Outpatient Medications:   •  albuterol (2.5 mg/3 mL) 0.083 % nebulizer solution, Take 3 mL (2.5 mg total) by nebulization every 6 (six) hours as needed for wheezing or shortness of breath, Disp: 180 mL, Rfl: 5  •  albuterol (ACCUNEB) 1.25 MG/3ML nebulizer solution, Inhale 1 ampule every 6 (six) hours as needed, Disp: , Rfl:   •  albuterol (Ventolin HFA) 90 mcg/act inhaler, Inhale 2 puffs every 6 (six) hours as needed for wheezing, Disp: 2 Inhaler, Rfl: 0  •  buPROPion (WELLBUTRIN XL) 150 mg 24 hr tablet, take 1 tablet by mouth every morning, Disp: 90 tablet, Rfl: 3  •  ofloxacin (FLOXIN) 0.3 % otic solution, Administer 10 drops into the left ear daily, Disp: 5 mL, Rfl: 0  •  omeprazole (PriLOSEC) 40 MG capsule, take 1 capsule by mouth every evening, Disp: 30 capsule, Rfl: 0  •  triamcinolone (KENALOG) 0.1 % cream, Apply topically 2 (two) times a day, Disp: 30 g, Rfl: 0  •  valsartan (DIOVAN) 80 mg tablet, take 1 tablet by mouth once daily, Disp: 90 tablet, Rfl: 0  •  EPINEPHrine (EPIPEN) 0.3 mg/0.3 mL SOAJ, EpiPen 2-Jayme 0.3 MG/0.3ML Injection Solution Auto-injector Use as directed, prn allergic reaction  Quantity: 1;  Refills: 1   Quincy Ignacio DO ;  Start 31-July-2014 Active 2 Solution Auto-injector Pen, Disp: , Rfl:     Current Allergies     Allergies as of 09/14/2023 - Reviewed 09/14/2023   Allergen Reaction Noted   • Nuts - food allergy Anaphylaxis    • Other Anaphylaxis 08/06/2017   • Shellfish-derived products - food allergy Anaphylaxis 08/06/2017   • Latex Itching 10/03/2022   • Peanut oil - food allergy  05/03/2017   • Penicillins  10/30/2020            The following portions of the patient's history were reviewed and updated as appropriate: allergies, current medications, past family history, past medical history, past social history, past surgical history and problem list.     Past Medical History: Diagnosis Date   • Allergic    • Anxiety    • Arthritis    • Asthma    • Asthma due to seasonal allergies    • GERD (gastroesophageal reflux disease)    • Headache(784.0)    • HL (hearing loss)    • Hypertension    • Memory loss        Past Surgical History:   Procedure Laterality Date   • COLONOSCOPY     • NO PAST SURGERIES     • UPPER GASTROINTESTINAL ENDOSCOPY     • WISDOM TOOTH EXTRACTION         Family History   Problem Relation Age of Onset   • No Known Problems Mother    • No Known Problems Father          Medications have been verified. Objective   /88   Pulse 103   Temp (!) 97.1 °F (36.2 °C)   Resp 18   Ht 5' 9" (1.753 m)   Wt 90.7 kg (200 lb)   SpO2 98%   BMI 29.53 kg/m²   No LMP for male patient. Physical Exam     Physical Exam  Vitals and nursing note reviewed. Constitutional:       Appearance: Normal appearance. HENT:      Head: Normocephalic and atraumatic. Right Ear: Tympanic membrane has decreased mobility. Left Ear: Decreased hearing noted. Swelling present. Tympanic membrane is perforated. Skin:     General: Skin is warm and dry. Capillary Refill: Capillary refill takes less than 2 seconds. Neurological:      General: No focal deficit present. Mental Status: He is alert and oriented to person, place, and time. Mental status is at baseline. Sensory: No sensory deficit. Motor: No weakness. Psychiatric:         Mood and Affect: Mood normal.         Behavior: Behavior normal.         Thought Content:  Thought content normal.

## 2023-09-14 NOTE — PATIENT INSTRUCTIONS
Use ear drops for 7 days  Kenalog cream 2x a day until rash improves. Follow up with PCP in 3-5 days if not improving. Proceed to ER if symptoms worsen.

## 2023-09-21 ENCOUNTER — OFFICE VISIT (OUTPATIENT)
Age: 23
End: 2023-09-21

## 2023-09-21 VITALS
TEMPERATURE: 97.1 F | RESPIRATION RATE: 16 BRPM | SYSTOLIC BLOOD PRESSURE: 130 MMHG | DIASTOLIC BLOOD PRESSURE: 90 MMHG | HEART RATE: 96 BPM | OXYGEN SATURATION: 97 %

## 2023-09-21 DIAGNOSIS — Z13.21 ENCOUNTER FOR VITAMIN DEFICIENCY SCREENING: ICD-10-CM

## 2023-09-21 DIAGNOSIS — R42 LIGHTHEADEDNESS: Primary | ICD-10-CM

## 2023-09-21 DIAGNOSIS — M25.50 GENERALIZED JOINT PAIN: ICD-10-CM

## 2023-09-21 NOTE — PROGRESS NOTES
Name: Iman Gilman      : 2000      MRN: [id number]  Encounter Provider: JANAK Cardoso  Encounter Date: 2023   Encounter department: [address] [place] [address] PRIMARY CARE    Assessment & Plan     Vital signs and physical exam unremarkable. Specifically, neurologic and cardiac exams are benign. Etiology unclear at this point. Reassurance was provided to the patient as he had a similar occurrence last October and the condition self resolved. I encouraged the patient to ensure adequate hydration and to eat small frequent meals to prevent hypoglycemia or orthostasis. His presentation is not consistent with vertigo. I thoroughly reviewed with the patient what signs and symptoms warrant emergency evaluation, an educational handout was also provided, patient verbalized understanding. Patient requesting a vitamin deficiency screening, will check vitamin D and B12 levels. I encouraged the patient to check with his insurance company regarding coverage for these labs as "vitamin screening" is not commonly an acceptable diagnosis for coverage. Patient is also reporting "decreased effects" from his bupropion. Patient inquiring about other medications for his mental health. I provided him with an educational handout on SSRIs, will discuss in more detail at follow-up. Lastly, regarding chronic generalized joint pain -> will order rheumatologic lab panel as the patient states he has a family history of fibromyalgia and rheumatoid arthritis. 1. Lightheadedness  -     COLLINS Screen w/ Reflex to Titer/Pattern; Future  -     RF Screen w/ Reflex to Titer; Future  -     CK; Future  -     C-reactive protein; Future    2. Generalized joint pain  -     COLLINS Screen w/ Reflex to Titer/Pattern; Future  -     RF Screen w/ Reflex to Titer; Future  -     CK; Future  -     C-reactive protein; Future    3. Encounter for vitamin deficiency screening  -     Vitamin D 25 hydroxy;  Future  -     Vitamin B12; Future           Subjective      Patient presents for evaluation of "lightheadedness". The symptoms started several days (2-3 days) ago and have not worsened or improved. Feels this sensation "all the time", lightheaded quality per patient report. Positions that worsen symptoms: none. Previous workup/treatments: CT scan of Oct last year was benign. Patient was admitted to the emergency room last year in October with a very similar presentation (lightheadedness and lower abdominal pain), work-up was unremarkable. Associated ear symptoms: none. Associated CNS symptoms: "trouble finding words and brain fog". Recent infections: none. Head trauma: denied. Drug ingestion: none. Noise exposure: no occupational exposure. Family history: denies. Patient also reporting abdominal pain in b/l LQs, started with onset of dizziness. Sharp pain "at times", rated 9/10 at worse, usually at a 4/10. Denies n/v, changes in BMs, etc.     Also continues to report generalized joint pain as previously described/discussed at annual physical in August 2023. Review of Systems   Constitutional: Negative for fever. Respiratory: Positive for chest tightness. Negative for shortness of breath. Cardiovascular: Negative for chest pain. Gastrointestinal: Positive for abdominal pain. Negative for diarrhea, nausea and vomiting. Neurological: Positive for dizziness and light-headedness. Negative for syncope.        Current Outpatient Medications on File Prior to Visit   Medication Sig   • albuterol (2.5 mg/3 mL) 0.083 % nebulizer solution Take 3 mL (2.5 mg total) by nebulization every 6 (six) hours as needed for wheezing or shortness of breath   • albuterol (ACCUNEB) 1.25 MG/3ML nebulizer solution Inhale 1 ampule every 6 (six) hours as needed   • albuterol (Ventolin HFA) 90 mcg/act inhaler Inhale 2 puffs every 6 (six) hours as needed for wheezing   • buPROPion (WELLBUTRIN XL) 150 mg 24 hr tablet take 1 tablet by mouth every morning   • EPINEPHrine (EPIPEN) 0.3 mg/0.3 mL SOAJ EpiPen 2-Jayme 0.3 MG/0.3ML Injection Solution Auto-injector  Use as directed, prn allergic reaction   Quantity: 1;  Refills: 1      Quincy Ignacio DO ;  Start 31-July-2014  Active  2 Solution Auto-injector Pen   • ofloxacin (FLOXIN) 0.3 % otic solution Administer 10 drops into the left ear daily   • omeprazole (PriLOSEC) 40 MG capsule take 1 capsule by mouth every evening   • triamcinolone (KENALOG) 0.1 % cream Apply topically 2 (two) times a day   • valsartan (DIOVAN) 80 mg tablet take 1 tablet by mouth once daily       Objective     /90   Pulse 96   Temp (!) 97.1 °F (36.2 °C)   Resp 16   SpO2 97%     Physical Exam  Constitutional:       General: He is not in acute distress. Appearance: Normal appearance. He is not ill-appearing, toxic-appearing or diaphoretic. Eyes:      Extraocular Movements: Extraocular movements intact. Conjunctiva/sclera: Conjunctivae normal.      Pupils: Pupils are equal, round, and reactive to light. Cardiovascular:      Rate and Rhythm: Normal rate and regular rhythm. Heart sounds: Normal heart sounds. Pulmonary:      Effort: Pulmonary effort is normal. No respiratory distress. Breath sounds: Normal breath sounds. No wheezing. Abdominal:      General: Abdomen is flat. Bowel sounds are normal.      Tenderness: There is no abdominal tenderness. Musculoskeletal:         General: Normal range of motion. Cervical back: Normal range of motion. Skin:     Findings: No erythema or rash. Neurological:      Mental Status: He is alert and oriented to person, place, and time. Cranial Nerves: Cranial nerves 2-12 are intact. No facial asymmetry. Sensory: Sensation is intact. Motor: Motor function is intact. No weakness, tremor or pronator drift. Coordination: Coordination is intact. Romberg sign negative.  Coordination normal. Finger-Nose-Finger Test and Heel to [facility] Test normal. Rapid alternating movements normal.      Gait: Gait is intact. Psychiatric:         Mood and Affect: Affect is flat. Speech: Speech normal. Speech is not slurred. Behavior: Behavior is slowed. Thought Content:  Thought content normal.       JANAK Burch

## 2023-09-21 NOTE — PATIENT INSTRUCTIONS
Selective serotonin reuptake inhibitors (SSRIs)    This commonly prescribed type of antidepressant can help you overcome depression. Discover how SSRIs improve mood and what side effects they may cause. By [facility] Staff  Selective serotonin reuptake inhibitors (SSRIs) are the most commonly prescribed antidepressants. They can ease symptoms of moderate to severe depression, are relatively safe and typically cause fewer side effects than other types of antidepressants do. SSRIs treat depression by increasing levels of serotonin in the brain. Serotonin is one of the chemical messengers (neurotransmitters) that carry signals between brain nerve cells (neurons). SSRIs block the reabsorption (reuptake) of serotonin into neurons. This makes more serotonin available to improve transmission of messages between neurons. SSRIs are called selective because they mainly affect serotonin, not other neurotransmitters. SSRIs may also be used to treat conditions other than depression, such as anxiety disorders. The Food and Drug Administration (FDA) has approved these SSRIs to treat depression:  Citalopram (Celexa)  Escitalopram (Lexapro)  Fluoxetine (Prozac)  Paroxetine (Paxil, Pexeva)  Sertraline (Zoloft)  All SSRIs are thought to work in a similar way and generally can cause similar side effects, though some people may not experience any. Many side effects may go away after the first few weeks of treatment, while others may lead you and your doctor to try a different drug. If you can't tolerate one SSRI, you may be able to tolerate a different one, as SSRIs differ in their potencies at blocking serotonin reuptake and in how quickly the body eliminates (metabolizes) the drug.   Possible side effects of SSRIs may include, among others:  Nausea, vomiting or diarrhea  Headache  Drowsiness  Dry mouth  Insomnia  Nervousness, agitation or restlessness  Dizziness  Sexual problems, such as reduced sexual desire, difficulty reaching orgasm or inability to maintain an erection (erectile dysfunction)  Impact on appetite, leading to weight loss or weight gain  Taking your medication with food may reduce the risk of nausea. Also, as long as your medication doesn't keep you from sleeping, you can reduce the impact of nausea by taking it at bedtime. Which antidepressant is best for you depends on a number of issues, such as your symptoms and any other health conditions you may have. Ask your doctor and pharmacist about the most common possible side effects for your specific SSRI and read the patient medication guide that comes with the prescription. SSRIs are generally safe for most people. However, in some circumstances they can cause problems. For example, high doses of citalopram may cause dangerous abnormal heart rhythms, so doses over 40 milligrams (mg) a day should be avoided according to the FDA and the . They also recommend a maximum daily dose of 20 mg of citalopram for people over age 61. Issues to discuss with your doctor before you take an SSRI include:  Drug interactions. When taking an antidepressant, tell your doctor about any other prescription or over-the-counter medications, herbs or other supplements you're taking. Some antidepressants can interfere with the effectiveness of other medications, and some can cause dangerous reactions when combined with certain medications or herbal supplements. For example, SSRIs may increase your risk of bleeding, especially when you're taking other medications that increase the risk of bleeding, such as nonsteroidal anti-inflammatory drugs (NSAIDs), aspirin, warfarin (Coumadin, Jantoven) and other blood thinners. Serotonin syndrome. Rarely, an antidepressant can cause high levels of serotonin to accumulate in your body. Serotonin syndrome most often occurs when two medications that raise the level of serotonin are combined.  These include, for example, other antidepressants, certain pain or headache medications, and the herbal supplement Colquitt's wort. Signs and symptoms of serotonin syndrome include anxiety, agitation, high fever, sweating, confusion, tremors, restlessness, lack of coordination, major changes in blood pressure and a rapid heart rate. Seek immediate medical attention if you have any of these signs or symptoms. Antidepressants and pregnancy. Talk to your doctor about the risks and benefits of using specific antidepressants. Some antidepressants may harm your baby if you take them during pregnancy or while you're breast-feeding. If you're taking an antidepressant and you're considering getting pregnant, talk to your doctor about the possible risks. Don't stop taking your medication without contacting your doctor first, as stopping might pose risks for you. Most antidepressants are generally safe, but the FDA requires that all antidepressants carry black box warnings, the strictest warnings for prescriptions. In some cases, children, teenagers and young adults under 22 may have an increase in suicidal thoughts or behavior when taking antidepressants, especially in the first few weeks after starting or when the dose is changed. Anyone taking an antidepressant should be watched closely for worsening depression or unusual behavior. If you or someone you know has suicidal thoughts when taking an antidepressant, immediately contact your doctor or get emergency help. Keep in mind that antidepressants are more likely to reduce suicide risk in the long run by improving mood. SSRIs aren't addictive. However, stopping antidepressant treatment abruptly or missing several doses can cause withdrawal-like symptoms. This is sometimes called discontinuation syndrome. Work with your doctor to gradually and safely decrease your dose.   Withdrawal-like symptoms can include:  General feeling of uneasiness  Nausea  Dizziness  Lethargy  Flu-like symptoms  People may react differently to the same antidepressant. For example, a particular drug may work better -- or not as well -- for you than for another person. Or you may have more, or fewer, side effects from taking a specific antidepressant than someone else does. Inherited traits play a role in how antidepressants affect you. If you have a close relative who responded to a particular antidepressant, tell your doctor, because this could be a good drug choice to start. In some cases, results of special blood tests, where available, may offer clues about how your body may respond to a particular antidepressant. However, other variables can affect your response to medication. When choosing an antidepressant, your doctor takes into account your symptoms, any health problems, other medications you take and what has worked for you in the past.  Typically, it may take several weeks or longer before an antidepressant is fully effective and for initial side effects to ease up. Your doctor may recommend some dose adjustments or different antidepressants, but with patience, you and your doctor can find a medication that works well for you. Show References  Depression: FDA-approved medications may help. U.S. Food and Drug Administration. http://ardno.com/. Accessed Aug. 13, 2019. Depression basics. [address]. https://www.Proactive Comfort/. shtml. Accessed Aug. 13, 2019. Revisions to product labeling. U.S. Food and Drug Administration. CellTraders.no. Accessed Aug. 13, 2019. Suicidality in children and adolescents being treated with antidepressant medications. U.S. Food and Drug Administration. CoachingBuilder.gl.  Accessed Aug. 13, 2019Nena House et al. Antidepressant discontinuation syndrome. CMAJ. 2017; doi:10.1503/cmaj.575425. What is pharmacogenomics? Genetics Home Reference. https://ghr.nlm.nih.gov/primer/genomicresearch/pharmacogenomics. Accessed Aug. 13, 2019. Mental health medications. [address] [place]. http://www.takokat/. shtml#part_149856. Accessed Aug. 13, 2019. Selective serotonin reuptake inhibitors (SSRIs) information. U.S. Food and Drug Administration. https://Egodeus.TruLeaf/. Accessed Aug. 19, 2019. Edgard Arauz et al. Selective serotonin reuptake inhibitors: Pharmacology, administration, and side effects. Lionexpo.. Accessed Aug. 18, 2019. Celexa (prescribing information). Allergan; 2019. [employer].TruLeaf.cy. Accessed Sept. 11, 2019. Lexapro (prescribing information). Allergan; 2019. GreatestFeeling.no. Accessed Sept. 11, 2019. Prozac (prescribing information). [address] 2017. http://pi. jose. com/us/prozac.pdf?reqNavId=undefined. Accessed Sept. 11, 2019. Paxil (prescribing information). [employer].; 2016. https://dailymed. nlm.nih.gov/dailymed/drugInfo.cfm?nhrxv=990yyh29-0s73-733d-000s-pq0m21031k23. Accessed Sept. 11, 2019. Zoloft (prescribing information). [employer].; 2018. http://labeling.Fabbeo.com/ShowLabeling. aspx?ts=942. Accessed Sept. 11, 2019. Pema VILLARREAL (expert opinion). [address] [facility]. Sept. 3, 2019.

## 2023-10-16 ENCOUNTER — APPOINTMENT (OUTPATIENT)
Age: 23
End: 2023-10-16
Payer: COMMERCIAL

## 2023-10-16 DIAGNOSIS — Z13.21 ENCOUNTER FOR VITAMIN DEFICIENCY SCREENING: ICD-10-CM

## 2023-10-16 DIAGNOSIS — R42 LIGHTHEADEDNESS: ICD-10-CM

## 2023-10-16 DIAGNOSIS — M25.50 GENERALIZED JOINT PAIN: ICD-10-CM

## 2023-10-16 LAB
25(OH)D3 SERPL-MCNC: 18.4 NG/ML (ref 30–100)
ANA SER QL IA: NEGATIVE
CK SERPL-CCNC: 150 U/L (ref 39–308)
CRP SERPL QL: 1.5 MG/L
VIT B12 SERPL-MCNC: 259 PG/ML (ref 180–914)

## 2023-10-16 PROCEDURE — 82550 ASSAY OF CK (CPK): CPT

## 2023-10-16 PROCEDURE — 86430 RHEUMATOID FACTOR TEST QUAL: CPT

## 2023-10-16 PROCEDURE — 82306 VITAMIN D 25 HYDROXY: CPT

## 2023-10-16 PROCEDURE — 82607 VITAMIN B-12: CPT

## 2023-10-16 PROCEDURE — 36415 COLL VENOUS BLD VENIPUNCTURE: CPT

## 2023-10-16 PROCEDURE — 86038 ANTINUCLEAR ANTIBODIES: CPT

## 2023-10-16 PROCEDURE — 86140 C-REACTIVE PROTEIN: CPT

## 2023-10-17 LAB — RHEUMATOID FACT SER QL LA: NEGATIVE

## 2023-10-25 DIAGNOSIS — K21.9 GASTROESOPHAGEAL REFLUX DISEASE WITHOUT ESOPHAGITIS: ICD-10-CM

## 2023-10-25 RX ORDER — OMEPRAZOLE 40 MG/1
40 CAPSULE, DELAYED RELEASE ORAL EVERY EVENING
Qty: 90 CAPSULE | Refills: 3 | Status: SHIPPED | OUTPATIENT
Start: 2023-10-25

## 2023-10-25 NOTE — TELEPHONE ENCOUNTER
Patient called requesting a refill of       omeprazole (PriLOSEC) 40 MG capsule take 1 capsule by mouth every evening

## 2023-11-05 ENCOUNTER — OFFICE VISIT (OUTPATIENT)
Age: 23
End: 2023-11-05
Payer: COMMERCIAL

## 2023-11-05 VITALS
OXYGEN SATURATION: 98 % | HEART RATE: 85 BPM | WEIGHT: 200 LBS | DIASTOLIC BLOOD PRESSURE: 80 MMHG | SYSTOLIC BLOOD PRESSURE: 142 MMHG | BODY MASS INDEX: 29.62 KG/M2 | HEIGHT: 69 IN | RESPIRATION RATE: 20 BRPM | TEMPERATURE: 97.6 F

## 2023-11-05 DIAGNOSIS — J06.9 URI WITH COUGH AND CONGESTION: Primary | ICD-10-CM

## 2023-11-05 PROCEDURE — 99213 OFFICE O/P EST LOW 20 MIN: CPT

## 2023-11-05 RX ORDER — PREDNISONE 20 MG/1
20 TABLET ORAL 2 TIMES DAILY
Qty: 10 TABLET | Refills: 0 | Status: SHIPPED | OUTPATIENT
Start: 2023-11-05 | End: 2023-11-10

## 2023-11-05 NOTE — PROGRESS NOTES
[facility]        NAME: Iman Gilman is a 23 y.o. male  : 2000    MRN: [id number]  DATE: 2023  TIME: 12:55 PM    Assessment and Plan   URI with cough and congestion [J06.9]  1. URI with cough and congestion  predniSONE 20 mg tablet          Provided with paper script- EPIC not transmitting with pharmacies at this time. Patient Instructions   At this time you have been diagnosed with a viral Upper Respiratory Infection (URI). For viral illnesses, the recommendation is for supportive care which would consists of the following: For decongestion:  Steroids that was prescribed  Nasal corticosteroid: examples are Flonase or Nasacort. For Cough or sore throat:  Salt water gurgle  Honey  Chloraseptic spray  Throat lozenges  Over the Counter Tylenol or Ibuprofen    Follow up with PCP in 3-5 days if symptoms not improving. Proceed to ER if symptoms worsen. Chief Complaint     Chief Complaint   Patient presents with   • Nasal Congestion     C/o sinus congestion, dry mouth and dizzy x1wk. History of Present Illness       Congestion, swollen lymph nodes, dry mouth, and sore throat for 1 week. Review of Systems   Review of Systems   Constitutional:  Negative for chills and fever. HENT:  Positive for congestion and sore throat. Respiratory:  Positive for cough. Negative for shortness of breath. Gastrointestinal:  Negative for abdominal pain, diarrhea, nausea and vomiting. Musculoskeletal:  Negative for myalgias. Neurological:  Negative for dizziness, weakness and light-headedness.          Current Medications       Current Outpatient Medications:   •  albuterol (2.5 mg/3 mL) 0.083 % nebulizer solution, Take 3 mL (2.5 mg total) by nebulization every 6 (six) hours as needed for wheezing or shortness of breath, Disp: 180 mL, Rfl: 5  •  albuterol (ACCUNEB) 1.25 MG/3ML nebulizer solution, Inhale 1 ampule every 6 (six) hours as needed, Disp: , Rfl:   •  albuterol (Ventolin HFA) 90 mcg/act inhaler, Inhale 2 puffs every 6 (six) hours as needed for wheezing, Disp: 2 Inhaler, Rfl: 0  •  buPROPion (WELLBUTRIN XL) 150 mg 24 hr tablet, take 1 tablet by mouth every morning, Disp: 90 tablet, Rfl: 3  •  omeprazole (PriLOSEC) 40 MG capsule, Take 1 capsule (40 mg total) by mouth every evening, Disp: 90 capsule, Rfl: 3  •  predniSONE 20 mg tablet, Take 1 tablet (20 mg total) by mouth 2 (two) times a day for 5 days, Disp: 10 tablet, Rfl: 0  •  triamcinolone (KENALOG) 0.1 % cream, Apply topically 2 (two) times a day, Disp: 30 g, Rfl: 0  •  valsartan (DIOVAN) 80 mg tablet, take 1 tablet by mouth once daily, Disp: 90 tablet, Rfl: 0  •  EPINEPHrine (EPIPEN) 0.3 mg/0.3 mL SOAJ, EpiPen 2-Jayme 0.3 MG/0.3ML Injection Solution Auto-injector Use as directed, prn allergic reaction  Quantity: 1;  Refills: 1   Quincy Ignacio DO ;  Start 31-July-2014 Active 2 Solution Auto-injector Pen, Disp: , Rfl:   •  ofloxacin (FLOXIN) 0.3 % otic solution, Administer 10 drops into the left ear daily (Patient not taking: Reported on 11/5/2023), Disp: 5 mL, Rfl: 0    Current Allergies     Allergies as of 11/05/2023 - Reviewed 11/05/2023   Allergen Reaction Noted   • Nuts - food allergy Anaphylaxis    • Other Anaphylaxis 08/06/2017   • Shellfish-derived products - food allergy Anaphylaxis 08/06/2017   • Latex Itching 10/03/2022   • Peanut oil - food allergy  05/03/2017   • Penicillins  10/30/2020            The following portions of the patient's history were reviewed and updated as appropriate: allergies, current medications, past family history, past medical history, past social history, past surgical history and problem list.     Past Medical History:   Diagnosis Date   • Allergic    • Anxiety    • Arthritis    • Asthma    • Asthma due to seasonal allergies    • GERD (gastroesophageal reflux disease)    • Headache(784.0)    • HL (hearing loss)    • Hypertension    • Memory loss        Past Surgical History: Procedure Laterality Date   • COLONOSCOPY     • NO PAST SURGERIES     • UPPER GASTROINTESTINAL ENDOSCOPY     • WISDOM TOOTH EXTRACTION         Family History   Problem Relation Age of Onset   • Fibromyalgia Mother    • Rheum arthritis Father          Medications have been verified. Objective   /80 (BP Location: Right arm, Patient Position: Sitting)   Pulse 85   Temp 97.6 °F (36.4 °C)   Resp 20   Ht 5' 9" (1.753 m)   Wt 90.7 kg (200 lb)   SpO2 98%   BMI 29.53 kg/m²   No LMP for male patient. Physical Exam     Physical Exam  Vitals and nursing note reviewed. Constitutional:       Appearance: Normal appearance. HENT:      Head: Normocephalic and atraumatic. Right Ear: Tympanic membrane normal.      Left Ear: Tympanic membrane normal.      Nose: Congestion present. Mouth/Throat:      Mouth: Mucous membranes are moist.      Pharynx: Posterior oropharyngeal erythema present. No oropharyngeal exudate. Eyes:      Pupils: Pupils are equal, round, and reactive to light. Pulmonary:      Effort: Pulmonary effort is normal.   Musculoskeletal:      Cervical back: Normal range of motion. Tenderness present. Lymphadenopathy:      Cervical: Cervical adenopathy present. Skin:     General: Skin is warm and dry. Capillary Refill: Capillary refill takes less than 2 seconds. Neurological:      General: No focal deficit present. Mental Status: He is alert and oriented to person, place, and time. Mental status is at baseline. Sensory: No sensory deficit. Motor: No weakness. Psychiatric:         Mood and Affect: Mood normal.         Behavior: Behavior normal.         Thought Content:  Thought content normal.

## 2023-11-05 NOTE — PATIENT INSTRUCTIONS
At this time you have been diagnosed with a viral Upper Respiratory Infection (URI). For viral illnesses, the recommendation is for supportive care which would consists of the following: For decongestion:  Steroids that was prescribed  Nasal corticosteroid: examples are Flonase or Nasacort. For Cough or sore throat:  Salt water gurgle  Honey  Chloraseptic spray  Throat lozenges  Over the Counter Tylenol or Ibuprofen    Follow up with PCP in 3-5 days if symptoms not improving. Proceed to ER if symptoms worsen.

## 2023-11-14 ENCOUNTER — HOSPITAL ENCOUNTER (EMERGENCY)
Facility: HOSPITAL | Age: 23
Discharge: HOME | End: 2023-11-14
Attending: EMERGENCY MEDICINE
Payer: COMMERCIAL

## 2023-11-14 ENCOUNTER — TELEPHONE (OUTPATIENT)
Age: 23
End: 2023-11-14

## 2023-11-14 ENCOUNTER — APPOINTMENT (EMERGENCY)
Dept: CT IMAGING | Facility: HOSPITAL | Age: 23
End: 2023-11-14
Payer: COMMERCIAL

## 2023-11-14 VITALS
WEIGHT: 201.06 LBS | RESPIRATION RATE: 16 BRPM | OXYGEN SATURATION: 98 % | BODY MASS INDEX: 29.69 KG/M2 | HEART RATE: 90 BPM | DIASTOLIC BLOOD PRESSURE: 98 MMHG | TEMPERATURE: 98.6 F | SYSTOLIC BLOOD PRESSURE: 137 MMHG

## 2023-11-14 DIAGNOSIS — R42 DIZZINESS: Primary | ICD-10-CM

## 2023-11-14 LAB
ALBUMIN SERPL BCP-MCNC: 4.5 G/DL (ref 3.5–5)
ALP SERPL-CCNC: 64 U/L (ref 34–104)
ALT SERPL W P-5'-P-CCNC: 41 U/L (ref 7–52)
AMPHETAMINES SERPL QL SCN: NEGATIVE
ANION GAP SERPL CALCULATED.3IONS-SCNC: 5 MMOL/L
AST SERPL W P-5'-P-CCNC: 31 U/L (ref 13–39)
BARBITURATES UR QL: NEGATIVE
BASOPHILS # BLD AUTO: 0.16 THOUSANDS/ÂΜL (ref 0–0.1)
BASOPHILS NFR BLD AUTO: 1 % (ref 0–1)
BENZODIAZ UR QL: NEGATIVE
BILIRUB SERPL-MCNC: 0.5 MG/DL (ref 0.2–1)
BILIRUB UR QL STRIP: NEGATIVE
BUN SERPL-MCNC: 7 MG/DL (ref 5–25)
CALCIUM SERPL-MCNC: 9.5 MG/DL (ref 8.4–10.2)
CHLORIDE SERPL-SCNC: 101 MMOL/L (ref 96–108)
CLARITY UR: CLEAR
CO2 SERPL-SCNC: 30 MMOL/L (ref 21–32)
COCAINE UR QL: NEGATIVE
COLOR UR: YELLOW
CREAT SERPL-MCNC: 0.89 MG/DL (ref 0.6–1.3)
EOSINOPHIL # BLD AUTO: 0.52 THOUSAND/ÂΜL (ref 0–0.61)
EOSINOPHIL NFR BLD AUTO: 4 % (ref 0–6)
ERYTHROCYTE [DISTWIDTH] IN BLOOD BY AUTOMATED COUNT: 12.5 % (ref 11.6–15.1)
GFR SERPL CREATININE-BSD FRML MDRD: 118 ML/MIN/1.73SQ M
GLUCOSE SERPL-MCNC: 87 MG/DL (ref 65–140)
GLUCOSE SERPL-MCNC: 94 MG/DL (ref 65–140)
GLUCOSE UR STRIP-MCNC: NEGATIVE MG/DL
HCT VFR BLD AUTO: 46.6 % (ref 36.5–49.3)
HGB BLD-MCNC: 15.7 G/DL (ref 12–17)
HGB UR QL STRIP.AUTO: NEGATIVE
IMM GRANULOCYTES # BLD AUTO: 0.22 THOUSAND/UL (ref 0–0.2)
IMM GRANULOCYTES NFR BLD AUTO: 2 % (ref 0–2)
KETONES UR STRIP-MCNC: NEGATIVE MG/DL
LEUKOCYTE ESTERASE UR QL STRIP: NEGATIVE
LYMPHOCYTES # BLD AUTO: 3.41 THOUSANDS/ÂΜL (ref 0.6–4.47)
LYMPHOCYTES NFR BLD AUTO: 26 % (ref 14–44)
MCH RBC QN AUTO: 29.8 PG (ref 26.8–34.3)
MCHC RBC AUTO-ENTMCNC: 33.7 G/DL (ref 31.4–37.4)
MCV RBC AUTO: 89 FL (ref 82–98)
MONOCYTES # BLD AUTO: 1.26 THOUSAND/ÂΜL (ref 0.17–1.22)
MONOCYTES NFR BLD AUTO: 10 % (ref 4–12)
NEUTROPHILS # BLD AUTO: 7.42 THOUSANDS/ÂΜL (ref 1.85–7.62)
NEUTS SEG NFR BLD AUTO: 57 % (ref 43–75)
NITRITE UR QL STRIP: NEGATIVE
NRBC BLD AUTO-RTO: 0 /100 WBCS
OPIATES UR QL SCN: NEGATIVE
OXYCODONE+OXYMORPHONE UR QL SCN: NEGATIVE
PCP UR QL: NEGATIVE
PH UR STRIP.AUTO: 7 [PH] (ref 4.5–8)
PLATELET # BLD AUTO: 418 THOUSANDS/UL (ref 149–390)
PMV BLD AUTO: 9 FL (ref 8.9–12.7)
POTASSIUM SERPL-SCNC: 4.1 MMOL/L (ref 3.5–5.3)
PROT SERPL-MCNC: 7.9 G/DL (ref 6.4–8.4)
PROT UR STRIP-MCNC: NEGATIVE MG/DL
RBC # BLD AUTO: 5.26 MILLION/UL (ref 3.88–5.62)
SODIUM SERPL-SCNC: 136 MMOL/L (ref 135–147)
SP GR UR STRIP.AUTO: 1.02 (ref 1–1.03)
THC UR QL: NEGATIVE
TSH SERPL DL<=0.05 MIU/L-ACNC: 3.39 UIU/ML (ref 0.45–4.5)
UROBILINOGEN UR QL STRIP.AUTO: 0.2 E.U./DL
WBC # BLD AUTO: 12.99 THOUSAND/UL (ref 4.31–10.16)

## 2023-11-14 PROCEDURE — 85025 COMPLETE CBC W/AUTO DIFF WBC: CPT | Performed by: EMERGENCY MEDICINE

## 2023-11-14 PROCEDURE — 96361 HYDRATE IV INFUSION ADD-ON: CPT

## 2023-11-14 PROCEDURE — 36415 COLL VENOUS BLD VENIPUNCTURE: CPT | Performed by: EMERGENCY MEDICINE

## 2023-11-14 PROCEDURE — 99284 EMERGENCY DEPT VISIT MOD MDM: CPT

## 2023-11-14 PROCEDURE — 80053 COMPREHEN METABOLIC PANEL: CPT | Performed by: EMERGENCY MEDICINE

## 2023-11-14 PROCEDURE — 96360 HYDRATION IV INFUSION INIT: CPT

## 2023-11-14 PROCEDURE — 99284 EMERGENCY DEPT VISIT MOD MDM: CPT | Performed by: EMERGENCY MEDICINE

## 2023-11-14 PROCEDURE — 70450 CT HEAD/BRAIN W/O DYE: CPT

## 2023-11-14 PROCEDURE — 84443 ASSAY THYROID STIM HORMONE: CPT | Performed by: EMERGENCY MEDICINE

## 2023-11-14 PROCEDURE — 80307 DRUG TEST PRSMV CHEM ANLYZR: CPT | Performed by: EMERGENCY MEDICINE

## 2023-11-14 PROCEDURE — 82948 REAGENT STRIP/BLOOD GLUCOSE: CPT

## 2023-11-14 PROCEDURE — 81003 URINALYSIS AUTO W/O SCOPE: CPT

## 2023-11-14 RX ADMIN — SODIUM CHLORIDE 1000 ML: 0.9 INJECTION, SOLUTION INTRAVENOUS at 12:49

## 2023-11-14 NOTE — Clinical Note
Claudette Carmin was seen and treated in our emergency department on 11/14/2023. Diagnosis:     Acacia Caicedo  may return to work on return date. He may return on this date: 11/15/2023         If you have any questions or concerns, please don't hesitate to call.       Nurys Galindo,     ______________________________           _______________          _______________  Hospital Representative                              Date                                Time

## 2023-11-14 NOTE — ED PROVIDER NOTES
Pt Name: Sahra Cha  MRN: [id number]  [address] 2000  Age/Sex: 20 y.o. male  Date of evaluation: 11/14/2023  PCP: Beatris Austin, [address]    Chief Complaint   Patient presents with    Dizziness     Patient c/o dizziness, mouth dryness, cough, and feeling as if he is "off" over the past few weeks. Denies frequent urination. Denies drug use. Patient alert and oriented in triage but intermittently slow to answer questions. MOHINDER Mckay presents to the Emergency Department complaining of feeling dizzy and "fuzzy in the head". He had had similar episodes in the past and was seen here for it. He feels thirsty and thinks that he could be dehydrated. He does admit to some anxiety. 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 He has not been sick or exposed to any known illnesses.           Dizziness  Associated symptoms: no chest pain, no palpitations, no shortness of breath and no vomiting          Past Medical and Surgical History    Past Medical History:   Diagnosis Date    Allergic     Anxiety     Arthritis     Asthma     Asthma due to seasonal allergies     GERD (gastroesophageal reflux disease)     Headache(784.0)     HL (hearing loss)     Hypertension     Memory loss        Past Surgical History:   Procedure Laterality Date    COLONOSCOPY      NO PAST SURGERIES      UPPER GASTROINTESTINAL ENDOSCOPY      WISDOM TOOTH EXTRACTION         Family History   Problem Relation Age of Onset    Fibromyalgia Mother     Rheum arthritis Father        Social History     Tobacco Use    Smoking status: Former     Packs/day: 0.25     Types: Cigarettes     Quit date: 12/4/2019     Years since quitting: 3.9    Smokeless tobacco: Never    Tobacco comments:     vapes   Vaping Use    Vaping Use: Every day    Start date: 2/17/2014    Substances: Nicotine, Flavoring   Substance Use Topics    Alcohol use: Not Currently     Comment: every few months Drug use: No         .    Allergies    Allergies   Allergen Reactions    Nuts - Food Allergy Anaphylaxis    Other Anaphylaxis     Tree nuts  Yogurt  Carrots  Limes    Shellfish-Derived Products - Food Allergy Anaphylaxis    Latex Itching    Peanut Oil - Food Allergy     Penicillins      Family allergy        Home Medications    Prior to Admission medications    Medication Sig Start Date End Date Taking? Authorizing Provider   albuterol (2.5 mg/3 mL) 0.083 % nebulizer solution Take 3 mL (2.5 mg total) by nebulization every 6 (six) hours as needed for wheezing or shortness of breath 12/12/22   Lauren Ros, DO   albuterol (ACCUNEB) 1.25 MG/3ML nebulizer solution Inhale 1 ampule every 6 (six) hours as needed    Historical Provider, MD   albuterol (Ventolin HFA) 90 mcg/act inhaler Inhale 2 puffs every 6 (six) hours as needed for wheezing 4/6/21   Lauren Ros, DO   buPROPion (WELLBUTRIN XL) 150 mg 24 hr tablet take 1 tablet by mouth every morning 4/23/23   Lauren Ros, DO   EPINEPHrine (EPIPEN) 0.3 mg/0.3 mL SOAJ EpiPen 2-Jayme 0.3 MG/0.3ML Injection Solution Auto-injector  Use as directed, prn allergic reaction   Quantity: 1;  Refills: 1      Quincy Ignacio DO ;  Start 31-July-2014  Active  2 Solution Auto-injector Pen 7/31/14   Historical Provider, MD   ofloxacin (FLOXIN) 0.3 % otic solution Administer 10 drops into the left ear daily  Patient not taking: Reported on 11/5/2023 9/14/23   Earnest ShillingsJANAK   omeprazole (PriLOSEC) 40 MG capsule Take 1 capsule (40 mg total) by mouth every evening 10/25/23   JANAK Looney   triamcinolone (KENALOG) 0.1 % cream Apply topically 2 (two) times a day 9/14/23   Earnest Shillings CRNP   valsartan (DIOVAN) 80 mg tablet take 1 tablet by mouth once daily 9/7/23   Lauren Ros, DO           Review of Systems    Review of Systems   Constitutional:  Negative for chills and fever. HENT:  Negative for ear pain and sore throat.     Eyes:  Negative for pain and visual disturbance. Respiratory:  Negative for cough and shortness of breath. Cardiovascular:  Negative for chest pain and palpitations. Gastrointestinal:  Negative for abdominal pain and vomiting. Genitourinary:  Negative for dysuria and hematuria. Musculoskeletal:  Negative for arthralgias and back pain. Skin:  Negative for color change and rash. Neurological:  Positive for dizziness. Negative for seizures and syncope. Psychiatric/Behavioral:  Positive for confusion. All other systems reviewed and are negative. Physical Exam      ED Triage Vitals   Temperature Pulse Respirations Blood Pressure SpO2   11/14/23 1203 11/14/23 1203 11/14/23 1203 11/14/23 1203 11/14/23 1203   98.6 °F (37 °C) (!) 106 18 (!) 184/105 99 %      Temp Source Heart Rate Source Patient Position - Orthostatic VS BP Location FiO2 (%)   11/14/23 1203 11/14/23 1203 11/14/23 1203 11/14/23 1203 --   Oral Monitor Sitting Right arm       Pain Score       11/14/23 1249       No Pain               Physical Exam  Vitals and nursing note reviewed. Constitutional:       General: He is not in acute distress. Appearance: He is well-developed. He is not diaphoretic. HENT:      Head: Normocephalic and atraumatic. Nose: Nose normal.   Eyes:      Conjunctiva/sclera: Conjunctivae normal.      Pupils: Pupils are equal, round, and reactive to light. Cardiovascular:      Rate and Rhythm: Normal rate and regular rhythm. Heart sounds: Normal heart sounds. No murmur heard. No friction rub. No gallop. Pulmonary:      Effort: Pulmonary effort is normal. No respiratory distress. Breath sounds: Normal breath sounds. No wheezing or rales. Abdominal:      General: Bowel sounds are normal.      Palpations: Abdomen is soft. Tenderness: There is no abdominal tenderness. There is no guarding or rebound. Musculoskeletal:         General: Normal range of motion. Cervical back: Normal range of motion and neck supple. Skin:     General: Skin is warm and dry. Neurological:      Mental Status: He is alert and oriented to person, place, and time. Psychiatric:         Behavior: Behavior normal.           Assessment and Plan    Bell Davidson is a 20 y.o. male who presents with dizziness and feeling "off". . Physical examination unremarkable. DDX includes but is not limited to: Dehydration, electrolyte abnormality, deconditioning, CVA/TIA, infection, anemia, thyroid disorder, cardiac disorder, dysrhythmia. Plan will be to perform diagnostic testing and treat symptomatically.       MDM      Diagnostic Results      Labs:    Results for orders placed or performed during the hospital encounter of 11/14/23   CBC and differential   Result Value Ref Range    WBC 12.99 (H) 4.31 - 10.16 Thousand/uL    RBC 5.26 3.88 - 5.62 Million/uL    Hemoglobin 15.7 12.0 - 17.0 g/dL    Hematocrit 46.6 36.5 - 49.3 %    MCV 89 82 - 98 fL    MCH 29.8 26.8 - 34.3 pg    MCHC 33.7 31.4 - 37.4 g/dL    RDW 12.5 11.6 - 15.1 %    MPV 9.0 8.9 - 12.7 fL    Platelets 757 (H) 665 - 390 Thousands/uL    nRBC 0 /100 WBCs    Neutrophils Relative 57 43 - 75 %    Immat GRANS % 2 0 - 2 %    Lymphocytes Relative 26 14 - 44 %    Monocytes Relative 10 4 - 12 %    Eosinophils Relative 4 0 - 6 %    Basophils Relative 1 0 - 1 %    Neutrophils Absolute 7.42 1.85 - 7.62 Thousands/µL    Immature Grans Absolute 0.22 (H) 0.00 - 0.20 Thousand/uL    Lymphocytes Absolute 3.41 0.60 - 4.47 Thousands/µL    Monocytes Absolute 1.26 (H) 0.17 - 1.22 Thousand/µL    Eosinophils Absolute 0.52 0.00 - 0.61 Thousand/µL    Basophils Absolute 0.16 (H) 0.00 - 0.10 Thousands/µL   Comprehensive metabolic panel   Result Value Ref Range    Sodium 136 135 - 147 mmol/L    Potassium 4.1 3.5 - 5.3 mmol/L    Chloride 101 96 - 108 mmol/L    CO2 30 21 - 32 mmol/L    ANION GAP 5 mmol/L    BUN 7 5 - 25 mg/dL    Creatinine 0.89 0.60 - 1.30 mg/dL    Glucose 87 65 - 140 mg/dL    Calcium 9.5 8.4 - 10.2 mg/dL    AST 31 13 - 39 U/L    ALT 41 7 - 52 U/L    Alkaline Phosphatase 64 34 - 104 U/L    Total Protein 7.9 6.4 - 8.4 g/dL    Albumin 4.5 3.5 - 5.0 g/dL    Total Bilirubin 0.50 0.20 - 1.00 mg/dL    eGFR 118 ml/min/1.73sq m   Rapid drug screen, urine   Result Value Ref Range    Amph/Meth UR Negative Negative    Barbiturate Ur Negative Negative    Benzodiazepine Urine Negative Negative    Cocaine Urine Negative Negative    Methadone Urine      Opiate Urine Negative Negative    PCP Ur Negative Negative    THC Urine Negative Negative    Oxycodone Urine Negative Negative   TSH, 3rd generation with Free T4 reflex   Result Value Ref Range    TSH 3RD GENERATON 3.388 0.450 - 4.500 uIU/mL   Fingerstick Glucose (POCT)   Result Value Ref Range    POC Glucose 94 65 - 140 mg/dl   Urine Macroscopic, POC   Result Value Ref Range    Color, UA Yellow     Clarity, UA Clear     pH, UA 7.0 4.5 - 8.0    Leukocytes, UA Negative Negative    Nitrite, UA Negative Negative    Protein, UA Negative Negative mg/dl    Glucose, UA Negative Negative mg/dl    Ketones, UA Negative Negative mg/dl    Urobilinogen, UA 0.2 0.2, 1.0 E.U./dl E.U./dl    Bilirubin, UA Negative Negative    Occult Blood, UA Negative Negative    Specific Gravity, UA 1.020 1.003 - 1.030       All labs reviewed and utilized in the medical decision making process    Radiology:    CT head wo contrast   Final Result      No acute intracranial hemorrhage, mass effect or edema. Workstation performed: [id number]             All radiology studies independently viewed by me and interpreted by the radiologist.    Procedure    Procedures      ED Course of Care and Re-Assessments    Patient was feeling better after IVF.         Medications   sodium chloride 0.9 % bolus 1,000 mL (0 mL Intravenous Stopped 11/14/23 1502)           FINAL IMPRESSION    Final diagnoses:   Dizziness         DISPOSITION/PLAN    Time reflects when diagnosis was documented in both MDM as applicable and the Disposition within this note       Time User Action Codes Description Comment    11/14/2023  2:03 PM Inna Scott [[id number]] Dizziness           ED Disposition       ED Disposition   Discharge    Condition   Stable    Date/Time   Tue Nov 14, 2023  2:03 PM    [address] discharge to home/self care.                    Follow-up Information       Follow up With Specialties Details Why Contact Info    JANAK Pichardo Nurse Practitioner, Family Medicine Schedule an appointment as soon as possible for a visit   [address] [place] [address], [address] [facility]  [phone]                PATIENT REFERRED TO:    Lambert Butler, [address]  [phone]    Schedule an appointment as soon as possible for a visit         DISCHARGE MEDICATIONS:    Discharge Medication List as of 11/14/2023  2:05 PM        CONTINUE these medications which have NOT CHANGED    Details   albuterol (2.5 mg/3 mL) 0.083 % nebulizer solution Take 3 mL (2.5 mg total) by nebulization every 6 (six) hours as needed for wheezing or shortness of breath, Starting Mon 12/12/2022, Normal      albuterol (ACCUNEB) 1.25 MG/3ML nebulizer solution Inhale 1 ampule every 6 (six) hours as needed, Historical Med      albuterol (Ventolin HFA) 90 mcg/act inhaler Inhale 2 puffs every 6 (six) hours as needed for wheezing, Starting Tue 4/6/2021, Normal      buPROPion (WELLBUTRIN XL) 150 mg 24 hr tablet take 1 tablet by mouth every morning, Normal      EPINEPHrine (EPIPEN) 0.3 mg/0.3 mL SOAJ EpiPen 2-Jayme 0.3 MG/0.3ML Injection Solution Auto-injector  Use as directed, prn allergic reaction   Quantity: 1;  Refills: 1      Quincy Ignacio DO ;  Start 31-July-2014  Active  2 Solution Auto-injector Pen, Historical Med      omeprazole (PriLOSEC) 40 MG capsule Take 1 capsule (40 mg total) by mouth every evening, Starting Wed 10/25/2023, Normal      valsartan (DIOVAN) 80 mg tablet take 1 tablet by mouth once daily, Starting Thu 9/7/2023, Normal      ofloxacin (FLOXIN) 0.3 % otic solution Administer 10 drops into the left ear daily, Starting Thu 9/14/2023, Normal      triamcinolone (KENALOG) 0.1 % cream Apply topically 2 (two) times a day, Starting Thu 9/14/2023, Normal             No discharge procedures on file.          Inna Chapman, [address], DO  11/14/23 4330

## 2023-11-14 NOTE — TELEPHONE ENCOUNTER
Called patient and left a message in regard to seeing if patient would like to move his appointment up to 4pm due to another patient canceling and that time slot becoming available.

## 2023-11-15 ENCOUNTER — OFFICE VISIT (OUTPATIENT)
Age: 23
End: 2023-11-15
Payer: COMMERCIAL

## 2023-11-15 VITALS
DIASTOLIC BLOOD PRESSURE: 86 MMHG | BODY MASS INDEX: 29.91 KG/M2 | HEIGHT: 69 IN | SYSTOLIC BLOOD PRESSURE: 130 MMHG | OXYGEN SATURATION: 98 % | WEIGHT: 201.94 LBS | RESPIRATION RATE: 16 BRPM | HEART RATE: 88 BPM

## 2023-11-15 DIAGNOSIS — D72.829 LEUKOCYTOSIS, UNSPECIFIED TYPE: ICD-10-CM

## 2023-11-15 DIAGNOSIS — R79.89 ELEVATED PLATELET COUNT: ICD-10-CM

## 2023-11-15 DIAGNOSIS — F41.1 GENERALIZED ANXIETY DISORDER: Primary | ICD-10-CM

## 2023-11-15 DIAGNOSIS — J45.40 MODERATE PERSISTENT ASTHMA, UNSPECIFIED WHETHER COMPLICATED: ICD-10-CM

## 2023-11-15 DIAGNOSIS — R68.2 DRY MOUTH: ICD-10-CM

## 2023-11-15 PROCEDURE — 99214 OFFICE O/P EST MOD 30 MIN: CPT | Performed by: NURSE PRACTITIONER

## 2023-11-15 RX ORDER — BUPROPION HYDROCHLORIDE 300 MG/1
300 TABLET ORAL EVERY MORNING
Qty: 90 TABLET | Refills: 3 | Status: SHIPPED | OUTPATIENT
Start: 2023-11-15

## 2023-11-15 RX ORDER — ALBUTEROL SULFATE 90 UG/1
2 AEROSOL, METERED RESPIRATORY (INHALATION) EVERY 6 HOURS PRN
Qty: 6.7 G | Refills: 5 | Status: SHIPPED | OUTPATIENT
Start: 2023-11-15

## 2023-11-15 NOTE — PATIENT INSTRUCTIONS
1) Try Biotene mouthwash/products  2) Complete lab work prior to follow-up visit (at least 2-3 days)  3) Increase Wellbutrin to 300mg daily

## 2023-11-16 PROBLEM — R68.2 DRY MOUTH: Status: ACTIVE | Noted: 2023-11-16

## 2023-11-16 NOTE — ASSESSMENT & PLAN NOTE
After our discussion, patient decided he would like to increase his Wellbutrin dosing prior to switching to a SSRI. He states " the Wellbutrin used to help me in the past so maybe increasing the dose will help". Wellbutrin  mg tablets sent to pharmacy on file. Discussed side effect profile, patient verbalized understanding. 1 month follow-up to assess progress  Discussed what signs and symptoms warrant emergent medical care. Patient verbalized understanding.

## 2023-11-16 NOTE — PROGRESS NOTES
Name: Yaneli Underwood      : 2000      MRN: [id number]  Encounter Provider: JANAK Curiel  Encounter Date: 11/15/2023   Encounter department: [address] [place] [address] specific charting below    Will recheck CBC w/ diff prior to 1 month follow-up to reassess WBC and platelet counts    1. Generalized anxiety disorder  Assessment & Plan:  After our discussion, patient decided he would like to increase his Wellbutrin dosing prior to switching to a SSRI. He states " the Wellbutrin used to help me in the past so maybe increasing the dose will help". Wellbutrin  mg tablets sent to pharmacy on file. Discussed side effect profile, patient verbalized understanding. 1 month follow-up to assess progress  Discussed what signs and symptoms warrant emergent medical care. Patient verbalized understanding. Orders:  -     buPROPion (WELLBUTRIN XL) 300 mg 24 hr tablet; Take 1 tablet (300 mg total) by mouth every morning    2. Leukocytosis, unspecified type  -     CBC and differential; Future; Expected date: 2023    3. Elevated platelet count  -     CBC and differential; Future; Expected date: 2023    4. Moderate persistent asthma, unspecified whether complicated  Assessment & Plan:  Albuterol refilled per request.  No respiratory complaints today. Orders:  -     albuterol (Ventolin HFA) 90 mcg/act inhaler; Inhale 2 puffs every 6 (six) hours as needed for wheezing    5. Dry mouth  Assessment & Plan:  Oral mucosa appears moist and red/pink. No thrush noted. Advised patient to start using OTC Biotene mouthwash. Discussed what signs and symptoms warrant emergent medical care. Patient verbalized understanding. Will reassess at 1 month follow-up, will refer to ENT prn. Subjective      Follow up to ER visit.  Patient visited ER on 23 for "Patient c/o dizziness, mouth dryness, cough, and feeling as if he is off over the past few weeks. Denies frequent urination. Denies drug use. Patient alert and oriented in triage but intermittently slow to answer questions." Workup including lab work and CT of head was unremarkable other than mild elevation noted in WBC and platelet count. Patient has had several ER visits over the past few years for similar complaints, workup "always negative", etiology unknown. Today, patient reports "I feel a lot better overall". Dizziness has "almost resolved" and patient is "feeling more with it". Patient states "I think it's all related to my anxiety. .. I had a similar experience in the past when I had a panic attack"  Expressing interest in increasing Wellbutrin or switching to a SSRI. Also reports dry mouth is still present. Review of Systems   Constitutional: Negative. HENT: Negative.          + dry mouth   Eyes: Negative. Respiratory: Negative. Negative for chest tightness and shortness of breath. Cardiovascular: Negative. Negative for chest pain. Gastrointestinal: Negative. Endocrine: Negative. Genitourinary: Negative. Musculoskeletal: Negative. Skin: Negative. Allergic/Immunologic: Negative. Neurological: Negative. Negative for dizziness and light-headedness. Hematological: Negative. Psychiatric/Behavioral:  Positive for decreased concentration. The patient is nervous/anxious.         Current Outpatient Medications on File Prior to Visit   Medication Sig   • albuterol (2.5 mg/3 mL) 0.083 % nebulizer solution Take 3 mL (2.5 mg total) by nebulization every 6 (six) hours as needed for wheezing or shortness of breath   • albuterol (ACCUNEB) 1.25 MG/3ML nebulizer solution Inhale 1 ampule every 6 (six) hours as needed   • omeprazole (PriLOSEC) 40 MG capsule Take 1 capsule (40 mg total) by mouth every evening   • valsartan (DIOVAN) 80 mg tablet take 1 tablet by mouth once daily   • EPINEPHrine (EPIPEN) 0.3 mg/0.3 mL SOAJ EpiPen 2-Jayme 0.3 MG/0.3ML Injection Solution Auto-injector  Use as directed, prn allergic reaction   Quantity: 1;  Refills: 1      Sandee DEE, Gayle Roll ;  Start 31-July-2014  Active  2 Solution Auto-injector Pen (Patient not taking: Reported on 11/15/2023)   • ofloxacin (FLOXIN) 0.3 % otic solution Administer 10 drops into the left ear daily (Patient not taking: Reported on 11/5/2023)   • triamcinolone (KENALOG) 0.1 % cream Apply topically 2 (two) times a day (Patient not taking: Reported on 11/14/2023)       Objective     /86 (BP Location: Left arm, Patient Position: Sitting, Cuff Size: Large)   Pulse 88   Resp 16   Ht 5' 9" (1.753 m)   Wt 91.6 kg (201 lb 15.1 oz)   SpO2 98%   BMI 29.82 kg/m²     Physical Exam  Constitutional:       General: He is not in acute distress. Appearance: Normal appearance. HENT:      Mouth/Throat:      Mouth: Mucous membranes are moist.      Pharynx: Oropharynx is clear. No oropharyngeal exudate or posterior oropharyngeal erythema. Eyes:      Extraocular Movements: Extraocular movements intact. Conjunctiva/sclera: Conjunctivae normal.   Cardiovascular:      Rate and Rhythm: Normal rate and regular rhythm. Heart sounds: Normal heart sounds. Pulmonary:      Effort: Pulmonary effort is normal. No respiratory distress. Breath sounds: Normal breath sounds. No wheezing. Abdominal:      General: Abdomen is flat. Musculoskeletal:         General: Normal range of motion. Cervical back: Normal range of motion. Skin:     Findings: No erythema or rash. Neurological:      Mental Status: He is alert and oriented to person, place, and time.    Psychiatric:         Mood and Affect: Mood normal.         Behavior: Behavior normal.       JANAK Pichardo

## 2023-11-16 NOTE — ASSESSMENT & PLAN NOTE
Oral mucosa appears moist and red/pink. No thrush noted. Advised patient to start using OTC Biotene mouthwash. Discussed what signs and symptoms warrant emergent medical care. Patient verbalized understanding. Will reassess at 1 month follow-up, will refer to ENT prn.

## 2023-12-11 ENCOUNTER — APPOINTMENT (OUTPATIENT)
Age: 23
End: 2023-12-11
Payer: COMMERCIAL

## 2023-12-11 DIAGNOSIS — R79.89 ELEVATED PLATELET COUNT: ICD-10-CM

## 2023-12-11 DIAGNOSIS — D72.829 LEUKOCYTOSIS, UNSPECIFIED TYPE: ICD-10-CM

## 2023-12-11 LAB
BASOPHILS # BLD AUTO: 0.09 THOUSANDS/ÂΜL (ref 0–0.1)
BASOPHILS NFR BLD AUTO: 1 % (ref 0–1)
EOSINOPHIL # BLD AUTO: 0.44 THOUSAND/ÂΜL (ref 0–0.61)
EOSINOPHIL NFR BLD AUTO: 5 % (ref 0–6)
ERYTHROCYTE [DISTWIDTH] IN BLOOD BY AUTOMATED COUNT: 12.2 % (ref 11.6–15.1)
HCT VFR BLD AUTO: 44.6 % (ref 36.5–49.3)
HGB BLD-MCNC: 14.7 G/DL (ref 12–17)
IMM GRANULOCYTES # BLD AUTO: 0.03 THOUSAND/UL (ref 0–0.2)
IMM GRANULOCYTES NFR BLD AUTO: 0 % (ref 0–2)
LYMPHOCYTES # BLD AUTO: 2.69 THOUSANDS/ÂΜL (ref 0.6–4.47)
LYMPHOCYTES NFR BLD AUTO: 33 % (ref 14–44)
MCH RBC QN AUTO: 29.5 PG (ref 26.8–34.3)
MCHC RBC AUTO-ENTMCNC: 33 G/DL (ref 31.4–37.4)
MCV RBC AUTO: 90 FL (ref 82–98)
MONOCYTES # BLD AUTO: 0.67 THOUSAND/ÂΜL (ref 0.17–1.22)
MONOCYTES NFR BLD AUTO: 8 % (ref 4–12)
NEUTROPHILS # BLD AUTO: 4.24 THOUSANDS/ÂΜL (ref 1.85–7.62)
NEUTS SEG NFR BLD AUTO: 53 % (ref 43–75)
NRBC BLD AUTO-RTO: 0 /100 WBCS
PLATELET # BLD AUTO: 417 THOUSANDS/UL (ref 149–390)
PMV BLD AUTO: 9.5 FL (ref 8.9–12.7)
RBC # BLD AUTO: 4.98 MILLION/UL (ref 3.88–5.62)
WBC # BLD AUTO: 8.16 THOUSAND/UL (ref 4.31–10.16)

## 2023-12-11 PROCEDURE — 85025 COMPLETE CBC W/AUTO DIFF WBC: CPT

## 2023-12-11 PROCEDURE — 36415 COLL VENOUS BLD VENIPUNCTURE: CPT

## 2023-12-13 ENCOUNTER — VBI (OUTPATIENT)
Dept: ADMINISTRATIVE | Facility: OTHER | Age: 23
End: 2023-12-13

## 2023-12-18 ENCOUNTER — HOSPITAL ENCOUNTER (EMERGENCY)
Facility: HOSPITAL | Age: 23
Discharge: HOME | End: 2023-12-19
Attending: EMERGENCY MEDICINE | Admitting: EMERGENCY MEDICINE
Payer: COMMERCIAL

## 2023-12-18 DIAGNOSIS — R55 NEAR SYNCOPE: Primary | ICD-10-CM

## 2023-12-18 PROBLEM — R79.89 ELEVATED PLATELET COUNT: Status: ACTIVE | Noted: 2023-12-18

## 2023-12-18 LAB
ALBUMIN SERPL BCP-MCNC: 4.4 G/DL (ref 3.5–5)
ALP SERPL-CCNC: 56 U/L (ref 34–104)
ALT SERPL W P-5'-P-CCNC: 20 U/L (ref 7–52)
ANION GAP SERPL CALCULATED.3IONS-SCNC: 7 MMOL/L
AST SERPL W P-5'-P-CCNC: 20 U/L (ref 13–39)
BASOPHILS # BLD AUTO: 0.13 THOUSANDS/ÂΜL (ref 0–0.1)
BASOPHILS NFR BLD AUTO: 1 % (ref 0–1)
BILIRUB SERPL-MCNC: 0.32 MG/DL (ref 0.2–1)
BUN SERPL-MCNC: 8 MG/DL (ref 5–25)
CALCIUM SERPL-MCNC: 8.9 MG/DL (ref 8.4–10.2)
CARDIAC TROPONIN I PNL SERPL HS: <2 NG/L
CHLORIDE SERPL-SCNC: 105 MMOL/L (ref 96–108)
CO2 SERPL-SCNC: 24 MMOL/L (ref 21–32)
CREAT SERPL-MCNC: 0.89 MG/DL (ref 0.6–1.3)
EOSINOPHIL # BLD AUTO: 0.73 THOUSAND/ÂΜL (ref 0–0.61)
EOSINOPHIL NFR BLD AUTO: 5 % (ref 0–6)
ERYTHROCYTE [DISTWIDTH] IN BLOOD BY AUTOMATED COUNT: 12.1 % (ref 11.6–15.1)
GFR SERPL CREATININE-BSD FRML MDRD: 118 ML/MIN/1.73SQ M
GLUCOSE SERPL-MCNC: 88 MG/DL (ref 65–140)
HCT VFR BLD AUTO: 44.4 % (ref 36.5–49.3)
HGB BLD-MCNC: 15.1 G/DL (ref 12–17)
IMM GRANULOCYTES # BLD AUTO: 0.06 THOUSAND/UL (ref 0–0.2)
IMM GRANULOCYTES NFR BLD AUTO: 0 % (ref 0–2)
LYMPHOCYTES # BLD AUTO: 3.25 THOUSANDS/ÂΜL (ref 0.6–4.47)
LYMPHOCYTES NFR BLD AUTO: 22 % (ref 14–44)
MCH RBC QN AUTO: 30.1 PG (ref 26.8–34.3)
MCHC RBC AUTO-ENTMCNC: 34 G/DL (ref 31.4–37.4)
MCV RBC AUTO: 89 FL (ref 82–98)
MONOCYTES # BLD AUTO: 1.19 THOUSAND/ÂΜL (ref 0.17–1.22)
MONOCYTES NFR BLD AUTO: 8 % (ref 4–12)
NEUTROPHILS # BLD AUTO: 9.3 THOUSANDS/ÂΜL (ref 1.85–7.62)
NEUTS SEG NFR BLD AUTO: 64 % (ref 43–75)
NRBC BLD AUTO-RTO: 0 /100 WBCS
PLATELET # BLD AUTO: 366 THOUSANDS/UL (ref 149–390)
PMV BLD AUTO: 8.9 FL (ref 8.9–12.7)
POTASSIUM SERPL-SCNC: 3.6 MMOL/L (ref 3.5–5.3)
PROT SERPL-MCNC: 6.9 G/DL (ref 6.4–8.4)
RBC # BLD AUTO: 5.01 MILLION/UL (ref 3.88–5.62)
SODIUM SERPL-SCNC: 136 MMOL/L (ref 135–147)
WBC # BLD AUTO: 14.66 THOUSAND/UL (ref 4.31–10.16)

## 2023-12-18 PROCEDURE — 93005 ELECTROCARDIOGRAM TRACING: CPT

## 2023-12-18 PROCEDURE — 36415 COLL VENOUS BLD VENIPUNCTURE: CPT | Performed by: PHYSICIAN ASSISTANT

## 2023-12-18 PROCEDURE — 85025 COMPLETE CBC W/AUTO DIFF WBC: CPT | Performed by: PHYSICIAN ASSISTANT

## 2023-12-18 PROCEDURE — 96360 HYDRATION IV INFUSION INIT: CPT

## 2023-12-18 PROCEDURE — 99284 EMERGENCY DEPT VISIT MOD MDM: CPT

## 2023-12-18 PROCEDURE — 84484 ASSAY OF TROPONIN QUANT: CPT | Performed by: PHYSICIAN ASSISTANT

## 2023-12-18 PROCEDURE — 99284 EMERGENCY DEPT VISIT MOD MDM: CPT | Performed by: PHYSICIAN ASSISTANT

## 2023-12-18 PROCEDURE — 80053 COMPREHEN METABOLIC PANEL: CPT | Performed by: PHYSICIAN ASSISTANT

## 2023-12-18 RX ADMIN — SODIUM CHLORIDE 1000 ML: 0.9 INJECTION, SOLUTION INTRAVENOUS at 22:59

## 2023-12-18 NOTE — PROGRESS NOTES
"Name: Darnell Gottlieb      : 2000      MRN: [id number]  Encounter Provider: JANAK Farnsworth  Encounter Date: 2023   Encounter department: [facility] PRIMARY CARE    Assessment & Plan     1. Vasovagal syncope  Assessment & Plan:  Patient admitted to the ED yesterday for syncopal episode.  Workup was benign.  Diagnosed with vasovagal syncope as the patient had this event while going to the bathroom.    Patient denies any similar symptoms at this time.  He reports \"I feel so much better\".  Patient reports that he was drinking alcohol prior to the event.  He states that he has not drank alcohol for 1 to 2 years.  This likely was a contributing factor.  Advised the patient to avoid alcohol as he is also taking bupropion, verbalized understanding.    Educational handout provided on vasovagal syncope. Discussed what signs and symptoms warrant emergent medical care. Patient verbalized understanding.       Orders:  -     Comprehensive metabolic panel; Future    2. Generalized anxiety disorder  Assessment & Plan:  At last visit, patient's Wellbutrin was increased to 300 mg daily.  Patient states today \"I feel so much better... Back to normal\".  Will continue patient on this dose.  Encouraged patient to avoid alcohol consumption while taking this medication.    Orders:  -     Comprehensive metabolic panel; Future    3. Dry mouth  Assessment & Plan:  Patient reports trying four Biotene products without relief.  He is requesting a referral to ENT for further evaluation, order placed.  Patient also reporting generalized teeth pain while eating.  Patient indicates he is long overdue for a dental visit, encouraged him to contact his insurance company to find a dentist within his network.    Orders:  -     Ambulatory Referral to Otolaryngology; Future  -     Comprehensive metabolic panel; Future    4. Essential hypertension  Assessment & Plan:  Slightly elevated today at 140/84.  Patient's blood " "pressure was well within normal limits yesterday.  Will have patient return in 3 months for evaluation.    Continue DASH diet and aerobic exercise 3 times per week    Orders:  -     Comprehensive metabolic panel; Future    5. Elevated platelet count  Assessment & Plan:  Resolved in CBC drawn yesterday.  Likely reactive thrombocytosis.  Will recheck in 3 months with baseline labs.    Orders:  -     CBC and differential; Future    6. Food allergy  Assessment & Plan:  EpiPen refilled per patient request, patient has severe nut and shellfish allergy    Orders:  -     EPINEPHrine (EPIPEN) 0.3 mg/0.3 mL SOAJ; Inject 0.3 mL (0.3 mg total) into a muscle once for 1 dose           Subjective      Routine follow-up to reassess chronic health conditions, last visit 1 month ago  Primary concern today: syncopal episode yesterday    PER ED NOTE:    \"Patient presenting to the emergency department for evaluation of near syncope while on toilet.  Symptoms resolved upon arrival.  Likely vasovagal, however laboratory evaluation obtained to assess for alternate pathology.  EKG without arrhythmia or evidence of ischemia.  Troponin normal.  No evidence of dehydration on laboratory evaluation.  No anemia.  Patient was given fluids and feeling significant.  His symptoms are likely vasovagal in etiology.  He was discharged with instructions to follow-up with his primary care provider.  Strict return precautions were discussed.  He is in stable condition at time of discharge.\"     Review of Systems   Constitutional: Negative.  Negative for fatigue and fever.   HENT: Negative.          Dry mouth   Eyes: Negative.    Respiratory: Negative.     Cardiovascular: Negative.    Gastrointestinal: Negative.    Endocrine: Negative.    Genitourinary: Negative.    Musculoskeletal: Negative.    Skin: Negative.    Allergic/Immunologic: Negative.    Neurological: Negative.  Negative for dizziness, syncope, facial asymmetry, speech difficulty and " "light-headedness.   Hematological: Negative.    Psychiatric/Behavioral: Negative.         Current Outpatient Medications on File Prior to Visit   Medication Sig   • albuterol (2.5 mg/3 mL) 0.083 % nebulizer solution Take 3 mL (2.5 mg total) by nebulization every 6 (six) hours as needed for wheezing or shortness of breath   • albuterol (ACCUNEB) 1.25 MG/3ML nebulizer solution Inhale 1 ampule every 6 (six) hours as needed   • albuterol (Ventolin HFA) 90 mcg/act inhaler Inhale 2 puffs every 6 (six) hours as needed for wheezing   • buPROPion (WELLBUTRIN XL) 300 mg 24 hr tablet Take 1 tablet (300 mg total) by mouth every morning   • omeprazole (PriLOSEC) 40 MG capsule Take 1 capsule (40 mg total) by mouth every evening   • valsartan (DIOVAN) 80 mg tablet take 1 tablet by mouth once daily   • ofloxacin (FLOXIN) 0.3 % otic solution Administer 10 drops into the left ear daily (Patient not taking: Reported on 11/5/2023)   • triamcinolone (KENALOG) 0.1 % cream Apply topically 2 (two) times a day (Patient not taking: Reported on 11/14/2023)   • [DISCONTINUED] EPINEPHrine (EPIPEN) 0.3 mg/0.3 mL SOAJ EpiPen 2-Jayme 0.3 MG/0.3ML Injection Solution Auto-injector  Use as directed, prn allergic reaction   Quantity: 1;  Refills: 1      Quincy Ignacio DO ;  Start 31-July-2014  Active  2 Solution Auto-injector Pen (Patient not taking: Reported on 12/19/2023)       Objective     /84 (BP Location: Left arm, Patient Position: Sitting, Cuff Size: Large)   Pulse 78   Resp 18   Ht 5' 9\" (1.753 m)   Wt 87.8 kg (193 lb 9 oz)   SpO2 99%   BMI 28.58 kg/m²     Physical Exam  Constitutional:       General: He is not in acute distress.     Appearance: Normal appearance.   Eyes:      Extraocular Movements: Extraocular movements intact.      Conjunctiva/sclera: Conjunctivae normal.      Pupils: Pupils are equal, round, and reactive to light.   Cardiovascular:      Rate and Rhythm: Normal rate and regular rhythm.      Heart sounds: Normal " heart sounds.   Pulmonary:      Effort: Pulmonary effort is normal. No respiratory distress.      Breath sounds: Normal breath sounds. No wheezing.   Abdominal:      General: Abdomen is flat.   Musculoskeletal:         General: Normal range of motion.      Cervical back: Normal range of motion.   Skin:     Findings: No erythema or rash.   Neurological:      General: No focal deficit present.      Mental Status: He is alert and oriented to person, place, and time.   Psychiatric:         Mood and Affect: Mood normal. Affect is flat.         Behavior: Behavior normal.     JANAK Farnsworth

## 2023-12-19 ENCOUNTER — OFFICE VISIT (OUTPATIENT)
Age: 23
End: 2023-12-19
Payer: COMMERCIAL

## 2023-12-19 VITALS
BODY MASS INDEX: 28.67 KG/M2 | HEIGHT: 69 IN | DIASTOLIC BLOOD PRESSURE: 84 MMHG | OXYGEN SATURATION: 99 % | SYSTOLIC BLOOD PRESSURE: 140 MMHG | HEART RATE: 78 BPM | WEIGHT: 193.56 LBS | RESPIRATION RATE: 18 BRPM

## 2023-12-19 VITALS
TEMPERATURE: 98.9 F | HEIGHT: 69 IN | DIASTOLIC BLOOD PRESSURE: 69 MMHG | BODY MASS INDEX: 28.54 KG/M2 | SYSTOLIC BLOOD PRESSURE: 119 MMHG | HEART RATE: 83 BPM | OXYGEN SATURATION: 95 % | WEIGHT: 192.68 LBS | RESPIRATION RATE: 18 BRPM

## 2023-12-19 DIAGNOSIS — Z91.018 FOOD ALLERGY: ICD-10-CM

## 2023-12-19 DIAGNOSIS — F41.1 GENERALIZED ANXIETY DISORDER: ICD-10-CM

## 2023-12-19 DIAGNOSIS — R55 VASOVAGAL SYNCOPE: Primary | ICD-10-CM

## 2023-12-19 DIAGNOSIS — R68.2 DRY MOUTH: ICD-10-CM

## 2023-12-19 DIAGNOSIS — I10 ESSENTIAL HYPERTENSION: ICD-10-CM

## 2023-12-19 DIAGNOSIS — R79.89 ELEVATED PLATELET COUNT: ICD-10-CM

## 2023-12-19 LAB
ATRIAL RATE: 86 BPM
P AXIS: 51 DEGREES
PR INTERVAL: 162 MS
QRS AXIS: 32 DEGREES
QRSD INTERVAL: 96 MS
QT INTERVAL: 358 MS
QTC INTERVAL: 428 MS
T WAVE AXIS: 49 DEGREES
VENTRICULAR RATE: 86 BPM

## 2023-12-19 PROCEDURE — 99214 OFFICE O/P EST MOD 30 MIN: CPT | Performed by: NURSE PRACTITIONER

## 2023-12-19 RX ORDER — EPINEPHRINE 0.3 MG/.3ML
0.3 INJECTION SUBCUTANEOUS ONCE
Qty: 0.6 ML | Refills: 0 | Status: SHIPPED | OUTPATIENT
Start: 2023-12-19 | End: 2023-12-19

## 2023-12-19 NOTE — ASSESSMENT & PLAN NOTE
Slightly elevated today at 140/84.  Patient's blood pressure was well within normal limits yesterday.  Will have patient return in 3 months for evaluation.    Continue DASH diet and aerobic exercise 3 times per week

## 2023-12-19 NOTE — ED PROVIDER NOTES
History  Chief Complaint   Patient presents with    Syncope     Pt arrives via EMS. Pt reports diarrheal episode after dinner when started to get lightheaded and diaphoretic. Pt family reports slurred speech. Pt asymptomatic at this time.      Patient is a 23-year-old male with no significant past medical history presenting to the emergency department for evaluation of near syncope.  Patient states that he was on the toilet when he was having a bowel movement.  Patient was straining when he became very lightheaded.  He felt like he was going to pass out.  He states that his vision started getting blurry and he got very sweaty.  At no point did he have any chest pain or shortness of breath.  This is never happened to him before.  He states that he is currently feeling at baseline.  This episode lasted 2 to 3 minutes.  He is currently denying fevers, chills, chest pain, difficulty breathing, abdominal pain, nausea, vomiting, diarrhea.  No other complaints at this time.        Prior to Admission Medications   Prescriptions Last Dose Informant Patient Reported? Taking?   EPINEPHrine (EPIPEN) 0.3 mg/0.3 mL SOAJ Not Taking Self Yes No   Sig: EpiPen 2-Jayme 0.3 MG/0.3ML Injection Solution Auto-injector  Use as directed, prn allergic reaction   Quantity: 1;  Refills: 1      Quincy Ignacio DO ;  Start 31-July-2014  Active  2 Solution Auto-injector Pen   Patient not taking: Reported on 11/15/2023   albuterol (2.5 mg/3 mL) 0.083 % nebulizer solution Not Taking  No No   Sig: Take 3 mL (2.5 mg total) by nebulization every 6 (six) hours as needed for wheezing or shortness of breath   Patient not taking: Reported on 12/18/2023   albuterol (ACCUNEB) 1.25 MG/3ML nebulizer solution Not Taking Self Yes No   Sig: Inhale 1 ampule every 6 (six) hours as needed   Patient not taking: Reported on 12/18/2023   albuterol (Ventolin HFA) 90 mcg/act inhaler Not Taking  No No   Sig: Inhale 2 puffs every 6 (six) hours as needed for wheezing    Patient not taking: Reported on 2023   buPROPion (WELLBUTRIN XL) 300 mg 24 hr tablet   No Yes   Sig: Take 1 tablet (300 mg total) by mouth every morning   ofloxacin (FLOXIN) 0.3 % otic solution Not Taking  No No   Sig: Administer 10 drops into the left ear daily   Patient not taking: Reported on 2023   omeprazole (PriLOSEC) 40 MG capsule   No Yes   Sig: Take 1 capsule (40 mg total) by mouth every evening   triamcinolone (KENALOG) 0.1 % cream Not Taking  No No   Sig: Apply topically 2 (two) times a day   Patient not taking: Reported on 2023   valsartan (DIOVAN) 80 mg tablet   No Yes   Sig: take 1 tablet by mouth once daily      Facility-Administered Medications: None       Past Medical History:   Diagnosis Date    Allergic     Anxiety     Arthritis     Asthma     Asthma due to seasonal allergies     GERD (gastroesophageal reflux disease)     Headache(784.0)     HL (hearing loss)     Hypertension     Memory loss        Past Surgical History:   Procedure Laterality Date    COLONOSCOPY      NO PAST SURGERIES      UPPER GASTROINTESTINAL ENDOSCOPY      WISDOM TOOTH EXTRACTION         Family History   Problem Relation Age of Onset    Fibromyalgia Mother     Rheum arthritis Father      I have reviewed and agree with the history as documented.    E-Cigarette/Vaping    E-Cigarette Use Current Every Day User     Start Date 14     Cartridges/Day 1 cartridge every 2 weeks      E-Cigarette/Vaping Substances    Nicotine Yes     THC No     CBD No     Flavoring Yes     Other No     Unknown No      Social History     Tobacco Use    Smoking status: Former     Current packs/day: 0.00     Types: Cigarettes     Quit date: 2019     Years since quittin.0    Smokeless tobacco: Never    Tobacco comments:     vapes   Vaping Use    Vaping status: Every Day    Start date: 2014    Substances: Nicotine, Flavoring   Substance Use Topics    Alcohol use: Not Currently     Comment: every few months    Drug use:  No       Review of Systems   Constitutional:  Negative for chills and fever.   HENT:  Negative for congestion, rhinorrhea and sore throat.    Respiratory:  Negative for cough, chest tightness and shortness of breath.    Cardiovascular:  Negative for chest pain and palpitations.   Gastrointestinal:  Negative for abdominal pain, diarrhea, nausea and vomiting.   Neurological:  Positive for light-headedness. Negative for dizziness, syncope, facial asymmetry and weakness.   All other systems reviewed and are negative.      Physical Exam  Physical Exam  Vitals reviewed.   Constitutional:       General: He is not in acute distress.     Appearance: Normal appearance. He is normal weight. He is not ill-appearing, toxic-appearing or diaphoretic.   HENT:      Head: Normocephalic and atraumatic.      Right Ear: External ear normal.      Left Ear: External ear normal.   Eyes:      General: No scleral icterus.        Right eye: No discharge.         Left eye: No discharge.      Extraocular Movements: Extraocular movements intact.      Conjunctiva/sclera: Conjunctivae normal.   Cardiovascular:      Rate and Rhythm: Normal rate and regular rhythm.      Heart sounds: Normal heart sounds. No murmur heard.     No friction rub. No gallop.   Pulmonary:      Effort: Pulmonary effort is normal. No respiratory distress.      Breath sounds: Normal breath sounds. No stridor. No wheezing, rhonchi or rales.   Abdominal:      General: Abdomen is flat.      Palpations: Abdomen is soft.      Tenderness: There is no abdominal tenderness. There is no guarding or rebound.   Musculoskeletal:      Cervical back: Normal range of motion and neck supple.   Skin:     General: Skin is warm and dry.   Neurological:      Mental Status: He is alert and oriented to person, place, and time.   Psychiatric:         Mood and Affect: Mood normal.         Behavior: Behavior normal.         Vital Signs  ED Triage Vitals   Temperature Pulse Respirations Blood Pressure  SpO2   12/18/23 2239 12/18/23 2233 12/18/23 2233 12/18/23 2233 12/18/23 2233   98.9 °F (37.2 °C) 80 16 112/77 96 %      Temp Source Heart Rate Source Patient Position - Orthostatic VS BP Location FiO2 (%)   12/18/23 2239 12/18/23 2245 12/18/23 2233 12/18/23 2233 --   Oral Monitor Sitting Right arm       Pain Score       12/18/23 2230       No Pain           Vitals:    12/18/23 2233 12/18/23 2245 12/18/23 2330 12/19/23 0000   BP: 112/77 108/75 131/83 119/69   Pulse: 80 86 84 83   Patient Position - Orthostatic VS: Sitting Lying Lying Lying         Visual Acuity  Visual Acuity      Flowsheet Row Most Recent Value   L Pupil Size (mm) 3   R Pupil Size (mm) 3            ED Medications  Medications   sodium chloride 0.9 % bolus 1,000 mL (0 mL Intravenous Stopped 12/19/23 0008)       Diagnostic Studies  Results Reviewed       Procedure Component Value Units Date/Time    HS Troponin I 4hr [[id number]]     Lab Status: No result Specimen: Blood     HS Troponin I 2hr [[id number]]     Lab Status: No result Specimen: Blood     HS Troponin 0hr (reflex protocol) [[id number]]  (Normal) Collected: 12/18/23 2259    Lab Status: Final result Specimen: Blood from Arm, Left Updated: 12/18/23 2334     hs TnI 0hr <2 ng/L     Comprehensive metabolic panel [[id number]] Collected: 12/18/23 2259    Lab Status: Final result Specimen: Blood from Arm, Left Updated: 12/18/23 2329     Sodium 136 mmol/L      Potassium 3.6 mmol/L      Chloride 105 mmol/L      CO2 24 mmol/L      ANION GAP 7 mmol/L      BUN 8 mg/dL      Creatinine 0.89 mg/dL      Glucose 88 mg/dL      Calcium 8.9 mg/dL      AST 20 U/L      ALT 20 U/L      Alkaline Phosphatase 56 U/L      Total Protein 6.9 g/dL      Albumin 4.4 g/dL      Total Bilirubin 0.32 mg/dL      eGFR 118 ml/min/1.73sq m     Narrative:      Specimen Lipemic.  National Kidney Disease Foundation guidelines for Chronic Kidney Disease (CKD):     Stage 1 with normal or high GFR (GFR > 90 mL/min/1.73 square meters)    Stage  2 Mild CKD (GFR = 60-89 mL/min/1.73 square meters)    Stage 3A Moderate CKD (GFR = 45-59 mL/min/1.73 square meters)    Stage 3B Moderate CKD (GFR = 30-44 mL/min/1.73 square meters)    Stage 4 Severe CKD (GFR = 15-29 mL/min/1.73 square meters)    Stage 5 End Stage CKD (GFR <15 mL/min/1.73 square meters)  Note: GFR calculation is accurate only with a steady state creatinine    CBC and differential [[id number]]  (Abnormal) Collected: 12/18/23 2259    Lab Status: Final result Specimen: Blood from Arm, Left Updated: 12/18/23 2309     WBC 14.66 Thousand/uL      RBC 5.01 Million/uL      Hemoglobin 15.1 g/dL      Hematocrit 44.4 %      MCV 89 fL      MCH 30.1 pg      MCHC 34.0 g/dL      RDW 12.1 %      MPV 8.9 fL      Platelets 366 Thousands/uL      nRBC 0 /100 WBCs      Neutrophils Relative 64 %      Immat GRANS % 0 %      Lymphocytes Relative 22 %      Monocytes Relative 8 %      Eosinophils Relative 5 %      Basophils Relative 1 %      Neutrophils Absolute 9.30 Thousands/µL      Immature Grans Absolute 0.06 Thousand/uL      Lymphocytes Absolute 3.25 Thousands/µL      Monocytes Absolute 1.19 Thousand/µL      Eosinophils Absolute 0.73 Thousand/µL      Basophils Absolute 0.13 Thousands/µL                    No orders to display              Procedures  Procedures         ED Course                               SBIRT 20yo+      Flowsheet Row Most Recent Value   Initial Alcohol Screen: US AUDIT-C     1. How often do you have a drink containing alcohol? 1 Filed at: 12/18/2023 2236   2. How many drinks containing alcohol do you have on a typical day you are drinking?  0 Filed at: 12/18/2023 2236   3a. Male UNDER 65: How often do you have five or more drinks on one occasion? 0 Filed at: 12/18/2023 2236   3b. FEMALE Any Age, or MALE 65+: How often do you have 4 or more drinks on one occassion? 0 Filed at: 12/18/2023 2236   Audit-C Score 1 Filed at: 12/18/2023 2236   ASTRID: How many times in the past year have you...    Used an  illegal drug or used a prescription medication for non-medical reasons? Never Filed at: 12/18/2023 2236                      Medical Decision Making  Patient presenting to the emergency department for evaluation of near syncope while on toilet.  Symptoms resolved upon arrival.  Likely vasovagal, however laboratory evaluation obtained to assess for alternate pathology.  EKG without arrhythmia or evidence of ischemia.  Troponin normal.  No evidence of dehydration on laboratory evaluation.  No anemia.  Patient was given fluids and feeling significant.  His symptoms are likely vasovagal in etiology.  He was discharged with instructions to follow-up with his primary care provider.  Strict return precautions were discussed.  He is in stable condition at time of discharge.    Amount and/or Complexity of Data Reviewed  Labs: ordered.             Disposition  Final diagnoses:   Near syncope     Time reflects when diagnosis was documented in both MDM as applicable and the Disposition within this note       Time User Action Codes Description Comment    12/18/2023 11:44 PM Roldan Walton Add [R55] Near syncope           ED Disposition       ED Disposition   Discharge    Condition   Stable    Date/Time   Mon Dec 18, 2023 1815    Comment   Darnell Gottlieb discharge to home/self care.                   Follow-up Information       Follow up With Specialties Details Why Contact Info Additional Information    [facility] Emergency Department Emergency Medicine Go to  If symptoms worsen [address] [place] [id number] [phone] [facility] Emergency Department, [facility] [place], [place], [address]    JANAK Farnsworth Nurse Practitioner, Family Medicine Schedule an appointment as soon as possible for a visit  for follow up [address] [facility]  [address] [place] [address] [phone]               Patient's Medications   Discharge Prescriptions    No medications on  file       No discharge procedures on file.    PDMP Review       None            ED Provider  Electronically Signed by             Roldan Walton PA-C  12/19/23 0014

## 2023-12-19 NOTE — ASSESSMENT & PLAN NOTE
"At last visit, patient's Wellbutrin was increased to 300 mg daily.  Patient states today \"I feel so much better... Back to normal\".  Will continue patient on this dose.  Encouraged patient to avoid alcohol consumption while taking this medication.  "

## 2023-12-19 NOTE — ASSESSMENT & PLAN NOTE
Patient reports trying four Biotene products without relief.  He is requesting a referral to ENT for further evaluation, order placed.  Patient also reporting generalized teeth pain while eating.  Patient indicates he is long overdue for a dental visit, encouraged him to contact his insurance company to find a dentist within his network.

## 2023-12-19 NOTE — ASSESSMENT & PLAN NOTE
Resolved in CBC drawn yesterday.  Likely reactive thrombocytosis.  Will recheck in 3 months with baseline labs.

## 2023-12-19 NOTE — ASSESSMENT & PLAN NOTE
"Patient admitted to the ED yesterday for syncopal episode.  Workup was benign.  Diagnosed with vasovagal syncope as the patient had this event while going to the bathroom.    Patient denies any similar symptoms at this time.  He reports \"I feel so much better\".  Patient reports that he was drinking alcohol prior to the event.  He states that he has not drank alcohol for 1 to 2 years.  This likely was a contributing factor.  Advised the patient to avoid alcohol as he is also taking bupropion, verbalized understanding.    Educational handout provided on vasovagal syncope. Discussed what signs and symptoms warrant emergent medical care. Patient verbalized understanding.     "

## 2023-12-19 NOTE — PATIENT INSTRUCTIONS
[facility] [phone]     Please complete lab work 1 week prior to follow-up    [facility]  Walk-In, No appointment needed  [address] in same suite as Jeff  Mon-Fri 7am-1pm

## 2023-12-29 DIAGNOSIS — I10 ESSENTIAL HYPERTENSION: ICD-10-CM

## 2023-12-29 RX ORDER — VALSARTAN 80 MG/1
80 TABLET ORAL DAILY
Qty: 90 TABLET | Refills: 3 | Status: SHIPPED | OUTPATIENT
Start: 2023-12-29

## 2024-02-29 ENCOUNTER — APPOINTMENT (OUTPATIENT)
Age: 24
End: 2024-02-29
Payer: COMMERCIAL

## 2024-02-29 DIAGNOSIS — I10 ESSENTIAL HYPERTENSION: ICD-10-CM

## 2024-02-29 DIAGNOSIS — R79.89 ELEVATED PLATELET COUNT: ICD-10-CM

## 2024-02-29 DIAGNOSIS — R68.2 DRY MOUTH: ICD-10-CM

## 2024-02-29 DIAGNOSIS — R55 VASOVAGAL SYNCOPE: ICD-10-CM

## 2024-02-29 DIAGNOSIS — F41.1 GENERALIZED ANXIETY DISORDER: ICD-10-CM

## 2024-02-29 LAB
ALBUMIN SERPL BCP-MCNC: 4.3 G/DL (ref 3.5–5)
ALP SERPL-CCNC: 49 U/L (ref 34–104)
ALT SERPL W P-5'-P-CCNC: 22 U/L (ref 7–52)
ANION GAP SERPL CALCULATED.3IONS-SCNC: 5 MMOL/L
AST SERPL W P-5'-P-CCNC: 20 U/L (ref 13–39)
BASOPHILS # BLD AUTO: 0.08 THOUSANDS/ÂΜL (ref 0–0.1)
BASOPHILS NFR BLD AUTO: 1 % (ref 0–1)
BILIRUB SERPL-MCNC: 0.54 MG/DL (ref 0.2–1)
BUN SERPL-MCNC: 10 MG/DL (ref 5–25)
CALCIUM SERPL-MCNC: 9.2 MG/DL (ref 8.4–10.2)
CHLORIDE SERPL-SCNC: 104 MMOL/L (ref 96–108)
CO2 SERPL-SCNC: 26 MMOL/L (ref 21–32)
CREAT SERPL-MCNC: 0.96 MG/DL (ref 0.6–1.3)
EOSINOPHIL # BLD AUTO: 0.26 THOUSAND/ÂΜL (ref 0–0.61)
EOSINOPHIL NFR BLD AUTO: 3 % (ref 0–6)
ERYTHROCYTE [DISTWIDTH] IN BLOOD BY AUTOMATED COUNT: 12 % (ref 11.6–15.1)
GFR SERPL CREATININE-BSD FRML MDRD: 109 ML/MIN/1.73SQ M
GLUCOSE SERPL-MCNC: 112 MG/DL (ref 65–140)
HCT VFR BLD AUTO: 43.3 % (ref 36.5–49.3)
HGB BLD-MCNC: 13.9 G/DL (ref 12–17)
IMM GRANULOCYTES # BLD AUTO: 0.02 THOUSAND/UL (ref 0–0.2)
IMM GRANULOCYTES NFR BLD AUTO: 0 % (ref 0–2)
LYMPHOCYTES # BLD AUTO: 2.94 THOUSANDS/ÂΜL (ref 0.6–4.47)
LYMPHOCYTES NFR BLD AUTO: 36 % (ref 14–44)
MCH RBC QN AUTO: 29.1 PG (ref 26.8–34.3)
MCHC RBC AUTO-ENTMCNC: 32.1 G/DL (ref 31.4–37.4)
MCV RBC AUTO: 91 FL (ref 82–98)
MONOCYTES # BLD AUTO: 0.7 THOUSAND/ÂΜL (ref 0.17–1.22)
MONOCYTES NFR BLD AUTO: 9 % (ref 4–12)
NEUTROPHILS # BLD AUTO: 4.11 THOUSANDS/ÂΜL (ref 1.85–7.62)
NEUTS SEG NFR BLD AUTO: 51 % (ref 43–75)
NRBC BLD AUTO-RTO: 0 /100 WBCS
PLATELET # BLD AUTO: 323 THOUSANDS/UL (ref 149–390)
PMV BLD AUTO: 10.4 FL (ref 8.9–12.7)
POTASSIUM SERPL-SCNC: 4 MMOL/L (ref 3.5–5.3)
PROT SERPL-MCNC: 7.1 G/DL (ref 6.4–8.4)
RBC # BLD AUTO: 4.77 MILLION/UL (ref 3.88–5.62)
SODIUM SERPL-SCNC: 135 MMOL/L (ref 135–147)
WBC # BLD AUTO: 8.11 THOUSAND/UL (ref 4.31–10.16)

## 2024-02-29 PROCEDURE — 36415 COLL VENOUS BLD VENIPUNCTURE: CPT

## 2024-02-29 PROCEDURE — 80053 COMPREHEN METABOLIC PANEL: CPT

## 2024-02-29 PROCEDURE — 85025 COMPLETE CBC W/AUTO DIFF WBC: CPT

## 2024-03-07 ENCOUNTER — HOSPITAL ENCOUNTER (EMERGENCY)
Facility: HOSPITAL | Age: 24
Discharge: HOME | End: 2024-03-07
Attending: EMERGENCY MEDICINE
Payer: COMMERCIAL

## 2024-03-07 VITALS
WEIGHT: 187.17 LBS | TEMPERATURE: 98.8 F | SYSTOLIC BLOOD PRESSURE: 148 MMHG | OXYGEN SATURATION: 96 % | RESPIRATION RATE: 18 BRPM | DIASTOLIC BLOOD PRESSURE: 80 MMHG | HEART RATE: 86 BPM | BODY MASS INDEX: 27.72 KG/M2 | HEIGHT: 69 IN

## 2024-03-07 DIAGNOSIS — K08.89 DENTALGIA: Primary | ICD-10-CM

## 2024-03-07 PROCEDURE — 99283 EMERGENCY DEPT VISIT LOW MDM: CPT | Performed by: EMERGENCY MEDICINE

## 2024-03-07 PROCEDURE — 99282 EMERGENCY DEPT VISIT SF MDM: CPT

## 2024-03-07 NOTE — ED NOTES
Pt was evaluated and discharged by provider independent of nursing and prior to nursing assessment.      Anh Che RN  03/07/24 [id number]

## 2024-03-07 NOTE — ED PROVIDER NOTES
History  Chief Complaint   Patient presents with    Dental Pain     Pt reports full mouth dental pain for past month. Pt reports pain to teeth when food or water touched the teeth. Today, pt reports all dental pain has gone away. He reports no sensitivity to food/water today.     23 y.o. M p/w dentalgia x 1 month.  Sensitivity to cold and food and water.  All of his pain stopped today. His friend at work told him it could be something life threatening which prompted pt to come to the ER.  Hasn't seen a dentist in a while.  Denies F/C, facial/gum swelling.      History provided by:  Patient   used: No        Prior to Admission Medications   Prescriptions Last Dose Informant Patient Reported? Taking?   EPINEPHrine (EPIPEN) 0.3 mg/0.3 mL SOAJ   No No   Sig: Inject 0.3 mL (0.3 mg total) into a muscle once for 1 dose   albuterol (2.5 mg/3 mL) 0.083 % nebulizer solution   No No   Sig: Take 3 mL (2.5 mg total) by nebulization every 6 (six) hours as needed for wheezing or shortness of breath   albuterol (ACCUNEB) 1.25 MG/3ML nebulizer solution  Self Yes No   Sig: Inhale 1 ampule every 6 (six) hours as needed   albuterol (Ventolin HFA) 90 mcg/act inhaler   No No   Sig: Inhale 2 puffs every 6 (six) hours as needed for wheezing   buPROPion (WELLBUTRIN XL) 300 mg 24 hr tablet   No No   Sig: Take 1 tablet (300 mg total) by mouth every morning   ofloxacin (FLOXIN) 0.3 % otic solution   No No   Sig: Administer 10 drops into the left ear daily   Patient not taking: Reported on 11/5/2023   omeprazole (PriLOSEC) 40 MG capsule   No No   Sig: Take 1 capsule (40 mg total) by mouth every evening   triamcinolone (KENALOG) 0.1 % cream   No No   Sig: Apply topically 2 (two) times a day   Patient not taking: Reported on 11/14/2023   valsartan (DIOVAN) 80 mg tablet   No No   Sig: Take 1 tablet (80 mg total) by mouth daily      Facility-Administered Medications: None       Past Medical History:   Diagnosis Date    Allergic      Anxiety     Arthritis     Asthma     Asthma due to seasonal allergies     GERD (gastroesophageal reflux disease)     Headache(784.0)     HL (hearing loss)     Hypertension     Memory loss        Past Surgical History:   Procedure Laterality Date    COLONOSCOPY      NO PAST SURGERIES      UPPER GASTROINTESTINAL ENDOSCOPY      WISDOM TOOTH EXTRACTION         Family History   Problem Relation Age of Onset    Fibromyalgia Mother     Rheum arthritis Father      I have reviewed and agree with the history as documented.    E-Cigarette/Vaping    E-Cigarette Use Current Every Day User     Start Date 14     Cartridges/Day 1 cartridge every 2 weeks      E-Cigarette/Vaping Substances    Nicotine Yes     THC No     CBD No     Flavoring Yes     Other No     Unknown No      Social History     Tobacco Use    Smoking status: Former     Current packs/day: 0.00     Types: Cigarettes     Quit date: 2019     Years since quittin.2    Smokeless tobacco: Never    Tobacco comments:     vapes   Vaping Use    Vaping status: Every Day    Start date: 2014    Substances: Nicotine, Flavoring   Substance Use Topics    Alcohol use: Not Currently     Comment: every few months    Drug use: No       Review of Systems   Constitutional:  Negative for chills and fever.   HENT:  Positive for dental problem. Negative for drooling, facial swelling and trouble swallowing.    Musculoskeletal:  Negative for neck pain and neck stiffness.       Physical Exam  Physical Exam  Vitals and nursing note reviewed.   Constitutional:       General: He is not in acute distress.     Appearance: He is well-developed. He is not ill-appearing, toxic-appearing or diaphoretic.   HENT:      Head: Normocephalic.      Jaw: There is normal jaw occlusion. No trismus, swelling, pain on movement or malocclusion.      Mouth/Throat:      Mouth: Mucous membranes are moist. Mucous membranes are not pale and not dry. No angioedema.      Dentition: Normal dentition. No  dental tenderness or dental abscesses.      Tongue: Tongue does not deviate from midline.      Pharynx: Oropharynx is clear. Uvula midline. No pharyngeal swelling, oropharyngeal exudate, posterior oropharyngeal erythema or uvula swelling.      Tonsils: No tonsillar exudate or tonsillar abscesses.   Neck:      Trachea: Phonation normal.      Comments: No erythema overlying neck.  No masses or swelling.    Pulmonary:      Effort: Pulmonary effort is normal. No accessory muscle usage, respiratory distress or retractions.      Breath sounds: Normal breath sounds and air entry. No stridor.   Musculoskeletal:      Cervical back: Normal range of motion and neck supple. No edema, erythema, rigidity or crepitus.   Lymphadenopathy:      Head:      Right side of head: No submental or submandibular adenopathy.      Left side of head: No submental or submandibular adenopathy.      Cervical: No cervical adenopathy.   Skin:     General: Skin is warm and dry.      Coloration: Skin is not cyanotic or mottled.      Findings: No erythema or rash.   Neurological:      Mental Status: He is alert. He is not disoriented.      GCS: GCS eye subscore is 4. GCS verbal subscore is 5. GCS motor subscore is 6.      Comments: Cranial nerves grossly intact         Vital Signs  ED Triage Vitals   Temperature Pulse Respirations Blood Pressure SpO2   03/07/24 1013 03/07/24 1012 03/07/24 1012 03/07/24 1012 03/07/24 1012   98.8 °F (37.1 °C) 86 18 148/80 96 %      Temp Source Heart Rate Source Patient Position - Orthostatic VS BP Location FiO2 (%)   03/07/24 1013 03/07/24 1012 03/07/24 1012 03/07/24 1012 --   Oral Monitor Sitting Left arm       Pain Score       --                  Vitals:    03/07/24 1012   BP: 148/80   Pulse: 86   Patient Position - Orthostatic VS: Sitting         Visual Acuity      ED Medications  Medications - No data to display    Diagnostic Studies  Results Reviewed       None                   No orders to display               Procedures  Procedures         ED Course                               SBIRT 20yo+      Flowsheet Row Most Recent Value   Initial Alcohol Screen: US AUDIT-C     1. How often do you have a drink containing alcohol? 1 Filed at: 03/07/2024 1013   2. How many drinks containing alcohol do you have on a typical day you are drinking?  0 Filed at: 03/07/2024 1013   3a. Male UNDER 65: How often do you have five or more drinks on one occasion? 0 Filed at: 03/07/2024 1013   Audit-C Score 1 Filed at: 03/07/2024 1013   ASTRID: How many times in the past year have you...    Used an illegal drug or used a prescription medication for non-medical reasons? Never Filed at: 03/07/2024 1013                      Medical Decision Making  No pain currently.  No signs of infection/abscess. Will give dental f/u.             Disposition  Final diagnoses:   Dentalgia     Time reflects when diagnosis was documented in both MDM as applicable and the Disposition within this note       Time User Action Codes Description Comment    3/7/2024 10:21 AM Karey Lewis Add [K08.89] Dentalgia           ED Disposition       ED Disposition   Discharge    Condition   Stable    Date/Time   Thu Mar 7, 2024 1021    Comment   Darnell Gottlieb discharge to home/self care.                   Follow-up Information       Follow up With Specialties Details Why Contact [facility]  Schedule an appointment as soon as possible for a visit   [address] [facility] [address]  [phone]    [facility]  Schedule an appointment as soon as possible for a visit   [address] [place] [address] [address]  [facility] [address]  [phone]            Discharge Medication List as of 3/7/2024 10:24 AM        CONTINUE these medications which have NOT CHANGED    Details   albuterol (2.5 mg/3 mL) 0.083 % nebulizer solution Take 3 mL (2.5 mg total) by nebulization every 6 (six) hours as needed for wheezing or shortness of  breath, Starting Mon 12/12/2022, Normal      albuterol (ACCUNEB) 1.25 MG/3ML nebulizer solution Inhale 1 ampule every 6 (six) hours as needed, Historical Med      albuterol (Ventolin HFA) 90 mcg/act inhaler Inhale 2 puffs every 6 (six) hours as needed for wheezing, Starting Wed 11/15/2023, Normal      buPROPion (WELLBUTRIN XL) 300 mg 24 hr tablet Take 1 tablet (300 mg total) by mouth every morning, Starting Wed 11/15/2023, Normal      EPINEPHrine (EPIPEN) 0.3 mg/0.3 mL SOAJ Inject 0.3 mL (0.3 mg total) into a muscle once for 1 dose, Starting Tue 12/19/2023, Normal      ofloxacin (FLOXIN) 0.3 % otic solution Administer 10 drops into the left ear daily, Starting Thu 9/14/2023, Normal      omeprazole (PriLOSEC) 40 MG capsule Take 1 capsule (40 mg total) by mouth every evening, Starting Wed 10/25/2023, Normal      triamcinolone (KENALOG) 0.1 % cream Apply topically 2 (two) times a day, Starting Thu 9/14/2023, Normal      valsartan (DIOVAN) 80 mg tablet Take 1 tablet (80 mg total) by mouth daily, Starting Fri 12/29/2023, Normal             No discharge procedures on file.    PDMP Review       None            ED Provider  Electronically Signed by             Karey Lewis DO  03/07/24 1100

## 2024-04-09 ENCOUNTER — TELEPHONE (OUTPATIENT)
Age: 24
End: 2024-04-09

## 2024-04-09 NOTE — TELEPHONE ENCOUNTER
Called to inform patient that Insurance has PCP listed as another provider. Will need to change prior to appointment. Unable to Leave message , voice mail not set up yet.

## 2024-04-11 ENCOUNTER — OFFICE VISIT (OUTPATIENT)
Age: 24
End: 2024-04-11

## 2024-04-11 VITALS
WEIGHT: 182.98 LBS | HEIGHT: 69 IN | BODY MASS INDEX: 27.1 KG/M2 | SYSTOLIC BLOOD PRESSURE: 130 MMHG | DIASTOLIC BLOOD PRESSURE: 86 MMHG | OXYGEN SATURATION: 99 % | RESPIRATION RATE: 18 BRPM | HEART RATE: 78 BPM

## 2024-04-11 DIAGNOSIS — R79.89 ELEVATED PLATELET COUNT: ICD-10-CM

## 2024-04-11 DIAGNOSIS — F41.1 GENERALIZED ANXIETY DISORDER: Primary | ICD-10-CM

## 2024-04-11 DIAGNOSIS — I10 ESSENTIAL HYPERTENSION: ICD-10-CM

## 2024-04-11 RX ORDER — ESCITALOPRAM OXALATE 5 MG/1
5 TABLET ORAL DAILY
Qty: 90 TABLET | Refills: 3 | Status: SHIPPED | OUTPATIENT
Start: 2024-04-18

## 2024-04-11 RX ORDER — BUPROPION HYDROCHLORIDE 150 MG/1
TABLET ORAL EVERY MORNING
Qty: 11 TABLET | Refills: 0 | Status: SHIPPED | OUTPATIENT
Start: 2024-04-11 | End: 2024-04-25

## 2024-04-11 NOTE — ASSESSMENT & PLAN NOTE
- Report chronic LE swelling  - CTM, can consider lasix if needed     BP at goal  Continue Valsartan 80mg QD

## 2024-04-11 NOTE — PATIENT INSTRUCTIONS
Start low dose Wellbutrin (150mg) today x 1 week.   After 1 week, decrease the Wellbutrin (150mg tab) to every other and start the Lexapro (1 tab daily).

## 2024-04-11 NOTE — ASSESSMENT & PLAN NOTE
Patient reports that his Wellbutrin is no longer helping with his anxiety.  Patient reports feeling frequently anxious, mind racing, and trouble with focus.  Patient would like to use transition from Wellbutrin to an SSRI.  Per shared decision making, will taper off of Wellbutrin and start Lexapro.  Side effects discussed, specifically the risk for weight gain and sexual dysfunction, patient verbalized understanding.    Will decrease Wellbutrin to 150 mg daily for 1 week.  After 1 week, the patient will decrease the Wellbutrin to 150 mg every other day while at the same time starting Lexapro at 5 mg daily.  After the 2-week period, the patient is to continue daily Lexapro and will be completely tapered off of his Wellbutrin.  Will have the patient return in 6 weeks to assess progress.  Plan is to increase Lexapro to 10 mg daily.    Discussed what signs and symptoms warrant follow-up vs emergent medical care. Patient/family verbalized understanding.       Documentation for this encounter was completed with the assistance of dictation software.  Please excuse any grammatical errors.  Please contact the provider if any documentation clarification is necessary.

## 2024-04-11 NOTE — PROGRESS NOTES
Name: Darnell Gottlieb      : 2000      MRN: [id number]  Encounter Provider: JANAK Farnsworth  Encounter Date: 2024   Encounter department: [facility] PRIMARY CARE    Assessment & Plan     1. Generalized anxiety disorder  Assessment & Plan:  Patient reports that his Wellbutrin is no longer helping with his anxiety.  Patient reports feeling frequently anxious, mind racing, and trouble with focus.  Patient would like to use transition from Wellbutrin to an SSRI.  Per shared decision making, will taper off of Wellbutrin and start Lexapro.  Side effects discussed, specifically the risk for weight gain and sexual dysfunction, patient verbalized understanding.    Will decrease Wellbutrin to 150 mg daily for 1 week.  After 1 week, the patient will decrease the Wellbutrin to 150 mg every other day while at the same time starting Lexapro at 5 mg daily.  After the 2-week period, the patient is to continue daily Lexapro and will be completely tapered off of his Wellbutrin.  Will have the patient return in 6 weeks to assess progress.  Plan is to increase Lexapro to 10 mg daily.    Discussed what signs and symptoms warrant follow-up vs emergent medical care. Patient/family verbalized understanding.       Documentation for this encounter was completed with the assistance of dictation software.  Please excuse any grammatical errors.  Please contact the provider if any documentation clarification is necessary.      Orders:  -     escitalopram (LEXAPRO) 5 mg tablet; Take 1 tablet (5 mg total) by mouth daily Do not start before 2024.  -     buPROPion (WELLBUTRIN XL) 150 mg 24 hr tablet; Take 1 tablet (150 mg total) by mouth every morning for 7 days, THEN 1 tablet (150 mg total) every other day for 7 days.    2. Essential hypertension  Assessment & Plan:  BP at goal  Continue Valsartan 80mg QD      3. Elevated platelet count  Assessment & Plan:  Resolved per recent CBC, was likely  reactive             Subjective      Patient presents today to discuss lab work and to discuss his anxiety.  The patient reports that Wellbutrin is no longer helping with his anxiety symptoms.  He states that recently he has been having troubles with anxiousness, focus, and mind racing sensations.  Patient is interested in transitioning off of Wellbutrin and onto an SSRI.      Review of Systems   Constitutional: Negative.    HENT: Negative.     Eyes: Negative.    Respiratory: Negative.     Cardiovascular: Negative.    Gastrointestinal: Negative.    Endocrine: Negative.    Genitourinary: Negative.    Musculoskeletal: Negative.    Skin: Negative.    Allergic/Immunologic: Negative.    Neurological: Negative.    Hematological: Negative.    Psychiatric/Behavioral:  Positive for decreased concentration. Negative for self-injury, sleep disturbance and suicidal ideas. The patient is nervous/anxious.        Current Outpatient Medications on File Prior to Visit   Medication Sig   • albuterol (2.5 mg/3 mL) 0.083 % nebulizer solution Take 3 mL (2.5 mg total) by nebulization every 6 (six) hours as needed for wheezing or shortness of breath   • albuterol (Ventolin HFA) 90 mcg/act inhaler Inhale 2 puffs every 6 (six) hours as needed for wheezing   • EPINEPHrine (EPIPEN) 0.3 mg/0.3 mL SOAJ Inject 0.3 mL (0.3 mg total) into a muscle once for 1 dose   • omeprazole (PriLOSEC) 40 MG capsule Take 1 capsule (40 mg total) by mouth every evening   • valsartan (DIOVAN) 80 mg tablet Take 1 tablet (80 mg total) by mouth daily   • [DISCONTINUED] buPROPion (WELLBUTRIN XL) 300 mg 24 hr tablet Take 1 tablet (300 mg total) by mouth every morning   • albuterol (ACCUNEB) 1.25 MG/3ML nebulizer solution Inhale 1 ampule every 6 (six) hours as needed   • ofloxacin (FLOXIN) 0.3 % otic solution Administer 10 drops into the left ear daily (Patient not taking: Reported on 11/5/2023)   • triamcinolone (KENALOG) 0.1 % cream Apply topically 2 (two) times a day  "(Patient not taking: Reported on 11/14/2023)       Objective     /86 (BP Location: Left arm, Patient Position: Sitting, Cuff Size: Large)   Pulse 78   Resp 18   Ht 5' 9\" (1.753 m)   Wt 83 kg (182 lb 15.7 oz)   SpO2 99%   BMI 27.02 kg/m²     Physical Exam  Constitutional:       General: He is not in acute distress.     Appearance: Normal appearance.   Eyes:      Extraocular Movements: Extraocular movements intact.      Conjunctiva/sclera: Conjunctivae normal.   Pulmonary:      Effort: Pulmonary effort is normal. No respiratory distress.      Breath sounds: No wheezing.   Abdominal:      General: Abdomen is flat.   Musculoskeletal:         General: Normal range of motion.      Cervical back: Normal range of motion.   Skin:     Findings: No erythema or rash.   Neurological:      Mental Status: He is alert and oriented to person, place, and time.   Psychiatric:         Attention and Perception: He is inattentive.         Mood and Affect: Affect is flat.         Behavior: Behavior normal.       JANAK Farnsworth    "

## 2024-05-13 ENCOUNTER — OFFICE VISIT (OUTPATIENT)
Age: 24
End: 2024-05-13
Payer: COMMERCIAL

## 2024-05-13 ENCOUNTER — APPOINTMENT (OUTPATIENT)
Age: 24
End: 2024-05-13
Payer: COMMERCIAL

## 2024-05-13 VITALS
SYSTOLIC BLOOD PRESSURE: 110 MMHG | BODY MASS INDEX: 28.87 KG/M2 | TEMPERATURE: 97 F | WEIGHT: 194.89 LBS | DIASTOLIC BLOOD PRESSURE: 80 MMHG | HEIGHT: 69 IN | HEART RATE: 78 BPM | OXYGEN SATURATION: 98 %

## 2024-05-13 DIAGNOSIS — M25.551 CHRONIC RIGHT HIP PAIN: ICD-10-CM

## 2024-05-13 DIAGNOSIS — M25.551 CHRONIC RIGHT HIP PAIN: Primary | ICD-10-CM

## 2024-05-13 DIAGNOSIS — G89.29 CHRONIC RIGHT HIP PAIN: ICD-10-CM

## 2024-05-13 DIAGNOSIS — G89.29 CHRONIC RIGHT HIP PAIN: Primary | ICD-10-CM

## 2024-05-13 PROCEDURE — 99213 OFFICE O/P EST LOW 20 MIN: CPT | Performed by: NURSE PRACTITIONER

## 2024-05-13 PROCEDURE — 73502 X-RAY EXAM HIP UNI 2-3 VIEWS: CPT

## 2024-05-13 RX ORDER — DICLOFENAC SODIUM 25 MG/1
25 TABLET, DELAYED RELEASE ORAL 2 TIMES DAILY
Qty: 20 TABLET | Refills: 0 | Status: SHIPPED | OUTPATIENT
Start: 2024-05-13 | End: 2024-05-22

## 2024-05-13 NOTE — PROGRESS NOTES
"Name: Darnell Gottlieb      : 2000      MRN: [id number]  Encounter Provider: JANAK Farnsworth  Encounter Date: 2024   Encounter department: [facility] PRIMARY CARE    Assessment & Plan     Chronic Right Hip Pain:    Differentials include bursitis vs tendinopathy vs femoroacetabular impingement vs labral tear  Due to chronicity, will xray the impacted right hip  Start diclofenac 25mg BID x 10 days for pain/inflammation. Take with food to prevent GI upset while on SSRI therapy.  Ortho referral placed for further evaluation if needed pending hip pain improvement  Likely would benefit from PT pending xray results     Discussed what signs and symptoms warrant follow-up vs emergent medical care. Patient/family verbalized understanding.     Documentation for this encounter was completed with the assistance of dictation software.  Please excuse any grammatical errors.  Please contact the provider if any documentation clarification is necessary.      1. Chronic right hip pain  -     XR hip/pelv 2-3 vws right if performed; Future; Expected date: 2024  -     diclofenac sodium (VOLTAREN) 25 MG EC tablet; Take 1 tablet (25 mg total) by mouth 2 (two) times a day for 10 days  -     Ambulatory Referral to Orthopedic Surgery; Future         Subjective      Hip Pain  Patient complains of right hip pain. Onset of the symptoms was approx 6 months ago. Inciting event: none. The patient reports the hip pain is worse with \"occurs randomly\" and is better with \"nothing\" per patient. Associated symptoms:  crepitus sensation. Aggravating symptoms include: \"most activities\". Patient has had no prior hip problems. Previous visits for this problem: none. Evaluation to date: none. Treatment to date: none.    Pain \"shooting quality\", 10/10 at worse  Lateral hip impacted      Review of Systems   Constitutional: Negative.  Negative for chills and fever.   HENT: Negative.     Eyes: Negative.    Respiratory: " "Negative.     Cardiovascular: Negative.    Gastrointestinal: Negative.  Negative for abdominal pain, diarrhea and vomiting.   Endocrine: Negative.    Genitourinary: Negative.    Musculoskeletal:  Positive for arthralgias.   Skin: Negative.    Allergic/Immunologic: Negative.    Neurological: Negative.    Hematological: Negative.    Psychiatric/Behavioral: Negative.         Current Outpatient Medications on File Prior to Visit   Medication Sig   • albuterol (2.5 mg/3 mL) 0.083 % nebulizer solution Take 3 mL (2.5 mg total) by nebulization every 6 (six) hours as needed for wheezing or shortness of breath   • albuterol (ACCUNEB) 1.25 MG/3ML nebulizer solution Inhale 1 ampule every 6 (six) hours as needed   • albuterol (Ventolin HFA) 90 mcg/act inhaler Inhale 2 puffs every 6 (six) hours as needed for wheezing   • EPINEPHrine (EPIPEN) 0.3 mg/0.3 mL SOAJ Inject 0.3 mL (0.3 mg total) into a muscle once for 1 dose   • escitalopram (LEXAPRO) 5 mg tablet Take 1 tablet (5 mg total) by mouth daily Do not start before April 18, 2024.   • omeprazole (PriLOSEC) 40 MG capsule Take 1 capsule (40 mg total) by mouth every evening   • valsartan (DIOVAN) 80 mg tablet Take 1 tablet (80 mg total) by mouth daily   • buPROPion (WELLBUTRIN XL) 150 mg 24 hr tablet Take 1 tablet (150 mg total) by mouth every morning for 7 days, THEN 1 tablet (150 mg total) every other day for 7 days.   • ofloxacin (FLOXIN) 0.3 % otic solution Administer 10 drops into the left ear daily   • triamcinolone (KENALOG) 0.1 % cream Apply topically 2 (two) times a day       Objective     /80 (BP Location: Left arm, Patient Position: Sitting, Cuff Size: Large)   Pulse 78   Temp (!) 97 °F (36.1 °C) (Temporal)   Ht 5' 9\" (1.753 m)   Wt 88.4 kg (194 lb 14.2 oz)   SpO2 98%   BMI 28.78 kg/m²     Physical Exam  Constitutional:       General: He is not in acute distress.     Appearance: Normal appearance.   Eyes:      Extraocular Movements: Extraocular movements " intact.      Conjunctiva/sclera: Conjunctivae normal.   Pulmonary:      Effort: Pulmonary effort is normal. No respiratory distress.      Breath sounds: No wheezing.   Abdominal:      General: Abdomen is flat.   Musculoskeletal:      Cervical back: Normal range of motion.      Lumbar back: No tenderness or bony tenderness. Negative right straight leg raise test and negative left straight leg raise test.      Right hip: No deformity, tenderness, bony tenderness or crepitus. Decreased range of motion. Normal strength.      Left hip: No deformity, tenderness, bony tenderness or crepitus. Normal range of motion. Normal strength.        Legs:       Comments: + FADDIR test, right hip  - JANE test, right hip   Skin:     Findings: No erythema or rash.   Neurological:      Mental Status: He is alert and oriented to person, place, and time.   Psychiatric:         Mood and Affect: Mood normal.         Behavior: Behavior normal.       JANAK Farnsworth

## 2024-05-22 ENCOUNTER — OFFICE VISIT (OUTPATIENT)
Age: 24
End: 2024-05-22
Payer: COMMERCIAL

## 2024-05-22 VITALS
WEIGHT: 192.68 LBS | DIASTOLIC BLOOD PRESSURE: 80 MMHG | HEIGHT: 69 IN | HEART RATE: 100 BPM | OXYGEN SATURATION: 97 % | TEMPERATURE: 98.1 F | SYSTOLIC BLOOD PRESSURE: 120 MMHG | BODY MASS INDEX: 28.54 KG/M2

## 2024-05-22 DIAGNOSIS — I10 ESSENTIAL HYPERTENSION: ICD-10-CM

## 2024-05-22 DIAGNOSIS — F41.1 GENERALIZED ANXIETY DISORDER: Primary | ICD-10-CM

## 2024-05-22 PROCEDURE — 99213 OFFICE O/P EST LOW 20 MIN: CPT | Performed by: NURSE PRACTITIONER

## 2024-05-22 RX ORDER — ESCITALOPRAM OXALATE 10 MG/1
10 TABLET ORAL DAILY
Qty: 90 TABLET | Refills: 3 | Status: SHIPPED | OUTPATIENT
Start: 2024-05-22 | End: 2025-05-17

## 2024-05-22 NOTE — PROGRESS NOTES
Ambulatory Visit  Name: Darnell Gottlieb      : 2000      MRN: [id number]  Encounter Provider: JANAK Farnsworth  Encounter Date: 2024   Encounter department: [facility] PRIMARY CARE    Assessment & Plan   1. Generalized anxiety disorder  Assessment & Plan:  Patient is tolerating escitalopram without side effects.  However, he continues to report difficulties with concentration and racing thoughts.  Per shared decision making, will increase escitalopram to 10 mg daily.  4-week follow-up to reassess  Orders:  -     escitalopram (LEXAPRO) 10 mg tablet; Take 1 tablet (10 mg total) by mouth daily  2. Essential hypertension  Assessment & Plan:  Blood pressure at goal, continue valsartan as prescribed       History of Present Illness   {Disappearing Hyperlinks I Encounters * My Last Note * Since Last Visit * History :22506}  Subjective  Darnell Gottlieb is a 23 y.o. male who presents for follow up of anxiety disorder. Current symptoms: difficulty concentrating, racing thoughts. He denies current suicidal and homicidal ideation. He complains of the following side effects from the treatment: none.    Patient declined completing CODIE-7.       Review of Systems   Constitutional: Negative.    HENT: Negative.     Eyes: Negative.    Respiratory: Negative.     Cardiovascular: Negative.    Gastrointestinal: Negative.    Endocrine: Negative.    Genitourinary: Negative.    Musculoskeletal: Negative.    Skin: Negative.    Allergic/Immunologic: Negative.    Neurological: Negative.    Hematological: Negative.    Psychiatric/Behavioral:  Positive for decreased concentration. Negative for self-injury and suicidal ideas. The patient is nervous/anxious.        Objective   {Disappearing Hyperlinks   Review Vitals * Enter New Vitals * Results Review * Labs * Imaging * Cardiology * Procedures * Lung Cancer Screening :70564}  /80 (BP Location: Left arm, Patient Position: Sitting, Cuff Size: Large)   Pulse 100    "Temp 98.1 °F (36.7 °C) (Temporal)   Ht 5' 9\" (1.753 m)   Wt 87.4 kg (192 lb 10.9 oz)   SpO2 97%   BMI 28.45 kg/m²     Physical Exam  Constitutional:       General: He is not in acute distress.     Appearance: Normal appearance.   HENT:      Nose: Nose normal.   Eyes:      Extraocular Movements: Extraocular movements intact.      Conjunctiva/sclera: Conjunctivae normal.   Cardiovascular:      Rate and Rhythm: Normal rate and regular rhythm.      Heart sounds: Normal heart sounds.   Pulmonary:      Effort: Pulmonary effort is normal. No respiratory distress.      Breath sounds: No wheezing.   Abdominal:      General: Abdomen is flat.   Musculoskeletal:         General: Normal range of motion.      Cervical back: Normal range of motion.   Skin:     Findings: No erythema or rash.   Neurological:      Mental Status: He is alert and oriented to person, place, and time.   Psychiatric:         Mood and Affect: Affect is flat.         Speech: Speech is delayed.         Behavior: Behavior normal.         Thought Content: Thought content normal.         Cognition and Memory: Cognition normal.         Judgment: Judgment normal.       Administrative Statements {Disappearing Hyperlinks I  Level of Service * [facility]}    "

## 2024-05-22 NOTE — PATIENT INSTRUCTIONS
Anxiety   AMBULATORY CARE:   Anxiety  is a condition that causes you to feel extremely worried or nervous. The feelings are so strong that they can cause problems with your daily activities or sleep. Anxiety may be triggered by something you fear, or it may happen without a cause. Family or work stress, smoking, caffeine, and alcohol can increase your risk for anxiety. Certain medicines or health conditions can also increase your risk. Anxiety can become a long-term condition if it is not managed or treated.  Common signs and symptoms:   Fatigue or muscle tightness    Shaking, restlessness, or irritability    Problems focusing    Trouble sleeping    Feeling jumpy, easily startled, or dizzy    Rapid heartbeat or shortness of breath    Call your local emergency number (911 in the [place]) if:   You have chest pain, tightness, or heaviness that may spread to your shoulders, arms, jaw, neck, or back.    You think about harming yourself or someone else.    Call your therapist or doctor if:   Your symptoms get worse or do not get better with treatment.    Your anxiety keeps you from doing your regular daily activities.    You have new symptoms since your last visit.    You have questions or concerns about your condition or care.    The following resources are available at any time to help you, if needed:   Contact a suicide prevention organization:        For the [employer] Suicide and Crisis Lifeline:     Call or text [employer]     Send a chat on https://[employer].org/chat     Call 3-361-207-5493 (1-800-273-TALK)    For the Suicide Hotline, call 8-036-360-5480 (3-482-IBVGJDI)    For a list of international numbers: https://save.org/find-help/international-resources/  Treatment for anxiety  depends on how severe your symptoms are. The following are common treatments for anxiety:  Cognitive behavioral therapy (CBT)  teaches you how to identify and change negative thought patterns.    Anxiety or antidepressant medicine  may help relieve or  prevent anxiety. You may need to take the medicine for several weeks before you begin to feel better. Tell your healthcare provider about any side effects or problems you have with your medicine. The type or amount of medicine may need to be changed. Medicines are usually used along with therapy.    Self-care:   Talk to someone about your anxiety.  Your healthcare provider may suggest counseling. You might feel more comfortable talking with a friend or family member about your anxiety. Choose someone you know will be supportive and encouraging.    Get regular physical activity.  Physical activity can lower your stress, improve your mood, and help you sleep better. Work with your healthcare provider to develop a plan that you enjoy.         Create a regular sleep schedule.  A routine can help you relax before bed. Listen to music, read, or do yoga. Try to go to bed and wake up at the same time every day. Sleep is important for emotional health.    Find ways to relax.  Activities such as meditation or listening to music can help you relax. Spend time with friends, or do things you enjoy.    Do activities you enjoy.  Spend time with friends, or do something fun. Choose activities you are familiar with or comfortable doing. This may help prevent anxiety.    Practice deep breathing.  Deep breathing can help you relax when you feel anxious. Focus on taking slow, deep breaths several times a day, or during an anxiety attack. Breathe in through your nose and out through your mouth. Deep breathing combined with meditation or listening to music may help you feel calmer.    Do not have caffeine.  Caffeine can make your symptoms worse. Do not have foods or drinks that are meant to increase your energy level.    Do not drink alcohol or use drugs.  Alcohol and drugs can worsen anxiety or make it hard to manage. Talk to your therapist or healthcare provider if you need help to quit.       Follow up with your therapist or doctor as  directed:  Your healthcare provider will monitor your progress at follow-up visits. Your provider will also monitor your medicine if you take antidepressants and ask if the medicine is helping. Tell your provider about any side effects or problems you have with your medicine. The type or amount of medicine may need to be changed. Write down your questions so you remember to ask them during your visits.  For more information or support:   [employer]  [address]  [place] [address]  Phone: 1- 557 - 187-5425  Phone: 0- 331 - 427-2182  Web Address: http://www.[employer]  [address] Suicide and Crisis Lifeline  [address]  [place] [address]  Phone: 5- 475 - 946  Web Address: http://www.suicidepreventionlifeline.org [place] https://[employer]/chat/    © Copyright [employer] 2023 Information is for End User's use only and may not be sold, redistributed or otherwise used for commercial purposes.  The above information is an  only. It is not intended as medical advice for individual conditions or treatments. Talk to your doctor, nurse or pharmacist before following any medical regimen to see if it is safe and effective for you.

## 2024-05-22 NOTE — ASSESSMENT & PLAN NOTE
Patient is tolerating escitalopram without side effects.  However, he continues to report difficulties with concentration and racing thoughts.  Per shared decision making, will increase escitalopram to 10 mg daily.  4-week follow-up to reassess

## 2024-06-20 ENCOUNTER — OFFICE VISIT (OUTPATIENT)
Age: 24
End: 2024-06-20
Payer: COMMERCIAL

## 2024-06-20 VITALS
WEIGHT: 196 LBS | SYSTOLIC BLOOD PRESSURE: 138 MMHG | BODY MASS INDEX: 29.03 KG/M2 | HEART RATE: 90 BPM | DIASTOLIC BLOOD PRESSURE: 88 MMHG | HEIGHT: 69 IN

## 2024-06-20 VITALS
HEIGHT: 69 IN | OXYGEN SATURATION: 96 % | WEIGHT: 196.21 LBS | BODY MASS INDEX: 29.06 KG/M2 | SYSTOLIC BLOOD PRESSURE: 138 MMHG | DIASTOLIC BLOOD PRESSURE: 88 MMHG | HEART RATE: 90 BPM

## 2024-06-20 DIAGNOSIS — G89.29 CHRONIC RIGHT HIP PAIN: ICD-10-CM

## 2024-06-20 DIAGNOSIS — M25.371 RIGHT ANKLE GIVES WAY: ICD-10-CM

## 2024-06-20 DIAGNOSIS — N52.2 DRUG-INDUCED ERECTILE DYSFUNCTION: ICD-10-CM

## 2024-06-20 DIAGNOSIS — F98.8 ATTENTION DEFICIT DISORDER, UNSPECIFIED HYPERACTIVITY PRESENCE: ICD-10-CM

## 2024-06-20 DIAGNOSIS — M25.551 GREATER TROCHANTERIC PAIN SYNDROME OF RIGHT LOWER EXTREMITY: Primary | ICD-10-CM

## 2024-06-20 DIAGNOSIS — M25.551 CHRONIC RIGHT HIP PAIN: ICD-10-CM

## 2024-06-20 DIAGNOSIS — F41.1 GENERALIZED ANXIETY DISORDER: Primary | ICD-10-CM

## 2024-06-20 DIAGNOSIS — J00 COMMON COLD: ICD-10-CM

## 2024-06-20 DIAGNOSIS — I10 ESSENTIAL HYPERTENSION: ICD-10-CM

## 2024-06-20 PROCEDURE — 99214 OFFICE O/P EST MOD 30 MIN: CPT | Performed by: NURSE PRACTITIONER

## 2024-06-20 PROCEDURE — 99243 OFF/OP CNSLTJ NEW/EST LOW 30: CPT | Performed by: STUDENT IN AN ORGANIZED HEALTH CARE EDUCATION/TRAINING PROGRAM

## 2024-06-20 RX ORDER — DEXTROAMPHETAMINE SACCHARATE, AMPHETAMINE ASPARTATE MONOHYDRATE, DEXTROAMPHETAMINE SULFATE AND AMPHETAMINE SULFATE 1.25; 1.25; 1.25; 1.25 MG/1; MG/1; MG/1; MG/1
CAPSULE, EXTENDED RELEASE ORAL EVERY MORNING
Refills: 0 | Status: CANCELLED | OUTPATIENT
Start: 2024-06-20

## 2024-06-20 RX ORDER — SILDENAFIL 50 MG/1
50 TABLET, FILM COATED ORAL DAILY PRN
Qty: 10 TABLET | Refills: 0 | Status: SHIPPED | OUTPATIENT
Start: 2024-06-20

## 2024-06-20 RX ORDER — DEXTROAMPHETAMINE SACCHARATE, AMPHETAMINE ASPARTATE MONOHYDRATE, DEXTROAMPHETAMINE SULFATE AND AMPHETAMINE SULFATE 2.5; 2.5; 2.5; 2.5 MG/1; MG/1; MG/1; MG/1
10 CAPSULE, EXTENDED RELEASE ORAL EVERY MORNING
Qty: 30 CAPSULE | Refills: 0 | Status: SHIPPED | OUTPATIENT
Start: 2024-06-20

## 2024-06-20 RX ORDER — ZINC GLUCONATE 50 MG
50 TABLET ORAL DAILY PRN
Qty: 90 TABLET | Refills: 0 | Status: SHIPPED | OUTPATIENT
Start: 2024-06-20

## 2024-06-20 RX ORDER — ESCITALOPRAM OXALATE 5 MG/1
5 TABLET ORAL DAILY
Qty: 90 TABLET | Refills: 1 | Status: SHIPPED | OUTPATIENT
Start: 2024-06-20

## 2024-06-20 NOTE — PROGRESS NOTES
"1. Greater trochanteric pain syndrome of right lower extremity  Ambulatory Referral to Physical Therapy      2. Chronic right hip pain  Ambulatory Referral to Orthopedic Surgery    Ambulatory Referral to Physical Therapy      3. Right ankle gives way  Ambulatory Referral to Physical Therapy    chronic issue; no pain        Orders Placed This Encounter   Procedures    Ambulatory Referral to Physical Therapy        Imaging Studies (I personally reviewed images in PACS and report):    X-ray right hip 5/13/2024: No acute osseous abnormalities.  No significant degenerative changes.  Unremarkable soft tissues.    IMPRESSION:  Chronic intermittent right lateral hip pain  Ongoing for months, atraumatic  \"Randomly\" occurs while walking   Radiographs unremarkable  Suspected greater trochanteric pain syndrome - pain elicited with ER testing of hip  Additionally notes history of recurrent right ankle inversion instability - history of prior right ankle sprains in the past and unsure if related to hip - slight laxity on talar tilt to suggest possible chronic CFL injury but no other abnormalities/pain on ankle/foot exam noted    PLAN:    Clinical exam and radiographic imaging reviewed with patient today, with impression as per above. I have discussed with the patient the pathophysiology of this diagnosis and reviewed how the examination correlates with this diagnosis.    Imaging obtained/reviewed as per above.  Recommended conservative treatment at this time and starting formal physical therapy to help facilitate recovery of the chronicity of this issue.  Referral placed.  I also supplemented him with a home exercise program.  In regards to pain control I counseled as needed use of acetaminophen, NSAIDs, heat/ice therapy, resting his hip as needed.    Return in about 6 weeks (around 8/1/2024).    Portions of the record may have been created with voice recognition software. Occasional wrong word or \"sound a like\" substitutions may " "have occurred due to the inherent limitations of voice recognition software. Read the chart carefully and recognize, using context, where substitutions have occurred.     CHIEF COMPLAINT:  Chief Complaint   Patient presents with    Right Hip - Pain     Pt states that he is having sharp pain and locking sensation on the lateral side of his hip. Pt states pain has been present for a few months and states no trauma. Pt states pain is intermittent and random in origin.          HPI:  Darnell Gottlieb is a 23 y.o. male  who presents in regards to referral from JANAK Farnsworth for       Visit 6/20/2024 :  Initial evaluation of right hip pain:  Ongoing issue for the past few months  No precipitating injury  Works as a  and is on his feet daily  He noticed that while walking he is right lateral hip will have sudden sharp pains and a \"locking sensation\" of his leg in general.  He states the symptoms linger for about a few minutes before improving with rest.  Outside walking, he cannot determine any specific pattern of aggravation of his pain.  He states that it can occur \"randomly\" during the day.  He denies any hip swelling, discoloration.  Denies any N/T of his right lower extremity.  He does notice that he tends to invert his right ankle frequently without ever \"spraining\" as he has never had pain.  He states he has had prior ankle sprains as a child.  He is unsure of his ankles related to his hip.  In regards to pain control, he tends to rest and when his hip is aggravated and this tends to alleviate the symptoms.  He does not take any pain medications.  This pain does not interfere with his sleep at night.  He denies any aggravation when lying on his right lateral hip.   Denies similar pain in the past.  Denies prior surgeries of his right hip or ankle.  He has seen his PCP for this issue and had imaging of his hip done already as noted above.  He has not seen formal physical therapy for this " "issue.          Medical, Surgical, Family, and Social History    Past Medical History:   Diagnosis Date    Allergic     Anxiety     Arthritis     Asthma     Asthma due to seasonal allergies     GERD (gastroesophageal reflux disease)     Headache(784.0)     HL (hearing loss)     Hypertension     Memory loss      Past Surgical History:   Procedure Laterality Date    COLONOSCOPY      NO PAST SURGERIES      UPPER GASTROINTESTINAL ENDOSCOPY      WISDOM TOOTH EXTRACTION       Social History   Social History     Substance and Sexual Activity   Alcohol Use Not Currently    Comment: every few months     Social History     Substance and Sexual Activity   Drug Use No     Social History     Tobacco Use   Smoking Status Former    Current packs/day: 0.00    Types: Cigarettes    Quit date: 2019    Years since quittin.5   Smokeless Tobacco Never   Tobacco Comments    vapes     Family History   Problem Relation Age of Onset    Fibromyalgia Mother     Rheum arthritis Father      Allergies   Allergen Reactions    Nuts - Food Allergy Anaphylaxis    Other Anaphylaxis     Tree nuts  Yogurt  Carrots  Limes    Shellfish-Derived Products - Food Allergy Anaphylaxis    Latex Itching    Peanut Oil - Food Allergy     Penicillins      Family allergy           Physical Exam  /88   Pulse 90   Ht 5' 9\" (1.753 m)   Wt 88.9 kg (196 lb)   BMI 28.94 kg/m²     Constitutional:  see vital signs  Gen: well-developed, normocephalic/atraumatic, well-groomed  Eyes: No inflammation or discharge of conjunctiva or lids; sclera clear   Pulmonary/Chest: Effort normal. No respiratory distress.     Right Hip Exam     Tenderness   The patient is experiencing no tenderness.     Range of Motion   Extension:  -30   Flexion:  70   External rotation:  70   Internal rotation:  30     Muscle Strength   Right hip normal muscle strength: resisted flexion aggravates lateral hip pain.  Abduction: 5/5   Adduction: 5/5   Flexion: 5/5     Tests   JANE: " negative    Other   Erythema: absent    Comments:  FADIR: negative - but aggravate  Resisted hip ER: 5-/5 and aggravates lateral hip pain           Ankle Examination (focused):     Gait: no limp      RIGHT   Inspection Erythema none    Edema none    Ecchymosis none        ROM:  Plantarflexion 50    Dorsiflexion 20        Strength Pronation 5/5    Supination 5/5    Foot plantarflexion 5/5    Foot dorsflexion 5/5        TTP AiTFL no    ATFL no    CFL no    PTFL no    Achilles no    Deltoid no    Peroneal no    Tib Ant no    Tib Post no        TTP (Bony) Prox Fibula no    Lat Malleolus no    Base of 5th MT no    Med Malleolus no    Navicular no    Talar Dome no        Anterior Drawer ATFL negative   Calcaneal Squeeze  negative   Tib-Fib Squeeze Test  negative   Talar Tilt (stab tib,DF foot,invert foot) CFL Slight laxity   ER Stress (stab tib,ER foot) High ankle negative   Eversion stress (stab tib, sherif foot) deltoid negative   MT Compression  negative         No calf tenderness to palpation     LE NV Exam: +2 DP/PT pulses   Sensation intact to light touch           Procedures

## 2024-06-20 NOTE — PATIENT INSTRUCTIONS
Treatment- Increase fluids, rest, acetaminophen or ibuprofen for fever/aches. OTC cold remedies OK but not too helpful, avoid OTC nasal Afrin-may use them for 1-2 days only due to rebound effect. Chicken soup, tea with honey and lemon (grandma's remedies work as well if not better than OTC's).    Viral symptoms should resolve in 1-2 weeks, please contact the office if issues persist.

## 2024-06-20 NOTE — PROGRESS NOTES
Ambulatory Visit  Name: Darnell Gottlieb      : 2000      MRN: [id number]  Encounter Provider: JANAK Farnsworth  Encounter Date: 2024   Encounter department: [facility]    Assessment & Plan     Per shared decision making, will decrease escitalopram to 5 mg daily due to side effects.  Will also start patient on Adderall XR 10 mg daily for ADHD symptoms, specifically issues with focus and concentration.  Patient denies a history of substance abuse.  PDMP was reviewed, no red flags noted.  Controlled nature of the medication discussed with the patient as well as side effects, he verbalized understanding.  Will have patient return in 3 months for reassessment.    Patient's blood pressure slightly elevated today.  Patient expressing interest in discontinuing valsartan.  Recommending continuation of valsartan 80 mg daily.    Per patient request, will order sildenafil for drug-induced erectile dysfunction.  I anticipate symptoms will improve with the lowering of escitalopram.    Patient also reporting minor cold symptoms today, requesting a zinc supplement be sent to the pharmacy.    1. Generalized anxiety disorder  -     escitalopram (LEXAPRO) 5 mg tablet; Take 1 tablet (5 mg total) by mouth daily  2. Attention deficit disorder, unspecified hyperactivity presence  -     amphetamine-dextroamphetamine (ADDERALL XR, 10MG,) 10 MG 24 hr capsule; Take 1 capsule (10 mg total) by mouth every morning Max Daily Amount: 10 mg  3. Essential hypertension  4. Drug-induced erectile dysfunction  -     sildenafil (VIAGRA) 50 MG tablet; Take 1 tablet (50 mg total) by mouth daily as needed for erectile dysfunction  5. Common cold  -     zinc gluconate 50 mg tablet; Take 1 tablet (50 mg total) by mouth daily as needed (colds)       History of Present Illness     Patient presents today for mental health follow-up.  At his last visit, his escitalopram was increased to 10 mg daily.  Patient today  "reports no improvement in his symptoms.  He continues to report intermittent anxiety and daily difficulties with concentration.  Patient also has a history of ADHD, was on Concerta in the past.  Patient reports excessive sweating and erectile dysfunction since increasing his Lexapro dose.        Review of Systems   Constitutional: Negative.    HENT: Negative.     Eyes: Negative.    Respiratory: Negative.     Cardiovascular: Negative.    Gastrointestinal: Negative.    Endocrine: Negative.    Genitourinary: Negative.    Musculoskeletal: Negative.    Skin: Negative.    Allergic/Immunologic: Negative.    Neurological: Negative.    Hematological: Negative.    Psychiatric/Behavioral:  Positive for decreased concentration. Negative for self-injury, sleep disturbance and suicidal ideas. The patient is nervous/anxious. The patient is not hyperactive.        Objective     /88 (BP Location: Left arm, Patient Position: Sitting, Cuff Size: Large)   Pulse 90   Ht 5' 9\" (1.753 m)   Wt 89 kg (196 lb 3.4 oz)   SpO2 96%   BMI 28.98 kg/m²     Physical Exam  Constitutional:       General: He is not in acute distress.     Appearance: Normal appearance.   HENT:      Nose: Nose normal.   Eyes:      Extraocular Movements: Extraocular movements intact.      Conjunctiva/sclera: Conjunctivae normal.   Pulmonary:      Effort: Pulmonary effort is normal. No respiratory distress.      Breath sounds: No wheezing.   Abdominal:      General: Abdomen is flat.   Musculoskeletal:         General: Normal range of motion.      Cervical back: Normal range of motion.   Skin:     Findings: No erythema or rash.   Neurological:      Mental Status: He is alert and oriented to person, place, and time.   Psychiatric:         Mood and Affect: Affect is flat.         Behavior: Behavior normal.       Administrative Statements     "

## 2024-06-28 ENCOUNTER — TELEPHONE (OUTPATIENT)
Age: 24
End: 2024-06-28

## 2024-06-28 DIAGNOSIS — F98.8 ATTENTION DEFICIT DISORDER, UNSPECIFIED HYPERACTIVITY PRESENCE: ICD-10-CM

## 2024-07-01 NOTE — PROGRESS NOTES
PT Evaluation     Today's date: 7/3/2024  Patient name: Darnell Gottlieb  : 2000  MRN: [id number]  Referring provider: Keon Bal MD  Dx:   Encounter Diagnosis     ICD-10-CM    1. Greater trochanteric bursitis of right hip  M70.61       2. Transient weakness of right lower extremity  R29.898       3. Chronic right hip pain  M25.551 Ambulatory Referral to Physical Therapy    G89.29       4. Greater trochanteric pain syndrome of right lower extremity  M25.551 Ambulatory Referral to Physical Therapy      5. Right ankle gives way  M25.371 Ambulatory Referral to Physical Therapy    chronic issue; no pain          Start Time: 1435  Stop Time: 1525  Total time in clinic (min): 50 minutes    Assessment  Impairments: abnormal or restricted ROM, activity intolerance, impaired physical strength, lacks appropriate home exercise program and pain with function  Symptom irritability: moderate    Assessment details: The patient is a 23 year old male 6 months post onset of R hip pain.  X-ray negative.  Pain range 0-8/10. Pt ambulating without limp, but painful with transfers, out of bed, after prolonged sitting, and up/down steps.   Pt exhibits limited ROM and strength in R hip, 4- to 4/5. Ambulates without limp, some pain with rise to stand.  Pain R Greater trochanter and proximal IT band. LEFS 55/80.  R ankle is unremarkable with ROM and strength.  This patient would benefit from a course of PT at this time in order to control pain and restore ROM, strength and function.   Understanding of Dx/Px/POC: good     Prognosis: good    Goals  STG- 4 weeks 24  1. I HEP  2. Decrease pain to 0-4/10  3. Increase AROM R hip flx to 100, abd to 34  4. Improve strength R hip to 4/5  5. Transfer from chair and bed without pain  6. Improve LEFS by 5-8 points    LTG- 8 weeks 24  1. Decrease paint to 0-2/10  2. Increase AROM R hip flx 110, abd 40  3. Increase R hip strength to 4+-5/5  4. Up/down steps without pain  5. Ambulate  45-60 minutes without pain  6. Improve LEFS to greater than 65/80      Plan  Patient would benefit from: skilled physical therapy and PT eval  Referral necessary: No  Planned modality interventions: cryotherapy and thermotherapy: hydrocollator packs    Planned therapy interventions: manual therapy, massage, neuromuscular re-education, patient/caregiver education, strengthening, stretching, therapeutic activities, therapeutic exercise, home exercise program and gait training    Frequency: 2x week  Duration in weeks: 8  Plan of Care beginning date: 7/3/2024  Plan of Care expiration date: 2024  Treatment plan discussed with: patient        Subjective Evaluation    History of Present Illness  Date of onset: 1/15/2024  Mechanism of injury: Pt reports onset of R hip pain 6 months ago and cannot recall any precipitating incident.  Pt states R hip pain is progressively getting worse, also reporting L hip pain with onset o 1 week ago.  X-rays negative.  Pt also describes infrequent sharp pain dorsal foot, happens 1x/day. Pt referred for outpatient PT.  Ankle pain has been present for 1 year.          Recurrent probem    Quality of life: excellent    Patient Goals  Patient goals for therapy: decreased pain, independence with ADLs/IADLs and return to sport/leisure activities  Patient goal: feel normal  Pain  Current pain ratin  At best pain ratin  At worst pain ratin  Location: above R hip, R ankle 0-7/10, L hip 0-4/10  Quality: sharp, knife-like and throbbing  Relieving factors: rest and change in position  Exacerbated by: R hip transfers, chair/bed; steps.    Social Support  Steps to enter house: no  Stairs in house: no   Lives in: one-story house  Lives with: spouse and young children    Employment status: working ([employer] )  Hand dominance: right      Diagnostic Tests  X-ray: normal  Treatments  Current treatment: physical therapy        Objective     Tenderness     Left Hip  "  Tenderness in the greater trochanter.     Right Hip   Tenderness in the greater trochanter.     Additional Tenderness Details  Pain proximal IT band R; R more painful than L    Neurological Testing     Sensation     Hip   Left Hip   Intact: light touch    Right Hip   Intact: light touch    Active Range of Motion   Left Hip   Flexion: 90 degrees   Abduction: 34 degrees     Right Hip   Flexion: 92 degrees with pain  Abduction: 28 degrees with pain    Additional Active Range of Motion Details  Rotations WNL    Strength/Myotome Testing     Left Hip   Normal muscle strength    Right Hip   Planes of Motion   Flexion: 4-  Extension: 4  Abduction: 4-    Additional Strength Details  B squat 50%    Tests     Right Hip   Positive Arpita.     Additional Tests Details  7/3/24- TUG/8\"  5xSTS/17\"    Ambulation   Weight-Bearing Status   Weight-Bearing Status (Left): full weight bearing   Weight-Bearing Status (Right): full weight-bearing    Assistive device used: none    Additional Weight-Bearing Status Details  R ankle ROM and strength is WFL.  HT walk intact    Ambulation: Level Surfaces   Ambulation without assistive device: independent    Ambulation: Stairs   Ascend stairs: independent  Pattern: reciprocal  Railings: one rail  Descend stairs: independent  Pattern: reciprocal  Railings: one rail    Observational Gait   Gait: within functional limits     General Comments:      Hip Comments   7/3/24- LEFS 55/80      Flowsheet Rows      Flowsheet Row Most Recent Value   PT/OT G-Codes    Current Score 49   Projected Score 70   FOTO information reviewed Yes   Assessment Type Evaluation               Precautions: R trochanteric bursitis, R ankle weakness      Manuals 7/3/24            Foam roller massage to R hip/IT band CRR            Passive stretch IT band CRR                                      Neuro Re-Ed             Instruction in HEP Written HEP bridge, clamshell, knee to chest, LTR, supine IT band stretch                      " "                                                                    Ther Ex             K to C 5x  hold 5\" l/R            LTR 5x L/R            Supine IT band stretch with strap 3 x 20\"            bridge 10x            clamshell 10x            Seated iso hip abd/add             Sit to stand                          Ther Activity             NUSTEP NV                         Gait Training             Wide walk, back, march, side step NV                         Modalities                                            "

## 2024-07-03 ENCOUNTER — EVALUATION (OUTPATIENT)
Age: 24
End: 2024-07-03
Payer: COMMERCIAL

## 2024-07-03 DIAGNOSIS — R29.898 TRANSIENT WEAKNESS OF RIGHT LOWER EXTREMITY: ICD-10-CM

## 2024-07-03 DIAGNOSIS — M25.371 RIGHT ANKLE GIVES WAY: ICD-10-CM

## 2024-07-03 DIAGNOSIS — M70.61 GREATER TROCHANTERIC BURSITIS OF RIGHT HIP: Primary | ICD-10-CM

## 2024-07-03 DIAGNOSIS — M25.551 GREATER TROCHANTERIC PAIN SYNDROME OF RIGHT LOWER EXTREMITY: ICD-10-CM

## 2024-07-03 DIAGNOSIS — M25.551 CHRONIC RIGHT HIP PAIN: ICD-10-CM

## 2024-07-03 DIAGNOSIS — G89.29 CHRONIC RIGHT HIP PAIN: ICD-10-CM

## 2024-07-03 PROCEDURE — 97140 MANUAL THERAPY 1/> REGIONS: CPT | Performed by: PHYSICAL THERAPIST

## 2024-07-03 PROCEDURE — 97161 PT EVAL LOW COMPLEX 20 MIN: CPT | Performed by: PHYSICAL THERAPIST

## 2024-07-03 PROCEDURE — 97110 THERAPEUTIC EXERCISES: CPT | Performed by: PHYSICAL THERAPIST

## 2024-07-08 ENCOUNTER — APPOINTMENT (OUTPATIENT)
Age: 24
End: 2024-07-08
Payer: COMMERCIAL

## 2024-07-09 ENCOUNTER — APPOINTMENT (OUTPATIENT)
Age: 24
End: 2024-07-09
Payer: COMMERCIAL

## 2024-07-09 DIAGNOSIS — M25.551 CHRONIC RIGHT HIP PAIN: ICD-10-CM

## 2024-07-09 DIAGNOSIS — R29.898 TRANSIENT WEAKNESS OF RIGHT LOWER EXTREMITY: ICD-10-CM

## 2024-07-09 DIAGNOSIS — M25.551 GREATER TROCHANTERIC PAIN SYNDROME OF RIGHT LOWER EXTREMITY: ICD-10-CM

## 2024-07-09 DIAGNOSIS — M25.371 RIGHT ANKLE GIVES WAY: ICD-10-CM

## 2024-07-09 DIAGNOSIS — G89.29 CHRONIC RIGHT HIP PAIN: ICD-10-CM

## 2024-07-09 DIAGNOSIS — M70.61 GREATER TROCHANTERIC BURSITIS OF RIGHT HIP: Primary | ICD-10-CM

## 2024-07-09 NOTE — PROGRESS NOTES
"Daily Note     Today's date: 2024  Patient name: Darnell Gottlieb  : 2000  MRN: [id number]  Referring provider: Keon Bal MD  Dx:   Encounter Diagnosis     ICD-10-CM    1. Greater trochanteric bursitis of right hip  M70.61       2. Greater trochanteric pain syndrome of right lower extremity  M25.551       3. Transient weakness of right lower extremity  R29.898       4. Right ankle gives way  M25.371       5. Chronic right hip pain  M25.551     G89.29                      Subjective: ***      Objective: See treatment diary below      Assessment: Darnell Gottlieb tolerated today's treatment session well.  Patient education provided on {KH education options:56452}.  Darnell completed all TE with good form and no adverse reactions. ***  Darnell continues to benefit from skilled OPPT services to address {khdailysymptoms:43924}. Will continue to address functional deficits and focus on progression of POC per patient tolerance.      Patient performed {KUAerobic:67011} to increase blood flow to the area being treated, prepare the muscles for strength training and stretching, improve overall tolerance to activity, and aerobic endurance.      Plan: Continue per plan of care.  Progress treatment as tolerated.       Precautions: R trochanteric bursitis, R ankle weakness      Manuals 7/3/24 7/9           Foam roller massage to R hip/IT band CRR            Passive stretch IT band CRR                                      Neuro Re-Ed             Instruction in HEP Written HEP bridge, clamshell, knee to chest, LTR, supine IT band stretch                                                                                          Ther Ex             K to C 5x  hold 5\" l/R            LTR 5x L/R            Supine IT band stretch with strap 3 x 20\"            bridge 10x            clamshell 10x            Seated iso hip abd/add             Sit to stand                          Ther Activity             NUSTEP NV                       "   Gait Training             Wide walk, back, march, side step NV                         Modalities

## 2024-07-11 ENCOUNTER — OFFICE VISIT (OUTPATIENT)
Age: 24
End: 2024-07-11
Payer: COMMERCIAL

## 2024-07-11 DIAGNOSIS — M25.371 RIGHT ANKLE GIVES WAY: Primary | ICD-10-CM

## 2024-07-11 PROCEDURE — 97110 THERAPEUTIC EXERCISES: CPT

## 2024-07-11 PROCEDURE — 97140 MANUAL THERAPY 1/> REGIONS: CPT

## 2024-07-11 NOTE — PROGRESS NOTES
"Daily Note     Today's date: 2024  Patient name: Darnell Gottlieb  : 2000  MRN: [id number]  Referring provider: Keon Bal MD  Dx:   Encounter Diagnosis     ICD-10-CM    1. Right ankle gives way  M25.371             Start Time: 1440  Stop Time: 1524  Total time in clinic (min): 44 minutes    Subjective: Patient reports no new changes since IE. Patient admits with his busy life he has not been able to engage in HEP.      Objective: See treatment diary below      Assessment: Continued with PT POC outlined below. No inc discomfort noted with activities. INC challenge noted with pelvic mobility during bridges. Tremors noted in adduction with clam shells in supine and was inc with dec PPT. Patient told to make doctor aware of any inc tremors.  Patient would benefit from continued PT to improve function.        Plan: Continue per plan of care.  Progress treatment as tolerated.       Precautions: R trochanteric bursitis, R ankle weakness      Manuals 7/3/24 7/9 7/11          Foam roller massage to R hip/IT band CRR  LQ          Passive stretch IT band CRR  LQ                                    Neuro Re-Ed             Instruction in HEP Written HEP bridge, clamshell, knee to chest, LTR, supine IT band stretch  Remind patient importance of HEP                                                                                        Ther Ex             K to C 5x  hold 5\" l/R  10x5\"          LTR 5x L/R            Supine IT band stretch with strap 3 x 20\"  3 x 20\"          bridge 10x  10x            clamshell 10x  10x          Seated iso hip abd/add             Sit to stand                          Ther Activity             NUSTEP NV  5 min LVL 5                       Gait Training             Wide walk, back, march, side step NV  2 laps at mirror                       Modalities                                            "

## 2024-07-14 NOTE — PROGRESS NOTES
"Daily Note     Today's date: 7/15/2024  Patient name: Darnell Gottlieb  : 2000  MRN: [id number]  Referring provider: Keon Bal MD  Dx:   Encounter Diagnosis     ICD-10-CM    1. Right ankle gives way  M25.371       2. Greater trochanteric bursitis of right hip  M70.61       3. Transient weakness of right lower extremity  R29.898       4. Greater trochanteric pain syndrome of right lower extremity  M25.551           Start Time: 1625  Stop Time: 1715  Total time in clinic (min): 50 minutes    Subjective: LBP  6/10, hip pain 5/10.  Pain not constant.       Objective: See treatment diary below      Assessment: . Patient performed Nustep aerobic exercise to increase blood flow to the area being treated, prepare the muscles for strength training and stretching, improve overall tolerance to activity, and aerobic endurance. Progressed with closed chain strengthening program.  Pain with standing hip flexion R. Pt reports R hip pain with rolling L/R.  No pain R Greater Trochanter today.  Plan to continue PT wit goal of resuming all activity with minimal pain.    Plan: Progress treatment as tolerated.       Precautions: R trochanteric bursitis, R ankle weakness      Manuals 7/3/24 7/9 7/11 7/15         Foam roller massage to R hip/IT band CRR  LQ CRR  R IT S/Lband and LS in prone         Passive stretch IT band CRR  LQ CRR                                   Neuro Re-Ed             Instruction in HEP Written HEP bridge, clamshell, knee to chest, LTR, supine IT band stretch  Remind patient importance of HEP                                                                                        Ther Ex             K to C 5x  hold 5\" l/R  10x5\" 5x hold 5\"         LTR 5x L/R   5x hold 5\"         Supine IT band stretch with strap 3 x 20\"  3 x 20\" S/L rot stretch/R IT band    3 x 20\"         bridge 10x  10x   10x    Green TB around knees         clamshell 10x  10x 10x green TB  R         Seated iso hip abd/add   10x/10x " "Supine ADD    10x hold 5\"         Sit to stand   10x 10x         PPT    5x hold 5\"         Ther Activity             NUSTEP NV  5 min LVL 5 8 min  L5                      Gait Training             Wide walk, back, march, side step NV  2 laps at mirror 2 x 30'    all                      Modalities                                              "

## 2024-07-15 ENCOUNTER — OFFICE VISIT (OUTPATIENT)
Age: 24
End: 2024-07-15
Payer: COMMERCIAL

## 2024-07-15 DIAGNOSIS — R29.898 TRANSIENT WEAKNESS OF RIGHT LOWER EXTREMITY: ICD-10-CM

## 2024-07-15 DIAGNOSIS — M25.371 RIGHT ANKLE GIVES WAY: Primary | ICD-10-CM

## 2024-07-15 DIAGNOSIS — M25.551 GREATER TROCHANTERIC PAIN SYNDROME OF RIGHT LOWER EXTREMITY: ICD-10-CM

## 2024-07-15 DIAGNOSIS — M70.61 GREATER TROCHANTERIC BURSITIS OF RIGHT HIP: ICD-10-CM

## 2024-07-15 PROCEDURE — 97530 THERAPEUTIC ACTIVITIES: CPT | Performed by: PHYSICAL THERAPIST

## 2024-07-15 PROCEDURE — 97140 MANUAL THERAPY 1/> REGIONS: CPT | Performed by: PHYSICAL THERAPIST

## 2024-07-15 PROCEDURE — 97110 THERAPEUTIC EXERCISES: CPT | Performed by: PHYSICAL THERAPIST

## 2024-07-15 NOTE — TELEPHONE ENCOUNTER
PA for Amphetamine-Dextroamphet ER 10MG er capsules    Submitted via    [x]CMM-KEY [id number]  []Surescripts-Case ID #   []Faxed to plan   []Other website   []Phone call Case ID #     Office notes sent, clinical questions answered. Awaiting determination    Turnaround time for your insurance to make a decision on your Prior Authorization can take 7-21 business days.

## 2024-07-16 ENCOUNTER — TELEPHONE (OUTPATIENT)
Age: 24
End: 2024-07-16

## 2024-07-16 NOTE — TELEPHONE ENCOUNTER
PA for AMPHETAMINE 10MG Denied    Reason:(Screenshot if applicable)        Message sent to office clinical pool Yes    Denial letter scanned into Media Yes    Appeal started No ( Provider will need to decide if appeal is warranted and send clinical documentation to Prior Authorization Team for initiation.)    **Please follow up with your patient regarding denial and next steps**

## 2024-07-17 NOTE — TELEPHONE ENCOUNTER
I'm not quite sure how to go about this as I've never seen the person before. Rex did document that he did not have a history of substance abuse, reviewed PDMP and also mentioned that he'd been diagnosed before and was on Concerta so not really sure why it was denied. I guess I can a visit with him on the ADHD and specially include the 3 criteria?

## 2024-07-18 ENCOUNTER — APPOINTMENT (OUTPATIENT)
Age: 24
End: 2024-07-18
Payer: COMMERCIAL

## 2024-07-22 ENCOUNTER — OFFICE VISIT (OUTPATIENT)
Age: 24
End: 2024-07-22
Payer: COMMERCIAL

## 2024-07-22 DIAGNOSIS — M25.371 RIGHT ANKLE GIVES WAY: Primary | ICD-10-CM

## 2024-07-22 DIAGNOSIS — M70.61 GREATER TROCHANTERIC BURSITIS OF RIGHT HIP: ICD-10-CM

## 2024-07-22 DIAGNOSIS — R29.898 TRANSIENT WEAKNESS OF RIGHT LOWER EXTREMITY: ICD-10-CM

## 2024-07-22 DIAGNOSIS — G89.29 CHRONIC RIGHT HIP PAIN: ICD-10-CM

## 2024-07-22 DIAGNOSIS — M25.551 CHRONIC RIGHT HIP PAIN: ICD-10-CM

## 2024-07-22 DIAGNOSIS — M25.551 GREATER TROCHANTERIC PAIN SYNDROME OF RIGHT LOWER EXTREMITY: ICD-10-CM

## 2024-07-22 PROCEDURE — 97110 THERAPEUTIC EXERCISES: CPT | Performed by: PHYSICAL THERAPIST

## 2024-07-22 PROCEDURE — 97530 THERAPEUTIC ACTIVITIES: CPT | Performed by: PHYSICAL THERAPIST

## 2024-07-22 NOTE — PROGRESS NOTES
"Daily Note     Today's date: 2024  Patient name: Darnell Gottlieb  : 2000  MRN: [id number]  Referring provider: Keon Bal MD  Dx:   Encounter Diagnosis     ICD-10-CM    1. Right ankle gives way  M25.371       2. Greater trochanteric bursitis of right hip  M70.61       3. Transient weakness of right lower extremity  R29.898       4. Greater trochanteric pain syndrome of right lower extremity  M25.551       5. Chronic right hip pain  M25.551     G89.29           Start Time: 1445  Stop Time: 1530  Total time in clinic (min): 45 minutes    Subjective: Pain free when sitting.  Pin range 0-8/10.  Pt denies weakness in R ankle.       Objective: See treatment diary below      Assessment: . Patient performed Nustep aerobic exercise to increase blood flow to the area being treated, prepare the muscles for strength training and stretching, improve overall tolerance to activity, and aerobic endurance. Progressing with open and close chain strengthening, ashleigh interventions, and endurance activity. Plan to continue PT with goal of resuming all activity without pain.     Plan: Progress treatment as tolerated.       Precautions: R trochanteric bursitis, R ankle weakness      Manuals 7/3/24 7/9 7/11 7/15 7/22        Foam roller massage to R hip/IT band CRR  LQ CRR  R IT S/Lband and LS in prone CRR        Passive stretch IT band CRR  LQ CRR -                                  Neuro Re-Ed             Instruction in HEP Written HEP bridge, clamshell, knee to chest, LTR, supine IT band stretch  Remind patient importance of HEP                                                                                        Ther Ex             K to C 5x  hold 5\" l/R  10x5\" 5x hold 5\" 5x hold 5\"        LTR 5x L/R   5x hold 5\" 5x hold 5\"        Supine IT band stretch with strap 3 x 20\"  3 x 20\" S/L rot stretch/R IT band    3 x 20\" 3 x 20\"    L/R        bridge 10x  10x   10x    Green TB around knees 10x green TB        Bird dog     " "10x L/R        Hip hinge/ER stretch     3 x 10\"        clamshell 10x  10x 10x green TB  R 10x green TB        Seated iso hip abd/add   10x/10x Supine ADD    10x hold 5\" 20x/20x GTB        Bird dog             Sit to stand   10x 10x 10x        PPT    5x hold 5\" 5x hold 5\"        Ther Activity             NUSTEP NV  5 min LVL 5 8 min  L5 10' L5        Hip hike     10x L/R        Gait Training             Wide walk, back, march, side step NV  2 laps at mirror 2 x 30'    all 2 x 30\"  all        Step up/down/side     10x L/R  10x all        Modalities                                                "

## 2024-07-24 NOTE — PROGRESS NOTES
PT Re-Evaluation     Today's date: 2024  Patient name: Darnell Gottlieb  : 2000  MRN: [id number]  Referring provider: Keon Bal MD  Dx:   Encounter Diagnosis     ICD-10-CM    1. Right ankle gives way  M25.371       2. Greater trochanteric bursitis of right hip  M70.61 PT plan of care cert/re-cert      3. Transient weakness of right lower extremity  R29.898 PT plan of care cert/re-cert      4. Greater trochanteric pain syndrome of right lower extremity  M25.551 PT plan of care cert/re-cert      5. Chronic right hip pain  M25.551 PT plan of care cert/re-cert    G89.29           Start Time: 1445  Stop Time: 1540  Total time in clinic (min): 55 minutes    Assessment  Impairments: abnormal or restricted ROM, activity intolerance, impaired physical strength, lacks appropriate home exercise program and pain with function  Functional limitations: pain in LB when reaching to floor, pain with heavy lifting, pain when getting up in AM  Symptom irritability: moderate    Assessment details: The patient is a 23 year old male 7 months post onset of R hip pain.  X-ray negative.  Pain rage 0-7/10, only painful with heavy lifting in LS spine, R hip pain 0-4/10. L hip 0-2/10. Improved hip strength noted R side, continues to be weak compared to L.  Pt able to squat to 75% without pain. Pain with palpation R Greater Trochanter. Ambulates without limp.  LEFS 65/80 (improved from 55/80).  Plan to continue PT 2x/week for 6-8 weeks with goal of resuming all activity with minimal pain.  Understanding of Dx/Px/POC: good     Prognosis: good    Goals  STG- 4 weeks 24  1. I HEP (MET)  2. Decrease pain to 0-4/10(NOT MET)  3. Increase AROM R hip flx to 100, abd to 34(MET)  4. Improve strength R hip to 4/5(MET)  5. Transfer from chair and bed without pain(MET)  6. Improve LEFS by 5-8 points(MET)    LTG- 8 weeks 24  1. Decrease paint to 0-2/10  2. Increase AROM R hip flx 110, abd 40  3. Increase R hip strength to 4+-5/5  4.  Up/down steps without pain  5. Ambulate 45-60 minutes without pain  6. Improve LEFS to greater than 65/80      Plan  Patient would benefit from: skilled physical therapy and PT eval  Referral necessary: No  Planned modality interventions: cryotherapy and thermotherapy: hydrocollator packs    Planned therapy interventions: manual therapy, massage, neuromuscular re-education, patient/caregiver education, strengthening, stretching, therapeutic activities, therapeutic exercise, home exercise program and gait training    Frequency: 2x week  Duration in weeks: 8  Plan of Care beginning date: 2024  Plan of Care expiration date: 2024  Treatment plan discussed with: patient        Subjective Evaluation    History of Present Illness  Date of onset: 1/15/2024  Mechanism of injury: 24- Pt no longer C/O ankle pain.  Notes improvement LS spine and both hips, although low back is still to 8/10 pain when lifting heavy objects.    Pt reports onset of R hip pain 6 months ago and cannot recall any precipitating incident.  Pt states R hip pain is progressively getting worse, also reporting L hip pain with onset o 1 week ago.  X-rays negative.  Pt also describes infrequent sharp pain dorsal foot, happens 1x/day. Pt referred for outpatient PT.  Ankle pain has been present for 1 year.          Recurrent probem    Quality of life: excellent    Patient Goals  Patient goals for therapy: decreased pain, independence with ADLs/IADLs and return to sport/leisure activities  Patient goal: feel normal  Pain  Current pain ratin  At best pain ratin  At worst pain ratin  Location: above R hip, R ankle 0-7/10, L hip 0-4/10  Quality: sharp, knife-like and throbbing  Relieving factors: rest and change in position  Exacerbated by: R hip transfers, chair/bed; steps.    Social Support  Steps to enter house: no  Stairs in house: no   Lives in: one-story house  Lives with: spouse and young children    Employment status: working ([employer]  " )  Hand dominance: right      Diagnostic Tests  X-ray: normal  Treatments  Current treatment: physical therapy        Objective     Tenderness     Left Hip   No tenderness in the greater trochanter.     Right Hip   Tenderness in the greater trochanter.     Additional Tenderness Details  Pain proximal IT band R; R more painful than L    Neurological Testing     Sensation     Hip   Left Hip   Intact: light touch    Right Hip   Intact: light touch    Active Range of Motion   Left Hip   Flexion: 132 degrees   Abduction: 34 degrees     Right Hip   Flexion: 130 degrees with pain  Abduction: 34 degrees with pain    Additional Active Range of Motion Details  Rotations WNL    Strength/Myotome Testing     Left Hip   Normal muscle strength    Right Hip   Planes of Motion   Flexion: 4  Extension: 4  Abduction: 4+    Additional Strength Details  B squat 75%    Tests     Right Hip   Negative Arpita.     Additional Tests Details  7/3/24- TUG/8\"  5xSTS/17\"    7/25/24- TUG/8\", 5xSTS 15\"    Ambulation   Weight-Bearing Status   Weight-Bearing Status (Left): full weight bearing   Weight-Bearing Status (Right): full weight-bearing    Assistive device used: none    Additional Weight-Bearing Status Details  R ankle ROM and strength is WFL.  HT walk intact    Ambulation: Level Surfaces   Ambulation without assistive device: independent    Ambulation: Stairs   Ascend stairs: independent  Pattern: reciprocal  Railings: one rail  Descend stairs: independent  Pattern: reciprocal  Railings: one rail    Observational Gait   Gait: within functional limits     General Comments:      Hip Comments   7/3/24- LEFS 55/80    7/25/24- LEFS 67/80      Flowsheet Rows      Flowsheet Row Most Recent Value   PT/OT G-Codes    Current Score 49   Projected Score 72   FOTO information reviewed Yes   Assessment Type Re-evaluation                 Precautions: R trochanteric bursitis, R ankle weakness        Manuals 7/3/24 7/9 7/11 " "7/15 7/22 7/24       Foam roller massage to R hip/IT band CRR  LQ CRR  R IT S/Lband and LS in prone CRR CRR foam roller massage R       Passive stretch IT band CRR  LQ CRR -                           FOTO/RE       Neuro Re-Ed             Instruction in HEP Written HEP bridge, clamshell, knee to chest, LTR, supine IT band stretch  Remind patient importance of HEP                                                                                        Ther Ex             K to C 5x  hold 5\" l/R  10x5\" 5x hold 5\" 5x hold 5\" X hold 5\"       LTR 5x L/R   5x hold 5\" 5x hold 5\" 5x hold 5\"       Supine IT band stretch with strap 3 x 20\"  3 x 20\" S/L rot stretch/R IT band    3 x 20\" 3 x 20\"    L/R Stand IT band    2 x 20\" L/R       bridge 10x  10x   10x    Green TB around knees 10x green TB 20x blue TB       Bird dog     10x L/R 10x L/R       Hip hinge/ER stretch     3 x 10\" -       clamshell 10x  10x 10x green TB  R 10x green TB 20x L/R    Blue TB       Seated iso hip abd/add   10x/10x Supine ADD    10x hold 5\" 20x/20x GTB Iso add    20x       Bird dog      10x       Sit to stand   10x 10x 10x 10x       PPT    5x hold 5\" 5x hold 5\" 5x hold 5\"       Ther Activity             NUSTEP NV  5 min LVL 5 8 min  L5 10' L5 10' L5       Hip hike     10x L/R 20x L/R       Leg press      B  135 2 x 20    R 2 x 20 75#       Gait Training             Wide walk, back, march, side step NV  2 laps at mirror 2 x 30'    all 2 x 30\"  all 2 x 30'    Yellow COB       Step up/down/side     10x L/R  10x all 6'  10x R up/down/side       Modalities                                         "

## 2024-07-25 ENCOUNTER — EVALUATION (OUTPATIENT)
Age: 24
End: 2024-07-25
Payer: COMMERCIAL

## 2024-07-25 DIAGNOSIS — M25.371 RIGHT ANKLE GIVES WAY: Primary | ICD-10-CM

## 2024-07-25 DIAGNOSIS — M70.61 GREATER TROCHANTERIC BURSITIS OF RIGHT HIP: ICD-10-CM

## 2024-07-25 DIAGNOSIS — G89.29 CHRONIC RIGHT HIP PAIN: ICD-10-CM

## 2024-07-25 DIAGNOSIS — M25.551 CHRONIC RIGHT HIP PAIN: ICD-10-CM

## 2024-07-25 DIAGNOSIS — R29.898 TRANSIENT WEAKNESS OF RIGHT LOWER EXTREMITY: ICD-10-CM

## 2024-07-25 DIAGNOSIS — M25.551 GREATER TROCHANTERIC PAIN SYNDROME OF RIGHT LOWER EXTREMITY: ICD-10-CM

## 2024-07-25 PROCEDURE — 97110 THERAPEUTIC EXERCISES: CPT | Performed by: PHYSICAL THERAPIST

## 2024-07-25 PROCEDURE — 97530 THERAPEUTIC ACTIVITIES: CPT | Performed by: PHYSICAL THERAPIST

## 2024-07-25 PROCEDURE — 97140 MANUAL THERAPY 1/> REGIONS: CPT | Performed by: PHYSICAL THERAPIST

## 2024-07-25 NOTE — PROGRESS NOTES
"Daily Note     Today's date: 2024  Patient name: Darnell Gottlieb  : 2000  MRN: [id number]  Referring provider: Keon Bal MD  Dx:   Encounter Diagnosis     ICD-10-CM    1. Right ankle gives way  M25.371       2. Greater trochanteric bursitis of right hip  M70.61       3. Transient weakness of right lower extremity  R29.898       4. Greater trochanteric pain syndrome of right lower extremity  M25.551       5. Chronic right hip pain  M25.551     G89.29           Start Time: 815  Stop Time: 905  Total time in clinic (min): 50 minutes    Subjective:       Objective: See treatment diary below      Assessment: . Patient performed X-ride  aerobic exercise to increase blood flow to the area being treated, prepare the muscles for strength training and stretching, improve overall tolerance to activity, and aerobic endurance. Progressing with open and close chain strengthening B hips, endurance activity and spinal stabilization ex.  Pt reported some pain in low back when on ellyptical.  Plan to continue PT emphasizing strengthening in order to resume all activity with minimal discomfort.   Plan: Progress treatment as tolerated.       Precautions: R trochanteric bursitis, R ankle weakness        Manuals 7/3/24 7/9 7/11 7/15 7/22 7/24 7/29      Foam roller massage to R hip/IT band CRR  LQ CRR  R IT S/Lband and LS in prone CRR CRR foam roller massage R CRR  Foam roller R hip      Passive stretch IT band CRR  LQ CRR -                           FOTO/RE       Neuro Re-Ed             Instruction in HEP Written HEP bridge, clamshell, knee to chest, LTR, supine IT band stretch  Remind patient importance of HEP                                                                                        Ther Ex             K to C 5x  hold 5\" l/R  10x5\" 5x hold 5\" 5x hold 5\" X hold 5\" 5x L/R hold 5\"      LTR 5x L/R   5x hold 5\" 5x hold 5\" 5x hold 5\" 5x hold 5\"            Supine IT band stretch with strap 3 x 20\"  3 x 20\" S/L " "rot stretch/R IT band    3 x 20\" 3 x 20\"    L/R Stand IT band    2 x 20\" L/R -      bridge 10x  10x   10x    Green TB around knees 10x green TB 20x blue TB 20x blue TB      Bird dog     10x L/R 10x L/R 10x L/R      Hip hinge/ER stretch     3 x 10\" -       clamshell 10x  10x 10x green TB  R 10x green TB 20x L/R    Blue TB 20x/20x L/R blue TB      Seated iso hip abd/add   10x/10x Supine ADD    10x hold 5\" 20x/20x GTB Iso add    20x -      Bird dog      10x 10x      Sit to stand   10x 10x 10x 10x -      PPT    5x hold 5\" 5x hold 5\" 5x hold 5\" 5x old 5\"      Ther Activity             NUSTEP NV  5 min LVL 5 8 min  L5 10' L5 10' L5 Ellyptical  L2  8 min      Hip hike     10x L/R 20x L/R 20x L/R      Leg press      B  135 2 x 20    R 2 x 20 75# B 135#  2 x 20    Uni L/R 20x 85#      Gait Training             Wide walk, back, march, side step NV  2 laps at mirror 2 x 30'    all 2 x 30\"  all 2 x 30'    Yellow COB 2 x 30 wide walk/side step 2 x 30'      Step up/down/side     10x L/R  10x all 6'  10x R up/down/side -      Modalities                                              "

## 2024-07-29 ENCOUNTER — OFFICE VISIT (OUTPATIENT)
Age: 24
End: 2024-07-29
Payer: COMMERCIAL

## 2024-07-29 VITALS
BODY MASS INDEX: 31.1 KG/M2 | SYSTOLIC BLOOD PRESSURE: 129 MMHG | DIASTOLIC BLOOD PRESSURE: 86 MMHG | HEIGHT: 69 IN | HEART RATE: 75 BPM | WEIGHT: 210 LBS

## 2024-07-29 DIAGNOSIS — M70.61 GREATER TROCHANTERIC BURSITIS OF RIGHT HIP: ICD-10-CM

## 2024-07-29 DIAGNOSIS — G89.29 CHRONIC RIGHT HIP PAIN: Primary | ICD-10-CM

## 2024-07-29 DIAGNOSIS — R29.898 TRANSIENT WEAKNESS OF RIGHT LOWER EXTREMITY: ICD-10-CM

## 2024-07-29 DIAGNOSIS — M25.551 GREATER TROCHANTERIC PAIN SYNDROME OF RIGHT LOWER EXTREMITY: ICD-10-CM

## 2024-07-29 DIAGNOSIS — M25.551 CHRONIC RIGHT HIP PAIN: Primary | ICD-10-CM

## 2024-07-29 DIAGNOSIS — M25.551 CHRONIC RIGHT HIP PAIN: ICD-10-CM

## 2024-07-29 DIAGNOSIS — M25.371 RIGHT ANKLE GIVES WAY: Primary | ICD-10-CM

## 2024-07-29 DIAGNOSIS — M25.371 RIGHT ANKLE GIVES WAY: ICD-10-CM

## 2024-07-29 DIAGNOSIS — G89.29 CHRONIC RIGHT HIP PAIN: ICD-10-CM

## 2024-07-29 PROCEDURE — 99213 OFFICE O/P EST LOW 20 MIN: CPT | Performed by: STUDENT IN AN ORGANIZED HEALTH CARE EDUCATION/TRAINING PROGRAM

## 2024-07-29 PROCEDURE — 97110 THERAPEUTIC EXERCISES: CPT | Performed by: PHYSICAL THERAPIST

## 2024-07-29 PROCEDURE — 97530 THERAPEUTIC ACTIVITIES: CPT | Performed by: PHYSICAL THERAPIST

## 2024-07-29 NOTE — PROGRESS NOTES
"No diagnosis found.    No orders of the defined types were placed in this encounter.       Imaging Studies (I personally reviewed images in PACS and report):    X-ray right hip 5/13/2024: No acute osseous abnormalities.  No significant degenerative changes.  Unremarkable soft tissues.    IMPRESSION:  Chronic intermittent right lateral hip pain  Ongoing for months, atraumatic  \"Randomly\" occurs while walking   Radiographs unremarkable  Suspected greater trochanteric pain syndrome - pain elicited with ER testing of hip  Additionally notes history of recurrent right ankle inversion instability - history of prior right ankle sprains in the past and unsure if related to hip - slight laxity on talar tilt to suggest possible chronic CFL injury but no other abnormalities/pain on ankle/foot exam noted  Reported improvement since last visit in which patient reports decreased intensity and frequency of lateral right hip pain.    PLAN:    Clinical exam and radiographic imaging reviewed with patient today, with impression as per above. I have discussed with the patient the pathophysiology of this diagnosis and reviewed how the examination correlates with this diagnosis.    Reassured patient in regards to his reported progressive improvement since last visit.  I counseled he can continue working with physical therapy and transition to a home exercise program when they feel he is able to be transition.  In regards to pain control I counseled use of acetaminophen, NSAIDs, heat/ice therapy 20 minutes on/off.  I did discuss about the potential for greater trochanteric bursa cortisone injection but patient deferred this option given his progressive improvement.  Counseled the alternative option if there is not progressive improvement or if there is a plateau of the effectiveness is to consider an MRI of his right hip without contrast.  Patient agreeable to hold off this intervention as well given his progressive improvement.  No " "follow-ups on file.    Portions of the record may have been created with voice recognition software. Occasional wrong word or \"sound a like\" substitutions may have occurred due to the inherent limitations of voice recognition software. Read the chart carefully and recognize, using context, where substitutions have occurred.     CHIEF COMPLAINT:  Chief Complaint   Patient presents with    Right Hip - Follow-up         HPI:  Darnell Gottlieb is a 23 y.o. male  who presents in regards to      Visit 7/29/2024:  Follow-up evaluation of right lateral hip pain  Suspected greater trochanteric pain syndrome  Patient reports some improvement since last visit.  He has been attending physical therapy and he feels that it has been making a difference in regards to reducing the frequency and intensity of pain over his lateral hip.  He states it is not completely resolved as it still can be aggravated when transitioning from sitting to standing.  He states for most of the time the pain is mild/tolerable and rates it a 1/10 on a pain scale.  He states at worst it can be as bad as 7/10.  He states the pain is not severe enough to warrant taking any pain medications.  He denies any low back pain.  Denies any right hip pain radiating to his low back or his right lower extremity.  Denies any right lower extremity numbness/tingling.  Denies bowel/bladder incontinence.  Patient reports overall that he does feel a difference going forward and still wishes to continue the conservative management route.    In regards to his right ankle instability he also reports it has been improving as well with physical therapy.    Visit 6/20/2024 :  Initial evaluation of right hip pain:  Ongoing issue for the past few months  No precipitating injury  Works as a  and is on his feet daily  He noticed that while walking he is right lateral hip will have sudden sharp pains and a \"locking sensation\" of his leg in general.  He states the symptoms " "linger for about a few minutes before improving with rest.  Outside walking, he cannot determine any specific pattern of aggravation of his pain.  He states that it can occur \"randomly\" during the day.  He denies any hip swelling, discoloration.  Denies any N/T of his right lower extremity.  He does notice that he tends to invert his right ankle frequently without ever \"spraining\" as he has never had pain.  He states he has had prior ankle sprains as a child.  He is unsure of his ankles related to his hip.  In regards to pain control, he tends to rest and when his hip is aggravated and this tends to alleviate the symptoms.  He does not take any pain medications.  This pain does not interfere with his sleep at night.  He denies any aggravation when lying on his right lateral hip.   Denies similar pain in the past.  Denies prior surgeries of his right hip or ankle.  He has seen his PCP for this issue and had imaging of his hip done already as noted above.  He has not seen formal physical therapy for this issue.          Medical, Surgical, Family, and Social History    Past Medical History:   Diagnosis Date    Allergic     Anxiety     Arthritis     Asthma     Asthma due to seasonal allergies     GERD (gastroesophageal reflux disease)     Headache(784.0)     HL (hearing loss)     Hypertension     Memory loss      Past Surgical History:   Procedure Laterality Date    COLONOSCOPY      NO PAST SURGERIES      UPPER GASTROINTESTINAL ENDOSCOPY      WISDOM TOOTH EXTRACTION       Social History   Social History     Substance and Sexual Activity   Alcohol Use Not Currently    Comment: every few months     Social History     Substance and Sexual Activity   Drug Use No     Social History     Tobacco Use   Smoking Status Former    Current packs/day: 0.00    Types: Cigarettes    Quit date: 2019    Years since quittin.6    Passive exposure: Past   Smokeless Tobacco Current   Tobacco Comments    vapes     Family History " "  Problem Relation Age of Onset    Fibromyalgia Mother     Rheum arthritis Father      Allergies   Allergen Reactions    Nuts - Food Allergy Anaphylaxis    Other Anaphylaxis     Tree nuts  Yogurt  Carrots  Limes    Shellfish-Derived Products - Food Allergy Anaphylaxis    Latex Itching    Peanut Oil - Food Allergy     Penicillins      Family allergy           Physical Exam  /86   Pulse 75   Ht 5' 9\" (1.753 m)   Wt 95.3 kg (210 lb)   BMI 31.01 kg/m²     Constitutional:  see vital signs  Gen: well-developed, normocephalic/atraumatic, well-groomed  Eyes: No inflammation or discharge of conjunctiva or lids; sclera clear   Pulmonary/Chest: Effort normal. No respiratory distress.     Right Hip Exam     Tenderness   The patient is experiencing tenderness in the greater trochanter.    Range of Motion   Extension:  -30   Flexion:  70   External rotation:  70   Internal rotation:  30     Muscle Strength   Right hip normal muscle strength: resisted flexion aggravates lateral hip pain.  Abduction: 5/5   Adduction: 5/5   Flexion: 5/5     Tests   JANE: negative    Other   Erythema: absent    Comments:  FADIR: negative - but aggravate  Resisted hip ER: 5/5 and aggravates lateral hip pain    Straight leg raise: Negative                  Procedures        "

## 2024-07-31 NOTE — PROGRESS NOTES
"Daily Note     Today's date: 2024  Patient name: Darnell Gottlieb  : 2000  MRN: [id number]  Referring provider: Keon Bal MD  Dx:   Encounter Diagnosis     ICD-10-CM    1. Right ankle gives way  M25.371       2. Greater trochanteric bursitis of right hip  M70.61       3. Transient weakness of right lower extremity  R29.898       4. Greater trochanteric pain syndrome of right lower extremity  M25.551       5. Chronic right hip pain  M25.551     G89.29                      Subjective:       Objective: See treatment diary below      Assessment:   Plan: Progress treatment as tolerated.       Precautions: R trochanteric bursitis, R ankle weakness        Manuals 7/3/24 7/9 7/11 7/15 7/22 7/24 7/29 8/1     Foam roller massage to R hip/IT band CRR  LQ CRR  R IT S/Lband and LS in prone CRR CRR foam roller massage R CRR  Foam roller R hip      Passive stretch IT band CRR  LQ CRR -                           FOTO/RE       Neuro Re-Ed             Instruction in HEP Written HEP bridge, clamshell, knee to chest, LTR, supine IT band stretch  Remind patient importance of HEP                                                                                        Ther Ex             K to C 5x  hold 5\" l/R  10x5\" 5x hold 5\" 5x hold 5\" X hold 5\" 5x L/R hold 5\"      LTR 5x L/R   5x hold 5\" 5x hold 5\" 5x hold 5\" 5x hold 5\"            Supine IT band stretch with strap 3 x 20\"  3 x 20\" S/L rot stretch/R IT band    3 x 20\" 3 x 20\"    L/R Stand IT band    2 x 20\" L/R -      bridge 10x  10x   10x    Green TB around knees 10x green TB 20x blue TB 20x blue TB      Bird dog     10x L/R 10x L/R 10x L/R      Hip hinge/ER stretch     3 x 10\" -       clamshell 10x  10x 10x green TB  R 10x green TB 20x L/R    Blue TB 20x/20x L/R blue TB      Seated iso hip abd/add   10x/10x Supine ADD    10x hold 5\" 20x/20x GTB Iso add    20x -      Bird dog      10x 10x      Sit to stand   10x 10x 10x 10x -      PPT    5x hold 5\" 5x hold 5\" 5x hold 5\" " "5x old 5\"      Ther Activity             NUSTEP NV  5 min LVL 5 8 min  L5 10' L5 10' L5 Ellyptical  L2  8 min      Hip hike     10x L/R 20x L/R 20x L/R      Leg press      B  135 2 x 20    R 2 x 20 75# B 135#  2 x 20    Uni L/R 20x 85#      Gait Training             Wide walk, back, march, side step NV  2 laps at mirror 2 x 30'    all 2 x 30\"  all 2 x 30'    Yellow COB 2 x 30 wide walk/side step 2 x 30'      Step up/down/side     10x L/R  10x all 6'  10x R up/down/side -      Modalities                                                "

## 2024-08-01 ENCOUNTER — APPOINTMENT (OUTPATIENT)
Age: 24
End: 2024-08-01
Payer: COMMERCIAL

## 2024-08-01 ENCOUNTER — OFFICE VISIT (OUTPATIENT)
Age: 24
End: 2024-08-01
Payer: COMMERCIAL

## 2024-08-01 VITALS
HEIGHT: 69 IN | SYSTOLIC BLOOD PRESSURE: 114 MMHG | TEMPERATURE: 98.7 F | DIASTOLIC BLOOD PRESSURE: 82 MMHG | BODY MASS INDEX: 30.07 KG/M2 | HEART RATE: 106 BPM | RESPIRATION RATE: 18 BRPM | WEIGHT: 203.04 LBS | OXYGEN SATURATION: 97 %

## 2024-08-01 DIAGNOSIS — A08.4 VIRAL GASTROENTERITIS: Primary | ICD-10-CM

## 2024-08-01 DIAGNOSIS — H67.2 VIRAL EAR INFECTION, LEFT: ICD-10-CM

## 2024-08-01 DIAGNOSIS — B34.9 VIRAL EAR INFECTION, LEFT: ICD-10-CM

## 2024-08-01 PROCEDURE — 99213 OFFICE O/P EST LOW 20 MIN: CPT

## 2024-08-01 NOTE — PROGRESS NOTES
[facility]        NAME: Darnell Gottlibe is a 23 y.o. male  : 2000    MRN: [id number]  DATE: 2024  TIME: 9:04 AM    Assessment and Plan   Viral gastroenteritis [A08.4]  1. Viral gastroenteritis        2. Viral ear infection, left              Patient Instructions   Diarrhea and Vomiting are ways that your body tries to get rid of substances that it does not want sitting in you.  Allow for the diarrhea to continue to get rid of what your body does not want in there.  Slowly work your way to more complex and regular foods  Most ear infections are causes by viruses- as I suspect you are have a current viral GI issue, I believe it can be the same virus.  A fever is normal when you have any kind of infection - can continue taking OTC Tylenol for pain or fever.    Follow up with Primary Care Provider in 3-5 days if not improving.  Proceed to Emergency Department if symptoms worsen.    If tests have been performed at [facility], our office will contact you with results if changes need to be made to the care plan discussed with you at the visit.  You can review your full results on [facility].    Chief Complaint     Chief Complaint   Patient presents with   • Earache     Since this morning. Every time he smoked, it hurt.    • GI Problem     Two days of stomach pains. Vomited yesterday while having cramping, diarrhea x2 days. Able to keep fluids down. Unable to eat. Had a fever 102F and took tylenol.          History of Present Illness       Generalized abdominal pain that comes and goes as well as vomiting x 1 and multiple bouts of diarrhea in the last 2 days.  Has been able to tolerate fluids and has been eating small amounts of crackers and has been tolerating that.  This morning he woke up with pain in his left ear and states that it hurts more when he smokes or when he swallows.  He also reports that he had a fever of 102 and took Tylenol which made it better.  No sick contacts at home and  no one else at home with the same symptoms.        Review of Systems   Review of Systems   Constitutional:  Negative for chills and fever.   HENT:  Positive for ear pain. Negative for sore throat.    Eyes:  Negative for pain and visual disturbance.   Respiratory:  Negative for cough and shortness of breath.    Cardiovascular:  Negative for chest pain and palpitations.   Gastrointestinal:  Positive for abdominal pain, diarrhea and vomiting.   Genitourinary:  Negative for dysuria and hematuria.   Musculoskeletal:  Negative for arthralgias and back pain.   Skin:  Negative for color change and rash.   Neurological:  Negative for dizziness, seizures, syncope, weakness and light-headedness.   All other systems reviewed and are negative.        Current Medications       Current Outpatient Medications:   •  albuterol (2.5 mg/3 mL) 0.083 % nebulizer solution, Take 3 mL (2.5 mg total) by nebulization every 6 (six) hours as needed for wheezing or shortness of breath, Disp: 180 mL, Rfl: 5  •  albuterol (ACCUNEB) 1.25 MG/3ML nebulizer solution, Inhale 1 ampule every 6 (six) hours as needed, Disp: , Rfl:   •  albuterol (Ventolin HFA) 90 mcg/act inhaler, Inhale 2 puffs every 6 (six) hours as needed for wheezing, Disp: 6.7 g, Rfl: 5  •  escitalopram (LEXAPRO) 5 mg tablet, Take 1 tablet (5 mg total) by mouth daily, Disp: 90 tablet, Rfl: 1  •  omeprazole (PriLOSEC) 40 MG capsule, Take 1 capsule (40 mg total) by mouth every evening, Disp: 90 capsule, Rfl: 3  •  sildenafil (VIAGRA) 50 MG tablet, Take 1 tablet (50 mg total) by mouth daily as needed for erectile dysfunction, Disp: 10 tablet, Rfl: 0  •  valsartan (DIOVAN) 80 mg tablet, Take 1 tablet (80 mg total) by mouth daily, Disp: 90 tablet, Rfl: 3  •  amphetamine-dextroamphetamine (ADDERALL XR, 10MG,) 10 MG 24 hr capsule, Take 1 capsule (10 mg total) by mouth every morning Max Daily Amount: 10 mg (Patient not taking: Reported on 7/3/2024), Disp: 30 capsule, Rfl: 0  •  EPINEPHrine  "(EPIPEN) 0.3 mg/0.3 mL SOAJ, Inject 0.3 mL (0.3 mg total) into a muscle once for 1 dose, Disp: 0.6 mL, Rfl: 0  •  ofloxacin (FLOXIN) 0.3 % otic solution, Administer 10 drops into the left ear daily (Patient not taking: Reported on 6/20/2024), Disp: 5 mL, Rfl: 0  •  triamcinolone (KENALOG) 0.1 % cream, Apply topically 2 (two) times a day (Patient not taking: Reported on 6/20/2024), Disp: 30 g, Rfl: 0  •  zinc gluconate 50 mg tablet, Take 1 tablet (50 mg total) by mouth daily as needed (colds) (Patient not taking: Reported on 7/3/2024), Disp: 90 tablet, Rfl: 0    Current Allergies     Allergies as of 08/01/2024 - Reviewed 08/01/2024   Allergen Reaction Noted   • Nuts - food allergy Anaphylaxis    • Other Anaphylaxis 08/06/2017   • Shellfish-derived products - food allergy Anaphylaxis 08/06/2017   • Latex Itching 10/03/2022   • Peanut oil - food allergy  05/03/2017   • Penicillins  10/30/2020            The following portions of the patient's history were reviewed and updated as appropriate: allergies, current medications, past family history, past medical history, past social history, past surgical history and problem list.     Past Medical History:   Diagnosis Date   • Allergic    • Anxiety    • Arthritis    • Asthma    • Asthma due to seasonal allergies    • GERD (gastroesophageal reflux disease)    • Headache(784.0)    • HL (hearing loss)    • Hypertension    • Memory loss        Past Surgical History:   Procedure Laterality Date   • COLONOSCOPY     • NO PAST SURGERIES     • UPPER GASTROINTESTINAL ENDOSCOPY     • WISDOM TOOTH EXTRACTION         Family History   Problem Relation Age of Onset   • Fibromyalgia Mother    • Rheum arthritis Father          Medications have been verified.        Objective   /82   Pulse (!) 106   Temp 98.7 °F (37.1 °C)   Resp 18   Ht 5' 9\" (1.753 m)   Wt 92.1 kg (203 lb 0.7 oz)   SpO2 97%   BMI 29.98 kg/m²   No LMP for male patient.       Physical Exam     Physical " Exam  Vitals and nursing note reviewed.   Constitutional:       Appearance: Normal appearance.   HENT:      Head: Normocephalic and atraumatic.      Right Ear: Tympanic membrane normal.      Left Ear: No drainage or tenderness. Tympanic membrane is erythematous (50%). Tympanic membrane is not perforated.   Pulmonary:      Effort: Pulmonary effort is normal.   Skin:     General: Skin is warm and dry.      Capillary Refill: Capillary refill takes less than 2 seconds.   Neurological:      General: No focal deficit present.      Mental Status: He is alert and oriented to person, place, and time. Mental status is at baseline.      Sensory: No sensory deficit.      Motor: No weakness.   Psychiatric:         Mood and Affect: Mood normal.         Behavior: Behavior normal.         Thought Content: Thought content normal.

## 2024-08-01 NOTE — PATIENT INSTRUCTIONS
Diarrhea and Vomiting are ways that your body tries to get rid of substances that it does not want sitting in you.  Allow for the diarrhea to continue to get rid of what your body does not want in there.  Slowly work your way to more complex and regular foods  Most ear infections are causes by viruses- as I suspect you are have a current viral GI issue, I believe it can be the same virus.  A fever is normal when you have any kind of infection - can continue taking OTC Tylenol for pain or fever.    Follow up with Primary Care Provider in 3-5 days if not improving.  Proceed to Emergency Department if symptoms worsen.    If tests have been performed at [facility], our office will contact you with results if changes need to be made to the care plan discussed with you at the visit.  You can review your full results on [facility] MyChart.

## 2024-08-01 NOTE — PROGRESS NOTES
"Daily Note     Today's date: 2024  Patient name: Darnell Gottlieb  : 2000  MRN: [id number]  Referring provider: Keon Bal MD  Dx:   No diagnosis found.                 Subjective:       Objective: See treatment diary below      Assessment:   Plan: Progress treatment as tolerated.       Precautions: R trochanteric bursitis, R ankle weakness        Manuals 7/3/24 7/9 7/11 7/15 7/22 7/24 7/29 8/1     Foam roller massage to R hip/IT band CRR  LQ CRR  R IT S/Lband and LS in prone CRR CRR foam roller massage R CRR  Foam roller R hip      Passive stretch IT band CRR  LQ CRR -                           FOTO/RE       Neuro Re-Ed             Instruction in HEP Written HEP bridge, clamshell, knee to chest, LTR, supine IT band stretch  Remind patient importance of HEP                                                                                        Ther Ex             K to C 5x  hold 5\" l/R  10x5\" 5x hold 5\" 5x hold 5\" X hold 5\" 5x L/R hold 5\"      LTR 5x L/R   5x hold 5\" 5x hold 5\" 5x hold 5\" 5x hold 5\"            Supine IT band stretch with strap 3 x 20\"  3 x 20\" S/L rot stretch/R IT band    3 x 20\" 3 x 20\"    L/R Stand IT band    2 x 20\" L/R -      bridge 10x  10x   10x    Green TB around knees 10x green TB 20x blue TB 20x blue TB      Bird dog     10x L/R 10x L/R 10x L/R      Hip hinge/ER stretch     3 x 10\" -       clamshell 10x  10x 10x green TB  R 10x green TB 20x L/R    Blue TB 20x/20x L/R blue TB      Seated iso hip abd/add   10x/10x Supine ADD    10x hold 5\" 20x/20x GTB Iso add    20x -      Bird dog      10x 10x      Sit to stand   10x 10x 10x 10x -      PPT    5x hold 5\" 5x hold 5\" 5x hold 5\" 5x old 5\"      Ther Activity             NUSTEP NV  5 min LVL 5 8 min  L5 10' L5 10' L5 Ellyptical  L2  8 min      Hip hike     10x L/R 20x L/R 20x L/R      Leg press      B  135 2 x 20    R 2 x 20 75# B 135#  2 x 20    Uni L/R 20x 85#      Gait Training             Wide walk, back, march, side step NV  2 " "laps at mirror 2 x 30'    all 2 x 30\"  all 2 x 30'    Yellow COB 2 x 30 wide walk/side step 2 x 30'      Step up/down/side     10x L/R  10x all 6'  10x R up/down/side -      Modalities                                                "

## 2024-08-05 ENCOUNTER — APPOINTMENT (OUTPATIENT)
Age: 24
End: 2024-08-05
Payer: COMMERCIAL

## 2024-08-05 NOTE — PROGRESS NOTES
"Daily Note     Today's date: 2024  Patient name: Darnell oGttlieb  : 2000  MRN: [id number]  Referring provider: Keon Bal MD  Dx:   Encounter Diagnosis     ICD-10-CM    1. Right ankle gives way  M25.371       2. Greater trochanteric bursitis of right hip  M70.61       3. Transient weakness of right lower extremity  R29.898       4. Greater trochanteric pain syndrome of right lower extremity  M25.551       5. Chronic right hip pain  M25.551     G89.29                      Subjective:       Objective: See treatment diary below      Assessment: . Patient performed {KUAerobic:66720} aerobic exercise to increase blood flow to the area being treated, prepare the muscles for strength training and stretching, improve overall tolerance to activity, and aerobic endurance.     Plan: Progress treatment as tolerated.       Precautions: R trochanteric bursitis, R ankle weakness        Manuals 7/3/24 7/9 7/11 7/15 7/22 7/24 7/29 8/7     Foam roller massage to R hip/IT band CRR  LQ CRR  R IT S/Lband and LS in prone CRR CRR foam roller massage R CRR  Foam roller R hip      Passive stretch IT band CRR  LQ CRR -                           FOTO/RE       Neuro Re-Ed             Instruction in HEP Written HEP bridge, clamshell, knee to chest, LTR, supine IT band stretch  Remind patient importance of HEP                                                                                        Ther Ex             K to C 5x  hold 5\" l/R  10x5\" 5x hold 5\" 5x hold 5\" X hold 5\" 5x L/R hold 5\"      LTR 5x L/R   5x hold 5\" 5x hold 5\" 5x hold 5\" 5x hold 5\"            Supine IT band stretch with strap 3 x 20\"  3 x 20\" S/L rot stretch/R IT band    3 x 20\" 3 x 20\"    L/R Stand IT band    2 x 20\" L/R -      bridge 10x  10x   10x    Green TB around knees 10x green TB 20x blue TB 20x blue TB      Bird dog     10x L/R 10x L/R 10x L/R      Hip hinge/ER stretch     3 x 10\" -       clamshell 10x  10x 10x green TB  R 10x green TB 20x L/R    Blue " "TB 20x/20x L/R blue TB      Seated iso hip abd/add   10x/10x Supine ADD    10x hold 5\" 20x/20x GTB Iso add    20x -      Bird dog      10x 10x      Sit to stand   10x 10x 10x 10x -      PPT    5x hold 5\" 5x hold 5\" 5x hold 5\" 5x old 5\"      Ther Activity             NUSTEP NV  5 min LVL 5 8 min  L5 10' L5 10' L5 Ellyptical  L2  8 min      Hip hike     10x L/R 20x L/R 20x L/R      Leg press      B  135 2 x 20    R 2 x 20 75# B 135#  2 x 20    Uni L/R 20x 85#      Gait Training             Wide walk, back, march, side step NV  2 laps at mirror 2 x 30'    all 2 x 30\"  all 2 x 30'    Yellow COB 2 x 30 wide walk/side step 2 x 30'      Step up/down/side     10x L/R  10x all 6'  10x R up/down/side -      Modalities                                                  "

## 2024-08-07 ENCOUNTER — APPOINTMENT (OUTPATIENT)
Age: 24
End: 2024-08-07
Payer: COMMERCIAL

## 2024-08-08 ENCOUNTER — OFFICE VISIT (OUTPATIENT)
Age: 24
End: 2024-08-08
Payer: COMMERCIAL

## 2024-08-08 DIAGNOSIS — M25.551 GREATER TROCHANTERIC PAIN SYNDROME OF RIGHT LOWER EXTREMITY: ICD-10-CM

## 2024-08-08 DIAGNOSIS — G89.29 CHRONIC RIGHT HIP PAIN: ICD-10-CM

## 2024-08-08 DIAGNOSIS — M70.61 GREATER TROCHANTERIC BURSITIS OF RIGHT HIP: Primary | ICD-10-CM

## 2024-08-08 DIAGNOSIS — M25.551 CHRONIC RIGHT HIP PAIN: ICD-10-CM

## 2024-08-08 DIAGNOSIS — R29.898 TRANSIENT WEAKNESS OF RIGHT LOWER EXTREMITY: ICD-10-CM

## 2024-08-08 PROCEDURE — 97530 THERAPEUTIC ACTIVITIES: CPT | Performed by: PHYSICAL THERAPIST

## 2024-08-08 PROCEDURE — 97140 MANUAL THERAPY 1/> REGIONS: CPT | Performed by: PHYSICAL THERAPIST

## 2024-08-08 PROCEDURE — 97110 THERAPEUTIC EXERCISES: CPT | Performed by: PHYSICAL THERAPIST

## 2024-08-08 NOTE — PROGRESS NOTES
"Daily Note     Today's date: 2024  Patient name: Darnell Gottlieb  : 2000  MRN: [id number]  Referring provider: Keon Bal MD  Dx:   Encounter Diagnosis     ICD-10-CM    1. Greater trochanteric bursitis of right hip  M70.61       2. Transient weakness of right lower extremity  R29.898       3. Greater trochanteric pain syndrome of right lower extremity  M25.551       4. Chronic right hip pain  M25.551     G89.29           Start Time: 1530  Stop Time: 1615  Total time in clinic (min): 45 minutes    Subjective: Pt reporting pain free today, however as high as 7/10 in the morning.      Objective: See treatment diary below      Assessment: . Patient performed X-ride  aerobic exercise to increase blood flow to the area being treated, prepare the muscles for strength training and stretching, improve overall tolerance to activity, and aerobic endurance. Progressing with B LE strengthening, endurance and lumbar stabilization program. No pain with exercise, some fatigue with plyometric strengthening.  Plan to continue PT with goal of resuming all activity with minimal discomfort.  Plan: Progress treatment as tolerated.       Precautions: R trochanteric bursitis, R ankle weakness        Manuals 7/3/24 7/9 7/11 7/15 7/22 7/24 7/29 8/7     Foam roller massage to R hip/IT band CRR  LQ CRR  R IT S/Lband and LS in prone CRR CRR foam roller massage R CRR  Foam roller R hip CRR  Foam roller L/R     Passive stretch IT band CRR  LQ CRR -                           FOTO/RE       Neuro Re-Ed             Instruction in HEP Written HEP bridge, clamshell, knee to chest, LTR, supine IT band stretch  Remind patient importance of HEP                                                                                        Ther Ex             K to C 5x  hold 5\" l/R  10x5\" 5x hold 5\" 5x hold 5\" X hold 5\" 5x L/R hold 5\" 5x hold 5\"  L/R     LTR 5x L/R   5x hold 5\" 5x hold 5\" 5x hold 5\" 5x hold 5\"       5x hold 5\"  L/R     Supine IT " "band stretch with strap 3 x 20\"  3 x 20\" S/L rot stretch/R IT band    3 x 20\" 3 x 20\"    L/R Stand IT band    2 x 20\" L/R - 2 x 20\" l/R     bridge 10x  10x   10x    Green TB around knees 10x green TB 20x blue TB 20x blue TB 20x blue TB     Bird dog     10x L/R 10x L/R 10x L/R 10x L/R     Hip hinge/ER stretch     3 x 10\" -       clamshell 10x  10x 10x green TB  R 10x green TB 20x L/R    Blue TB 20x/20x L/R blue TB 20x     Seated iso hip abd/add   10x/10x Supine ADD    10x hold 5\" 20x/20x GTB Iso add    20x -      Bird dog      10x 10x 10x     Sit to stand   10x 10x 10x 10x - 10x     PPT    5x hold 5\" 5x hold 5\" 5x hold 5\" 5x hold 5\" 5x hold 5\"     Ther Activity             NUSTEP NV  5 min LVL 5 8 min  L5 10' L5 10' L5 Ellyptical  L2  8 min X-ride  L1  Seat 8     Hip hike     10x L/R 20x L/R 20x L/R 20x L/R     Leg press      B  135 2 x 20    R 2 x 20 75# B 135#  2 x 20    Uni L/R 20x 85# B   135#  2 x 20    Uni L/R  20x  85#     Gait Training             Wide walk, back, march, side step NV  2 laps at mirror 2 x 30'    all 2 x 30\"  all 2 x 30'    Yellow COB 2 x 30 wide walk/side step 2 x 30' 2 x 30' wide walk, sidestep yellow COB     Step up/down/side     10x L/R  10x all 6'  10x R up/down/side - 6'  10x up/down/side L/R     Modalities                                                    "

## 2024-10-21 ENCOUNTER — OFFICE VISIT (OUTPATIENT)
Age: 24
End: 2024-10-21
Payer: COMMERCIAL

## 2024-10-21 VITALS
DIASTOLIC BLOOD PRESSURE: 80 MMHG | HEIGHT: 69 IN | SYSTOLIC BLOOD PRESSURE: 132 MMHG | WEIGHT: 219.8 LBS | RESPIRATION RATE: 18 BRPM | BODY MASS INDEX: 32.56 KG/M2 | OXYGEN SATURATION: 96 % | HEART RATE: 74 BPM | TEMPERATURE: 97 F

## 2024-10-21 DIAGNOSIS — H10.31 ACUTE CONJUNCTIVITIS OF RIGHT EYE, UNSPECIFIED ACUTE CONJUNCTIVITIS TYPE: Primary | ICD-10-CM

## 2024-10-21 PROCEDURE — 99213 OFFICE O/P EST LOW 20 MIN: CPT | Performed by: EMERGENCY MEDICINE

## 2024-10-21 RX ORDER — POLYMYXIN B SULFATE AND TRIMETHOPRIM 1; 10000 MG/ML; [USP'U]/ML
1 SOLUTION OPHTHALMIC EVERY 6 HOURS
Qty: 10 ML | Refills: 0 | Status: SHIPPED | OUTPATIENT
Start: 2024-10-21

## 2024-10-21 NOTE — PATIENT INSTRUCTIONS
"You have been diagnosed with conjunctivitis, which may be viral, bacterial, allergic or chemical and there is no test available in an urgent care setting can be used to tell the difference.  Statistically 95% of the cases are viral.  The viruses that cause conjunctivitis often are the same viruses that cause viral upper respiratory infections. Although your conjunctivitis today is likely viral we do give antibiotic drops as a precaution.  Avoid Visine or any similar vasoconstrictor drops as this will interfere with your body's ability to fight this infection.  Use only the antibiotic drops as prescribed but you may also use cool compresses to the eyelids or an eye cup with cold saline to reduce redness or swelling. You may also take an anti-inflammatory like Advil or Aleve for the redness and swelling.    Patient Education     Conjunctivitis (pink eye)   The Basics   Written by the doctors and editors at [facility]   What is pink eye? -- Pink eye is a term people use to describe an infection or irritation of the eye. The medical term for pink eye is \"conjunctivitis.\"  If you have pink eye, your eye (or eyes) might:   Turn pink or red   Weep or ooze a gooey liquid   Become itchy or burn   Get stuck shut, especially when you first wake up  Pink eye can be caused by an infection, allergies, or an unknown irritation.  Can you catch pink eye from someone else? -- Yes. When pink eye is caused by an infection, it can spread easily. Usually, people catch it from touching something that has been in contact with an infected person's eye. It can also be spread when an infected person touches someone else, and then that person touches their eye.  If someone you know has pink eye, avoid touching their pillowcases, towels, or other personal items.  When should I see a doctor or nurse? -- See your doctor or nurse if your eye hurts, or if you still have trouble seeing clearly after blinking. If you do not have these problems, but " "think you might have pink eye, your doctor or nurse might be able to give you advice over the phone.  Can pink eye be treated? -- Most cases of pink eye go away on their own without treatment. But some types of pink eye can be treated.  When pink eye is caused by infection, it is usually caused by a virus, so antibiotics will not help. Still, pink eye caused by a virus can last several days.   Pink eye caused by an infection with bacteria can be treated with antibiotic eye drops, gel, or ointment.   Pink eye caused by other problems can be treated with eye drops normally used to treat allergies. These drops will not cure the pink eye, but they can help with itchiness and irritation.  When using eye drops for infection, do not touch your healthy eye after touching your infected eye. Also, do not touch the bottle or dropper directly onto 1 eye and then use it in the other. These things can cause the infection to spread from 1 eye to the other.  If your eyelids feel swollen, it might also help to hold a cool wet cloth on the area.  What if I wear contact lenses? -- If you wear contact lenses and you have symptoms of pink eye, it is really important to have a doctor look at your eyes. In people who wear contacts, the symptoms of pink eye can be caused by \"corneal abrasion.\" Corneal abrasion is a scratch on the eye and can be a serious problem.  During treatment for eye infections, you might need to stop wearing your contacts for a short time. If your contacts are disposable, throw them away and use new ones. If your contacts are not disposable, you need to carefully clean them. You should also throw away your contact lens case and get a new one.  When can I go back to work or school? -- If you have pink eye caused by an infection, remember that it can spread very easily. The best way to avoid spreading it is to stay away from other people until you no longer have symptoms. If this is not possible, wash your hands often " (figure 1). It's also important to avoid touching your eyes and sharing items that could spread the infection.  Schools and day cares usually have rules about when a child with pink eye can return. If a child has a bacterial infection, they will probably need to stay home until they have gotten antibiotic eye drops or ointment for 24 hours.  Can pink eye be prevented? -- To keep from getting or spreading pink eye caused by an infection:   Wash your hands often with soap and water.   Try not to touch your eyes.   Avoid sharing towels, bedding, or other personal items with a person who has pink eye.  If your pink eye is caused by allergies, it might help to stay inside with the windows shut as much as possible during peak allergy seasons.  What problems should I watch for? -- Call your doctor or nurse if:   You have trouble seeing clearly after blinking.   Your eye is still red or has drainage after 3 days.   You have eye pain that is getting worse.  All topics are updated as new evidence becomes available and our peer review process is complete.  This topic retrieved from [employer] on: Feb 28, 2024.  Topic 85715 Version 20.0  Release: 32.2.4 - C32.58  © 2024 UpToDate, Inc. and/or its affiliates. All rights reserved.  figure 1: How to wash your hands     Wet your hands with clean water, and apply a small amount of soap. Lather and rub hands together for at least 20 seconds. Clean your wrists, palms, backs of your hands, between your fingers, tips of your fingers, thumbs, and under and around your nails. Rinse well, and dry your hands using a clean towel.  Graphic 421884 Version 7.0  Consumer Information Use and Disclaimer   Disclaimer: This generalized information is a limited summary of diagnosis, treatment, and/or medication information. It is not meant to be comprehensive and should be used as a tool to help the user understand and/or assess potential diagnostic and treatment options. It does NOT include all  information about conditions, treatments, medications, side effects, or risks that may apply to a specific patient. It is not intended to be medical advice or a substitute for the medical advice, diagnosis, or treatment of a health care provider based on the health care provider's examination and assessment of a patient's specific and unique circumstances. Patients must speak with a health care provider for complete information about their health, medical questions, and treatment options, including any risks or benefits regarding use of medications. This information does not endorse any treatments or medications as safe, effective, or approved for treating a specific patient. UpToDate, Inc. and its affiliates disclaim any warranty or liability relating to this information or the use thereof.The use of this information is governed by the Terms of Use, available at https://www.[employer].com/en/know/clinical-effectiveness-terms. 2024© UpToDate, Inc. and its affiliates and/or licensors. All rights reserved.  Copyright   © 2024 UpToDate, Inc. and/or its affiliates. All rights reserved.

## 2024-10-21 NOTE — PROGRESS NOTES
"[facility]        NAME: Darnell Gottlieb is a 23 y.o. male  : 2000    MRN: [id number]  DATE: 2024  TIME: 11:49 AM    Assessment and Plan   Acute conjunctivitis of right eye, unspecified acute conjunctivitis type [H10.31]  1. Acute conjunctivitis of right eye, unspecified acute conjunctivitis type  polymyxin b-trimethoprim (POLYTRIM) ophthalmic solution            Patient Instructions     Patient Instructions   You have been diagnosed with conjunctivitis, which may be viral, bacterial, allergic or chemical and there is no test available in an urgent care setting can be used to tell the difference.  Statistically 95% of the cases are viral.  The viruses that cause conjunctivitis often are the same viruses that cause viral upper respiratory infections. Although your conjunctivitis today is likely viral we do give antibiotic drops as a precaution.  Avoid Visine or any similar vasoconstrictor drops as this will interfere with your body's ability to fight this infection.  Use only the antibiotic drops as prescribed but you may also use cool compresses to the eyelids or an eye cup with cold saline to reduce redness or swelling. You may also take an anti-inflammatory like Advil or Aleve for the redness and swelling.    Patient Education     Conjunctivitis (pink eye)   The Basics   Written by the doctors and editors at [facility]   What is pink eye? -- Pink eye is a term people use to describe an infection or irritation of the eye. The medical term for pink eye is \"conjunctivitis.\"  If you have pink eye, your eye (or eyes) might:   Turn pink or red   Weep or ooze a gooey liquid   Become itchy or burn   Get stuck shut, especially when you first wake up  Pink eye can be caused by an infection, allergies, or an unknown irritation.  Can you catch pink eye from someone else? -- Yes. When pink eye is caused by an infection, it can spread easily. Usually, people catch it from touching something that has been " "in contact with an infected person's eye. It can also be spread when an infected person touches someone else, and then that person touches their eye.  If someone you know has pink eye, avoid touching their pillowcases, towels, or other personal items.  When should I see a doctor or nurse? -- See your doctor or nurse if your eye hurts, or if you still have trouble seeing clearly after blinking. If you do not have these problems, but think you might have pink eye, your doctor or nurse might be able to give you advice over the phone.  Can pink eye be treated? -- Most cases of pink eye go away on their own without treatment. But some types of pink eye can be treated.  When pink eye is caused by infection, it is usually caused by a virus, so antibiotics will not help. Still, pink eye caused by a virus can last several days.   Pink eye caused by an infection with bacteria can be treated with antibiotic eye drops, gel, or ointment.   Pink eye caused by other problems can be treated with eye drops normally used to treat allergies. These drops will not cure the pink eye, but they can help with itchiness and irritation.  When using eye drops for infection, do not touch your healthy eye after touching your infected eye. Also, do not touch the bottle or dropper directly onto 1 eye and then use it in the other. These things can cause the infection to spread from 1 eye to the other.  If your eyelids feel swollen, it might also help to hold a cool wet cloth on the area.  What if I wear contact lenses? -- If you wear contact lenses and you have symptoms of pink eye, it is really important to have a doctor look at your eyes. In people who wear contacts, the symptoms of pink eye can be caused by \"corneal abrasion.\" Corneal abrasion is a scratch on the eye and can be a serious problem.  During treatment for eye infections, you might need to stop wearing your contacts for a short time. If your contacts are disposable, throw them away " and use new ones. If your contacts are not disposable, you need to carefully clean them. You should also throw away your contact lens case and get a new one.  When can I go back to work or school? -- If you have pink eye caused by an infection, remember that it can spread very easily. The best way to avoid spreading it is to stay away from other people until you no longer have symptoms. If this is not possible, wash your hands often (figure 1). It's also important to avoid touching your eyes and sharing items that could spread the infection.  Schools and day cares usually have rules about when a child with pink eye can return. If a child has a bacterial infection, they will probably need to stay home until they have gotten antibiotic eye drops or ointment for 24 hours.  Can pink eye be prevented? -- To keep from getting or spreading pink eye caused by an infection:   Wash your hands often with soap and water.   Try not to touch your eyes.   Avoid sharing towels, bedding, or other personal items with a person who has pink eye.  If your pink eye is caused by allergies, it might help to stay inside with the windows shut as much as possible during peak allergy seasons.  What problems should I watch for? -- Call your doctor or nurse if:   You have trouble seeing clearly after blinking.   Your eye is still red or has drainage after 3 days.   You have eye pain that is getting worse.  All topics are updated as new evidence becomes available and our peer review process is complete.  This topic retrieved from [employer] on: Feb 28, 2024.  Topic 58006 Version 20.0  Release: 32.2.4 - C32.58  © 2024 UpToDate, Inc. and/or its affiliates. All rights reserved.  figure 1: How to wash your hands     Wet your hands with clean water, and apply a small amount of soap. Lather and rub hands together for at least 20 seconds. Clean your wrists, palms, backs of your hands, between your fingers, tips of your fingers, thumbs, and under and  around your nails. Rinse well, and dry your hands using a clean towel.  Graphic 962619 Version 7.0  Consumer Information Use and Disclaimer   Disclaimer: This generalized information is a limited summary of diagnosis, treatment, and/or medication information. It is not meant to be comprehensive and should be used as a tool to help the user understand and/or assess potential diagnostic and treatment options. It does NOT include all information about conditions, treatments, medications, side effects, or risks that may apply to a specific patient. It is not intended to be medical advice or a substitute for the medical advice, diagnosis, or treatment of a health care provider based on the health care provider's examination and assessment of a patient's specific and unique circumstances. Patients must speak with a health care provider for complete information about their health, medical questions, and treatment options, including any risks or benefits regarding use of medications. This information does not endorse any treatments or medications as safe, effective, or approved for treating a specific patient. UpToDate, Inc. and its affiliates disclaim any warranty or liability relating to this information or the use thereof.The use of this information is governed by the Terms of Use, available at https://www.[employer].com/en/know/clinical-effectiveness-terms. 2024© UpToDate, Inc. and its affiliates and/or licensors. All rights reserved.  Copyright   © 2024 UpToDate, Inc. and/or its affiliates. All rights reserved.      Follow up with PCP in 3-5 days.  Proceed to  ER if symptoms worsen.    Chief Complaint     Chief Complaint   Patient presents with    Conjunctivitis     Woke up this morning with swollen RIGHT eye, presents red with drainage. OTC- none.          History of Present Illness       Patient complains of pink right eye with crusty discharge this morning.    Conjunctivitis   Associated symptoms include eye  discharge, eye pain and eye redness. Pertinent negatives include no fever, no headaches and no rash.       Review of Systems   Review of Systems   Constitutional:  Negative for chills and fever.   Eyes:  Positive for pain, discharge and redness. Negative for visual disturbance.   Skin:  Negative for rash.   Neurological:  Negative for headaches.         Current Medications       Current Outpatient Medications:     albuterol (2.5 mg/3 mL) 0.083 % nebulizer solution, Take 3 mL (2.5 mg total) by nebulization every 6 (six) hours as needed for wheezing or shortness of breath, Disp: 180 mL, Rfl: 5    albuterol (ACCUNEB) 1.25 MG/3ML nebulizer solution, Inhale 1 ampule every 6 (six) hours as needed, Disp: , Rfl:     albuterol (Ventolin HFA) 90 mcg/act inhaler, Inhale 2 puffs every 6 (six) hours as needed for wheezing, Disp: 6.7 g, Rfl: 5    escitalopram (LEXAPRO) 5 mg tablet, Take 1 tablet (5 mg total) by mouth daily, Disp: 90 tablet, Rfl: 1    omeprazole (PriLOSEC) 40 MG capsule, Take 1 capsule (40 mg total) by mouth every evening, Disp: 90 capsule, Rfl: 3    polymyxin b-trimethoprim (POLYTRIM) ophthalmic solution, Administer 1 drop to the right eye every 6 (six) hours, Disp: 10 mL, Rfl: 0    sildenafil (VIAGRA) 50 MG tablet, Take 1 tablet (50 mg total) by mouth daily as needed for erectile dysfunction, Disp: 10 tablet, Rfl: 0    valsartan (DIOVAN) 80 mg tablet, Take 1 tablet (80 mg total) by mouth daily, Disp: 90 tablet, Rfl: 3    amphetamine-dextroamphetamine (ADDERALL XR, 10MG,) 10 MG 24 hr capsule, Take 1 capsule (10 mg total) by mouth every morning Max Daily Amount: 10 mg (Patient not taking: Reported on 7/3/2024), Disp: 30 capsule, Rfl: 0    EPINEPHrine (EPIPEN) 0.3 mg/0.3 mL SOAJ, Inject 0.3 mL (0.3 mg total) into a muscle once for 1 dose, Disp: 0.6 mL, Rfl: 0    ofloxacin (FLOXIN) 0.3 % otic solution, Administer 10 drops into the left ear daily (Patient not taking: Reported on 6/20/2024), Disp: 5 mL, Rfl: 0     "triamcinolone (KENALOG) 0.1 % cream, Apply topically 2 (two) times a day (Patient not taking: Reported on 6/20/2024), Disp: 30 g, Rfl: 0    zinc gluconate 50 mg tablet, Take 1 tablet (50 mg total) by mouth daily as needed (colds) (Patient not taking: Reported on 7/3/2024), Disp: 90 tablet, Rfl: 0    Current Allergies     Allergies as of 10/21/2024 - Reviewed 10/21/2024   Allergen Reaction Noted    Nuts - food allergy Anaphylaxis     Other Anaphylaxis 08/06/2017    Shellfish-derived products - food allergy Anaphylaxis 08/06/2017    Latex Itching 10/03/2022    Peanut oil - food allergy  05/03/2017    Penicillins  10/30/2020            The following portions of the patient's history were reviewed and updated as appropriate: allergies, current medications, past family history, past medical history, past social history, past surgical history and problem list.     Past Medical History:   Diagnosis Date    Allergic     Anxiety     Arthritis     Asthma     Asthma due to seasonal allergies     GERD (gastroesophageal reflux disease)     Headache(784.0)     HL (hearing loss)     Hypertension     Memory loss        Past Surgical History:   Procedure Laterality Date    COLONOSCOPY      NO PAST SURGERIES      UPPER GASTROINTESTINAL ENDOSCOPY      WISDOM TOOTH EXTRACTION         Family History   Problem Relation Age of Onset    Fibromyalgia Mother     Rheum arthritis Father          Medications have been verified.        Objective   /88   Pulse 74   Temp (!) 97 °F (36.1 °C)   Resp 18   Ht 5' 9\" (1.753 m)   Wt 99.7 kg (219 lb 12.8 oz)   SpO2 96%   BMI 32.46 kg/m²        Physical Exam     Physical Exam  Vitals and nursing note reviewed.   Constitutional:       General: He is not in acute distress.     Appearance: He is well-developed. He is not ill-appearing.   Eyes:      Pupils: Pupils are equal, round, and reactive to light.   Cardiovascular:      Rate and Rhythm: Normal rate and regular rhythm.   Skin:     General: " Skin is warm and dry.      Findings: No rash.   Neurological:      Mental Status: He is alert and oriented to person, place, and time.   Psychiatric:         Mood and Affect: Mood normal.         Behavior: Behavior normal.         Thought Content: Thought content normal.         Judgment: Judgment normal.

## 2024-11-18 ENCOUNTER — TELEPHONE (OUTPATIENT)
Dept: FAMILY MEDICINE CLINIC | Facility: CLINIC | Age: 24
End: 2024-11-18

## 2024-11-18 NOTE — TELEPHONE ENCOUNTER
Was speaking with pt but connection may have dropped and he could not hear me. Was calling in regards to scheduling new patient appt for tomorrow 11/19 with Sirisha Wilson, since she will be the new provider going to the [place] location. Pt does have an appt today at 540pm with Dr. Dotson. Would like to change appt for tomorrow.

## 2024-11-23 ENCOUNTER — OFFICE VISIT (OUTPATIENT)
Dept: URGENT CARE | Facility: MEDICAL CENTER | Age: 24
End: 2024-11-23
Payer: COMMERCIAL

## 2024-11-23 VITALS
OXYGEN SATURATION: 96 % | TEMPERATURE: 98.5 F | RESPIRATION RATE: 18 BRPM | DIASTOLIC BLOOD PRESSURE: 67 MMHG | HEART RATE: 80 BPM | SYSTOLIC BLOOD PRESSURE: 126 MMHG

## 2024-11-23 DIAGNOSIS — J20.9 ACUTE BRONCHITIS, UNSPECIFIED ORGANISM: Primary | ICD-10-CM

## 2024-11-23 PROCEDURE — 99213 OFFICE O/P EST LOW 20 MIN: CPT

## 2024-11-23 RX ORDER — AZITHROMYCIN 250 MG/1
TABLET, FILM COATED ORAL
Qty: 6 TABLET | Refills: 0 | Status: SHIPPED | OUTPATIENT
Start: 2024-11-23 | End: 2024-11-27

## 2024-11-23 NOTE — PROGRESS NOTES
[facility]        NAME: Darnell Gottlieb is a 24 y.o. male  : 2000    MRN: [id number]  DATE: 2024  TIME: 4:19 PM    Assessment and Plan   Acute bronchitis, unspecified organism [J20.9]  1. Acute bronchitis, unspecified organism  azithromycin (ZITHROMAX) 250 mg tablet        Presentation concerning for bronchitis.  Covering for bacterial cause due to course of symptoms with azithromycin.  Advised symptomatic treatment.    Patient Instructions     Prescribed course of azithromycin, take as directed.  Symptomatic treatment as below.  Continue with albuterol inhaler as needed.  Fever/Pain: Over the counter Tylenol and/or Motrin as directed on packaging.  Cough: Mucinex can help to thin mucus, making it easier to cough up. Delsym or Robitussin can help to suppress the cough. Utilizing a vaporizer/humidifier may help, as well as increasing fluids.  Sore Throat: Salt water gargles with 1 teaspoon of salt dissolved in 6-8 oz of water, honey, drinking plenty of liquids, eating soft foods. If severe, can utilize OTC chloraseptic spray.  Nasal Congestion: Over the counter allergy medication like Claritin, Allegra or Zyrtec can help with nasal congestion and post nasal drip.  Over the counter saline or steroid nasal sprays like Flonase can help with nasal congestion and post nasal drip as well. Over the counter decongestants such as Sudafed may also help.  Upset Stomach: Drink plenty of fluids. May utilize Gatorade, Pedialyte, or other electrolyte solutions to supplement. Eat bland foods (ex: BRAT - bananas, rice, applesauce, toast) and slowly advance to other foods as tolerated.  Please note, if you have heart issues or high blood pressure, the cough/cold medication of choice is Coricidin. Talk to your doctor before trying any new medications.    Follow up with PCP in 3-5 days.  Proceed to  ER if symptoms worsen.    If tests are performed, our office will contact you with results only if changes  need to made to the care plan discussed with you at the visit. You can review your full results on [facility].    Chief Complaint     Chief Complaint   Patient presents with    Cough     Patient c/o chest congestion with productive cough x 3-4 weeks          History of Present Illness       Patient presents for evaluation of productive cough, nasal congestion. Symptoms present for about the past 3-4 weeks. Symptoms not improving. PMH of respiratory/lung issues includes asthma; bronchitis once a year.    Cough  This is a new problem. The current episode started 1 to 4 weeks ago. The problem has been unchanged. The cough is Productive of sputum. Associated symptoms include chest pain (cramping sensation), ear pain (slight), postnasal drip, rhinorrhea and shortness of breath (here and there). Pertinent negatives include no chills, eye redness, fever, myalgias, rash, sore throat or wheezing. Treatments tried: applie cider vinegar, albuterol inhaler. The treatment provided mild relief.       Review of Systems   Review of Systems   Constitutional:  Negative for appetite change, chills and fever.   HENT:  Positive for congestion, ear pain (slight), postnasal drip and rhinorrhea. Negative for ear discharge, sinus pressure, sinus pain and sore throat.    Eyes:  Negative for pain, discharge, redness and itching.   Respiratory:  Positive for cough (productive - yellow sputum), chest tightness (night time - breathing tighter) and shortness of breath (here and there). Negative for wheezing and stridor.         + chest congestion   Cardiovascular:  Positive for chest pain (cramping sensation).   Gastrointestinal:  Negative for abdominal pain, diarrhea, nausea and vomiting.   Musculoskeletal:  Negative for myalgias.   Skin:  Negative for rash.         Current Medications       Current Outpatient Medications:     albuterol (2.5 mg/3 mL) 0.083 % nebulizer solution, Take 3 mL (2.5 mg total) by nebulization every 6 (six) hours  as needed for wheezing or shortness of breath, Disp: 180 mL, Rfl: 5    albuterol (ACCUNEB) 1.25 MG/3ML nebulizer solution, Inhale 1 ampule every 6 (six) hours as needed, Disp: , Rfl:     albuterol (Ventolin HFA) 90 mcg/act inhaler, Inhale 2 puffs every 6 (six) hours as needed for wheezing, Disp: 6.7 g, Rfl: 5    amphetamine-dextroamphetamine (ADDERALL XR, 10MG,) 10 MG 24 hr capsule, Take 1 capsule (10 mg total) by mouth every morning Max Daily Amount: 10 mg (Patient not taking: Reported on 7/3/2024), Disp: 30 capsule, Rfl: 0    azithromycin (ZITHROMAX) 250 mg tablet, Take 2 tablets today then 1 tablet daily x 4 days, Disp: 6 tablet, Rfl: 0    EPINEPHrine (EPIPEN) 0.3 mg/0.3 mL SOAJ, Inject 0.3 mL (0.3 mg total) into a muscle once for 1 dose, Disp: 0.6 mL, Rfl: 0    escitalopram (LEXAPRO) 5 mg tablet, Take 1 tablet (5 mg total) by mouth daily, Disp: 90 tablet, Rfl: 1    ofloxacin (FLOXIN) 0.3 % otic solution, Administer 10 drops into the left ear daily (Patient not taking: Reported on 6/20/2024), Disp: 5 mL, Rfl: 0    omeprazole (PriLOSEC) 40 MG capsule, Take 1 capsule (40 mg total) by mouth every evening, Disp: 90 capsule, Rfl: 3    polymyxin b-trimethoprim (POLYTRIM) ophthalmic solution, Administer 1 drop to the right eye every 6 (six) hours, Disp: 10 mL, Rfl: 0    sildenafil (VIAGRA) 50 MG tablet, Take 1 tablet (50 mg total) by mouth daily as needed for erectile dysfunction, Disp: 10 tablet, Rfl: 0    triamcinolone (KENALOG) 0.1 % cream, Apply topically 2 (two) times a day (Patient not taking: Reported on 6/20/2024), Disp: 30 g, Rfl: 0    valsartan (DIOVAN) 80 mg tablet, Take 1 tablet (80 mg total) by mouth daily, Disp: 90 tablet, Rfl: 3    zinc gluconate 50 mg tablet, Take 1 tablet (50 mg total) by mouth daily as needed (colds) (Patient not taking: Reported on 7/3/2024), Disp: 90 tablet, Rfl: 0    Current Allergies     Allergies as of 11/23/2024 - Reviewed 11/23/2024   Allergen Reaction Noted    Nuts - food  allergy Anaphylaxis     Other Anaphylaxis 08/06/2017    Shellfish-derived products - food allergy Anaphylaxis 08/06/2017    Latex Itching 10/03/2022    Peanut oil - food allergy  05/03/2017    Penicillins  10/30/2020            The following portions of the patient's history were reviewed and updated as appropriate: allergies, current medications, past family history, past medical history, past social history, past surgical history and problem list.     Past Medical History:   Diagnosis Date    Allergic     Anxiety     Arthritis     Asthma     Asthma due to seasonal allergies     GERD (gastroesophageal reflux disease)     Headache(784.0)     HL (hearing loss)     Hypertension     Memory loss        Past Surgical History:   Procedure Laterality Date    COLONOSCOPY      NO PAST SURGERIES      UPPER GASTROINTESTINAL ENDOSCOPY      WISDOM TOOTH EXTRACTION         Family History   Problem Relation Age of Onset    Fibromyalgia Mother     Rheum arthritis Father          Medications have been verified.        Objective   /67   Pulse 80   Temp 98.5 °F (36.9 °C)   Resp 18   SpO2 96%        Physical Exam     Physical Exam  Vitals and nursing note reviewed.   Constitutional:       General: He is not in acute distress.     Appearance: Normal appearance. He is not ill-appearing.   HENT:      Right Ear: Tympanic membrane, ear canal and external ear normal.      Left Ear: Tympanic membrane, ear canal and external ear normal.      Nose: Congestion and rhinorrhea present.      Mouth/Throat:      Mouth: Mucous membranes are moist.      Pharynx: No oropharyngeal exudate or posterior oropharyngeal erythema.   Eyes:      General:         Right eye: No discharge.         Left eye: No discharge.      Extraocular Movements: Extraocular movements intact.   Cardiovascular:      Rate and Rhythm: Normal rate and regular rhythm.      Pulses: Normal pulses.      Heart sounds: Normal heart sounds.   Pulmonary:      Effort: Pulmonary  effort is normal. No respiratory distress.      Breath sounds: Rhonchi present. No wheezing or rales.   Abdominal:      General: Abdomen is flat. Bowel sounds are normal. There is no distension.      Palpations: Abdomen is soft.      Tenderness: There is no abdominal tenderness. There is no guarding.   Musculoskeletal:      Cervical back: Neck supple.   Lymphadenopathy:      Cervical: No cervical adenopathy.   Skin:     General: Skin is warm and dry.   Neurological:      Mental Status: He is alert.

## 2024-11-23 NOTE — PATIENT INSTRUCTIONS
"Prescribed course of azithromycin, take as directed.  Symptomatic treatment as below.  Continue with albuterol inhaler as needed.  Fever/Pain: Over the counter Tylenol and/or Motrin as directed on packaging.  Cough: Mucinex can help to thin mucus, making it easier to cough up. Delsym or Robitussin can help to suppress the cough. Utilizing a vaporizer/humidifier may help, as well as increasing fluids.  Sore Throat: Salt water gargles with 1 teaspoon of salt dissolved in 6-8 oz of water, honey, drinking plenty of liquids, eating soft foods. If severe, can utilize OTC chloraseptic spray.  Nasal Congestion: Over the counter allergy medication like Claritin, Allegra or Zyrtec can help with nasal congestion and post nasal drip.  Over the counter saline or steroid nasal sprays like Flonase can help with nasal congestion and post nasal drip as well. Over the counter decongestants such as Sudafed may also help.  Upset Stomach: Drink plenty of fluids. May utilize Gatorade, Pedialyte, or other electrolyte solutions to supplement. Eat bland foods (ex: BRAT - bananas, rice, applesauce, toast) and slowly advance to other foods as tolerated.  Please note, if you have heart issues or high blood pressure, the cough/cold medication of choice is Coricidin. Talk to your doctor before trying any new medications.    Follow up with PCP in 3-5 days.  Proceed to  ER if symptoms worsen.    If tests are performed, our office will contact you with results only if changes need to made to the care plan discussed with you at the visit. You can review your full results on [facility] Mychart.    Patient Education     Acute bronchitis in adults   The Basics   Written by the doctors and editors at [facility]   What is bronchitis? -- This is an infection that causes a cough. It happens when the tubes that carry air into the lungs, called the \"bronchi,\" get infected (figure 1).  Usually, bronchitis happens after a person gets a cold or the flu. The " "viruses that cause the cold or flu infect the bronchi and irritate them. Antibiotics do not help bronchitis.  Bronchitis can also happen when a person gets an infection called \"whooping cough,\" but this is much less common. Whooping cough is caused by bacteria that can infect the bronchi. Most people get vaccines to prevent whooping cough, but the vaccine doesn't always work. Your doctor will be able to tell if you have whooping cough by doing an exam and listening to your cough.  This article is about \"acute\" bronchitis. This is different from \"chronic\" bronchitis, which is a lung disease that most often affects people who smoke.  What are the symptoms of bronchitis? -- The most common symptoms are:   A cough that can last up to a few weeks   Coughing up mucus that is clear, yellow, or green - Green mucus does not always mean that you have a bacterial infection.  You might also have other cold or flu symptoms, like a stuffy nose, sore throat, or headache. People with bronchitis do not usually get a fever.  Will I need tests? -- Most people with bronchitis do not need a test. But if your doctor or nurse is not sure what is causing your cough, they might do tests. For example, they might order a chest X-ray. Or if they think that you might have COVID-19, they will test you for the virus that causes the infection.  How is bronchitis treated? -- Bronchitis almost always goes away on its own. But the cough can take up to 3 weeks to get better, and sometimes even longer.  Doctors do not usually treat bronchitis with antibiotic medicines. That's because bronchitis is usually caused by a virus, and antibiotics kill bacteria, not viruses. Also, antibiotics can actually cause other problems.  To feel better, you can treat your cold and flu symptoms. You can:   Get lots of rest, and drink plenty of liquids.   Drink hot tea.   Suck on cough drops or hard candy.   Take over-the-counter cough and cold medicines.   Breath in " "warm, moist air, such as in the shower, over a kettle, or from a humidifier.   Take a pain-relieving medicine if you have cold or flu symptoms like headache, muscle aches, or joint pain.  Avoid smoking or being around others who smoke. This can make your cough worse.  How can I keep from getting bronchitis again? -- You can reduce your chance of getting bronchitis again by keeping the germs that cause bronchitis out of your body. One of the best ways to do this is to wash your hands often with soap and water. If there is no sink nearby, you can use a hand gel with alcohol in it to clean your hands.  How can I keep from spreading germs? -- In addition to washing your hands often, cover your mouth with your elbow when you sneeze or cough. Using your elbow keeps you from getting germs on your hands. If you use a tissue, throw the tissue away and wash your hands.  When should I call the doctor? -- Call for advice if you have:   A fever higher than 100.4°F (38°C), or chills   Chest pain when you cough, trouble breathing, or coughing up blood   A barking cough that makes it hard to talk   Cough and weight loss that you cannot explain   Symptoms that are not getting better after 3 weeks  All topics are updated as new evidence becomes available and our peer review process is complete.  This topic retrieved from [place] on: May 16, 2024.  Topic 96507 Version 17.0  Release: 32.4.3 - C32.135  © 2024 UpToDate, Inc. and/or its affiliates. All rights reserved.  figure 1: Normal lungs     The lungs sit in the chest, inside the ribcage. They are covered with a thin membrane called the \"pleura.\" The windpipe, or trachea, branches into 2 smaller airways called the left and right \"bronchi.\" The space between the lungs is called the \"mediastinum.\" Lymph nodes are located within and around the lungs and mediastinum.  Graphic 57981 Version 14.0  Consumer Information Use and Disclaimer   Disclaimer: This generalized information is a " limited summary of diagnosis, treatment, and/or medication information. It is not meant to be comprehensive and should be used as a tool to help the user understand and/or assess potential diagnostic and treatment options. It does NOT include all information about conditions, treatments, medications, side effects, or risks that may apply to a specific patient. It is not intended to be medical advice or a substitute for the medical advice, diagnosis, or treatment of a health care provider based on the health care provider's examination and assessment of a patient's specific and unique circumstances. Patients must speak with a health care provider for complete information about their health, medical questions, and treatment options, including any risks or benefits regarding use of medications. This information does not endorse any treatments or medications as safe, effective, or approved for treating a specific patient. UpToDate, Inc. and its affiliates disclaim any warranty or liability relating to this information or the use thereof.The use of this information is governed by the Terms of Use, available at https://www.wolterskluwer.com/en/know/clinical-effectiveness-terms. 2024© UpToDate, Inc. and its affiliates and/or licensors. All rights reserved.  Copyright   © 2024 UpToDate, Inc. and/or its affiliates. All rights reserved.

## 2024-12-04 ENCOUNTER — OFFICE VISIT (OUTPATIENT)
Dept: FAMILY MEDICINE CLINIC | Facility: CLINIC | Age: 24
End: 2024-12-04
Payer: COMMERCIAL

## 2024-12-04 VITALS
HEART RATE: 88 BPM | TEMPERATURE: 97 F | DIASTOLIC BLOOD PRESSURE: 82 MMHG | HEIGHT: 69 IN | SYSTOLIC BLOOD PRESSURE: 110 MMHG | WEIGHT: 221.8 LBS | BODY MASS INDEX: 32.85 KG/M2 | RESPIRATION RATE: 16 BRPM | OXYGEN SATURATION: 97 %

## 2024-12-04 DIAGNOSIS — E78.2 MIXED HYPERLIPIDEMIA: ICD-10-CM

## 2024-12-04 DIAGNOSIS — I10 ESSENTIAL HYPERTENSION: ICD-10-CM

## 2024-12-04 DIAGNOSIS — Z00.00 ANNUAL PHYSICAL EXAM: Primary | ICD-10-CM

## 2024-12-04 DIAGNOSIS — Z13.29 SCREENING FOR HYPOTHYROIDISM: ICD-10-CM

## 2024-12-04 DIAGNOSIS — Z13.1 SCREENING FOR DIABETES MELLITUS: ICD-10-CM

## 2024-12-04 DIAGNOSIS — Z13.89 SCREENING FOR BLOOD OR PROTEIN IN URINE: ICD-10-CM

## 2024-12-04 DIAGNOSIS — K21.9 GASTROESOPHAGEAL REFLUX DISEASE WITHOUT ESOPHAGITIS: ICD-10-CM

## 2024-12-04 DIAGNOSIS — F90.2 ATTENTION DEFICIT HYPERACTIVITY DISORDER (ADHD), COMBINED TYPE: ICD-10-CM

## 2024-12-04 PROCEDURE — 99214 OFFICE O/P EST MOD 30 MIN: CPT | Performed by: INTERNAL MEDICINE

## 2024-12-04 PROCEDURE — 99395 PREV VISIT EST AGE 18-39: CPT | Performed by: INTERNAL MEDICINE

## 2024-12-04 RX ORDER — VALSARTAN 80 MG/1
80 TABLET ORAL DAILY
Qty: 90 TABLET | Refills: 3 | Status: SHIPPED | OUTPATIENT
Start: 2024-12-04

## 2024-12-04 RX ORDER — OMEPRAZOLE 40 MG/1
40 CAPSULE, DELAYED RELEASE ORAL EVERY EVENING
Qty: 90 CAPSULE | Refills: 3 | Status: SHIPPED | OUTPATIENT
Start: 2024-12-04

## 2024-12-04 NOTE — PROGRESS NOTES
Assessment/Plan:    Essential hypertension  Blood pressures are very well-controlled.  Continue current dose of valsartan, recheck BMP.    GERD (gastroesophageal reflux disease)  Well-controlled on omeprazole.    Attention deficit disorder  Self assessment consistent with diagnosis of ADHD.  Will run UDS, controlled substance agreement signed.  Side effects of Adderall discussed.  He will follow-up with us in 2 weeks to recheck on his blood pressures.       Diagnoses and all orders for this visit:    Annual physical exam    Gastroesophageal reflux disease without esophagitis  -     omeprazole (PriLOSEC) 40 MG capsule; Take 1 capsule (40 mg total) by mouth every evening    Essential hypertension  -     valsartan (DIOVAN) 80 mg tablet; Take 1 tablet (80 mg total) by mouth daily  -     Basic metabolic panel; Future    Mixed hyperlipidemia  -     Lipid panel; Future  -     Cancel: Vitamin D 25 hydroxy; Future    Screening for hypothyroidism    Screening for blood or protein in urine  -     UA (URINE) with reflex to Scope; Future    Screening for diabetes mellitus  -     TSH, 3rd generation with Free T4 reflex; Future  -     Hemoglobin A1C; Future    Attention deficit hyperactivity disorder (ADHD), combined type  -     [facility] All Prescribed Meds and Special Instructions  -     Amphetamines, Methamphetamines  -     Butalbital  -     Phenobarbital  -     Secobarbital  -     Alprazolam  -     Clonazepam  -     Diazepam  -     Lorazepam  -     Gabapentin  -     Pregabalin  -     Cocaine  -     Heroin  -     Buprenorphine  -     Levorphanol  -     Meperidine  -     Naltrexone  -     Fentanyl  -     Methadone  -     Oxycodone  -     Tapentadol  -     THC  -     Tramadol  -     Codeine, Hydrocodone, Hydropmorphone, Morphine  -     Bath Salts  -     Ethyl Glucuronide/Ethyl Sulfate  -     Kratom  -     Spice  -     Methylphenidate  -     Phentermine  -     Validity Oxidant  -     Validity Creatinine  -     Validity pH  -      "Validity Specific  -     Xylazine Definitive Test          Subjective:      Patient ID: Darnell Gottlieb is a 24 y.o. male.    Patient came today to establish care, follow-up on his chronic problems and for annual checkup.  His vital signs are good except of elevated BMI.  He would like to hold off on vaccinations.  He vapes, he does not drink alcohol.  He does not use street drugs.        Medication Refill  Pertinent negatives include no abdominal pain, arthralgias, chest pain, chills, coughing, fatigue, fever, headaches, joint swelling, myalgias, nausea, neck pain, numbness, rash or weakness.       The following portions of the patient's history were reviewed and updated as appropriate: allergies, current medications, past family history, past medical history, past social history, past surgical history, and problem list.    Review of Systems   Constitutional:  Negative for chills, fatigue and fever.   Respiratory:  Negative for cough, chest tightness and shortness of breath.    Cardiovascular:  Negative for chest pain, palpitations and leg swelling.   Gastrointestinal:  Negative for abdominal distention, abdominal pain, blood in stool, constipation, diarrhea and nausea.   Genitourinary:  Negative for difficulty urinating and dysuria.   Musculoskeletal:  Negative for arthralgias, back pain, gait problem, joint swelling, myalgias and neck pain.   Skin:  Negative for rash.   Neurological:  Negative for dizziness, weakness, numbness and headaches.   Psychiatric/Behavioral:  Negative for agitation.          Objective:      /82 (BP Location: Left arm, Patient Position: Sitting, Cuff Size: Large)   Pulse 88   Temp (!) 97 °F (36.1 °C) (Temporal)   Resp 16   Ht 5' 9\" (1.753 m)   Wt 101 kg (221 lb 12.8 oz)   SpO2 97%   BMI 32.75 kg/m²     Allergies   Allergen Reactions    Nuts - Food Allergy Anaphylaxis    Other Anaphylaxis     Tree nuts  Yogurt  Carrots  Limes    Shellfish-Derived Products - Food Allergy " Anaphylaxis    Latex Itching    Peanut Oil - Food Allergy     Penicillins      Family allergy           Current Outpatient Medications:     albuterol (2.5 mg/3 mL) 0.083 % nebulizer solution, Take 3 mL (2.5 mg total) by nebulization every 6 (six) hours as needed for wheezing or shortness of breath, Disp: 180 mL, Rfl: 5    albuterol (ACCUNEB) 1.25 MG/3ML nebulizer solution, Inhale 1 ampule every 6 (six) hours as needed, Disp: , Rfl:     albuterol (Ventolin HFA) 90 mcg/act inhaler, Inhale 2 puffs every 6 (six) hours as needed for wheezing, Disp: 6.7 g, Rfl: 5    EPINEPHrine (EPIPEN) 0.3 mg/0.3 mL SOAJ, Inject 0.3 mL (0.3 mg total) into a muscle once for 1 dose, Disp: 0.6 mL, Rfl: 0    omeprazole (PriLOSEC) 40 MG capsule, Take 1 capsule (40 mg total) by mouth every evening, Disp: 90 capsule, Rfl: 3    sildenafil (VIAGRA) 50 MG tablet, Take 1 tablet (50 mg total) by mouth daily as needed for erectile dysfunction, Disp: 10 tablet, Rfl: 0    valsartan (DIOVAN) 80 mg tablet, Take 1 tablet (80 mg total) by mouth daily, Disp: 90 tablet, Rfl: 3    amphetamine-dextroamphetamine (ADDERALL XR, 10MG,) 10 MG 24 hr capsule, Take 1 capsule (10 mg total) by mouth every morning Max Daily Amount: 10 mg (Patient not taking: Reported on 12/4/2024), Disp: 30 capsule, Rfl: 0     There are no Patient Instructions on file for this visit.        Physical Exam  Constitutional:       Appearance: He is not ill-appearing.   HENT:      Head: Normocephalic and atraumatic.      Nose: No congestion or rhinorrhea.   Eyes:      General:         Right eye: No discharge.         Left eye: No discharge.      Extraocular Movements: Extraocular movements intact.      Conjunctiva/sclera: Conjunctivae normal.      Pupils: Pupils are equal, round, and reactive to light.   Cardiovascular:      Rate and Rhythm: Normal rate and regular rhythm.      Pulses: Normal pulses.      Heart sounds: Normal heart sounds. No murmur heard.     No friction rub. No gallop.    Pulmonary:      Effort: Pulmonary effort is normal. No respiratory distress.      Breath sounds: Normal breath sounds. No wheezing or rales.   Chest:      Chest wall: No tenderness.   Abdominal:      General: Bowel sounds are normal. There is no distension.      Palpations: Abdomen is soft. There is no mass.      Tenderness: There is no abdominal tenderness. There is no rebound.      Hernia: No hernia is present.   Musculoskeletal:         General: No swelling, tenderness or deformity.      Cervical back: No rigidity. No muscular tenderness.   Skin:     Coloration: Skin is not pale.      Findings: No erythema, lesion or rash.   Neurological:      Mental Status: He is alert and oriented to person, place, and time.      Sensory: No sensory deficit.      Motor: No weakness.   Psychiatric:         Mood and Affect: Mood normal.         Behavior: Behavior normal.

## 2024-12-04 NOTE — ASSESSMENT & PLAN NOTE
Self assessment consistent with diagnosis of ADHD.  Will run UDS, controlled substance agreement signed.  Side effects of Adderall discussed.  He will follow-up with us in 2 weeks to recheck on his blood pressures.

## 2024-12-08 LAB

## 2024-12-11 ENCOUNTER — RESULTS FOLLOW-UP (OUTPATIENT)
Dept: FAMILY MEDICINE CLINIC | Facility: CLINIC | Age: 24
End: 2024-12-11

## 2024-12-11 DIAGNOSIS — F90.2 ATTENTION DEFICIT HYPERACTIVITY DISORDER (ADHD), COMBINED TYPE: Primary | ICD-10-CM

## 2024-12-11 RX ORDER — DEXTROAMPHETAMINE SACCHARATE, AMPHETAMINE ASPARTATE MONOHYDRATE, DEXTROAMPHETAMINE SULFATE AND AMPHETAMINE SULFATE 2.5; 2.5; 2.5; 2.5 MG/1; MG/1; MG/1; MG/1
10 CAPSULE, EXTENDED RELEASE ORAL EVERY MORNING
Qty: 30 CAPSULE | Refills: 0 | Status: SHIPPED | OUTPATIENT
Start: 2024-12-11

## 2024-12-13 ENCOUNTER — TELEPHONE (OUTPATIENT)
Dept: FAMILY MEDICINE CLINIC | Facility: CLINIC | Age: 24
End: 2024-12-13

## 2024-12-13 NOTE — TELEPHONE ENCOUNTER
PA for amphetamine 10mg SUBMITTED to [facility]     via    []CMM-KEY:    []Surescripts-Case ID #  [id number]  []Availity-Auth ID #  NDC #     []Faxed to plan   []Other website    []Phone call Case ID #      []PA sent as URGENT    All office notes, labs and other pertaining documents and studies sent. Clinical questions answered. Awaiting determination from insurance company.     Turnaround time for your insurance to make a decision on your Prior Authorization can take 7-21 business days.

## 2024-12-16 NOTE — TELEPHONE ENCOUNTER
PA for amphetamine 10mg  APPROVED     Date(s) approved  December 13, 2024 to December 13, 2025     Case #[id number]     Patient advised by          [x]Sodrafthart Message  [x]Phone call   []LMOM  []L/M to call office as no active Communication consent on file  [x]Unable to leave detailed message as VM not approved on Communication consent       Pharmacy advised by    []Fax  []Phone call    Approval letter scanned into [employer] No see below

## 2024-12-20 ENCOUNTER — TELEPHONE (OUTPATIENT)
Age: 24
End: 2024-12-20

## 2024-12-20 NOTE — TELEPHONE ENCOUNTER
Pt would like to know if it is safe to drink alcohol while taking Adderall. He would like to know since the holidays are coming and he wants top feel safe drinking moderately.  Pt would also like to know if there is something stronger than Chantix to help him quit smoking. Pt was on it in the past and it did  not help. Please advise

## 2024-12-24 ENCOUNTER — OFFICE VISIT (OUTPATIENT)
Age: 24
End: 2024-12-24
Payer: COMMERCIAL

## 2024-12-24 VITALS
RESPIRATION RATE: 19 BRPM | SYSTOLIC BLOOD PRESSURE: 132 MMHG | BODY MASS INDEX: 32.73 KG/M2 | TEMPERATURE: 97.7 F | DIASTOLIC BLOOD PRESSURE: 70 MMHG | OXYGEN SATURATION: 100 % | HEIGHT: 69 IN | WEIGHT: 221 LBS | HEART RATE: 99 BPM

## 2024-12-24 DIAGNOSIS — J36 TONSIL, ABSCESS: Primary | ICD-10-CM

## 2024-12-24 PROCEDURE — 99213 OFFICE O/P EST LOW 20 MIN: CPT

## 2024-12-24 RX ORDER — CLINDAMYCIN HYDROCHLORIDE 150 MG/1
450 CAPSULE ORAL EVERY 8 HOURS SCHEDULED
Qty: 90 CAPSULE | Refills: 0 | Status: SHIPPED | OUTPATIENT
Start: 2024-12-24 | End: 2025-01-03

## 2024-12-24 NOTE — PATIENT INSTRUCTIONS
There appears to be a mass on the right side of your tonsil which I am concerned for can be an abscess due to other symptoms you are exhibiting.  The infection appears to be staying localized and so we are able to treat with antibiotics to help this resolve.  Take antibiotics as prescribed    Follow up with ENT if not improving.  Proceed to Emergency Department if symptoms worsen.    If tests have been performed at [facility], our office will contact you with results if changes need to be made to the care plan discussed with you at the visit.  You can review your full results on [facility] MyChart.

## 2024-12-24 NOTE — PROGRESS NOTES
[facility]        NAME: Darnell Gottlieb is a 24 y.o. male  : 2000    MRN: [id number]  DATE: 2024  TIME: 1:27 PM    Assessment and Plan   Tonsil, abscess [J36]  1. Tonsil, abscess  clindamycin (CLEOCIN) 150 mg capsule            Patient Instructions   There appears to be a mass on the right side of your tonsil which I am concerned for can be an abscess due to other symptoms you are exhibiting.  The infection appears to be staying localized and so we are able to treat with antibiotics to help this resolve.  Take antibiotics as prescribed    Follow up with ENT if not improving.  Proceed to Emergency Department if symptoms worsen.    If tests have been performed at [facility], our office will contact you with results if changes need to be made to the care plan discussed with you at the visit.  You can review your full results on [facility] MyChart.    Chief Complaint     Chief Complaint   Patient presents with   • Sore Throat     C/o feeling like he has a sore in the back of him throat and pain in right ear started 2 wks ago.          History of Present Illness       Patient reports he feels like he has a sore in the back of his throat on the right side started about 2 weeks ago.  Several days later he started with some right ear pain and several days ago noticed some tenderness around the lymph nodes of both sides of his neck.  No fevers or chills and no nausea vomiting diarrhea.  Does report he noticed some thicker phlegm recently.    Sore Throat   Pertinent negatives include no coughing, shortness of breath or trouble swallowing.       Review of Systems   Review of Systems   Constitutional:  Negative for chills and fever.   HENT:  Positive for sore throat (Feels like there is a sore on the back of his throat). Negative for dental problem and trouble swallowing.    Respiratory:  Negative for cough and shortness of breath.    Cardiovascular:  Negative for chest pain.   Neurological:  Negative  for dizziness, weakness and light-headedness.         Current Medications       Current Outpatient Medications:   •  albuterol (Ventolin HFA) 90 mcg/act inhaler, Inhale 2 puffs every 6 (six) hours as needed for wheezing, Disp: 6.7 g, Rfl: 5  •  amphetamine-dextroamphetamine (ADDERALL XR, 10MG,) 10 MG 24 hr capsule, Take 1 capsule (10 mg total) by mouth every morning Max Daily Amount: 10 mg, Disp: 30 capsule, Rfl: 0  •  clindamycin (CLEOCIN) 150 mg capsule, Take 3 capsules (450 mg total) by mouth every 8 (eight) hours for 10 days, Disp: 90 capsule, Rfl: 0  •  omeprazole (PriLOSEC) 40 MG capsule, Take 1 capsule (40 mg total) by mouth every evening, Disp: 90 capsule, Rfl: 3  •  valsartan (DIOVAN) 80 mg tablet, Take 1 tablet (80 mg total) by mouth daily, Disp: 90 tablet, Rfl: 3  •  albuterol (2.5 mg/3 mL) 0.083 % nebulizer solution, Take 3 mL (2.5 mg total) by nebulization every 6 (six) hours as needed for wheezing or shortness of breath (Patient not taking: Reported on 12/24/2024), Disp: 180 mL, Rfl: 5  •  albuterol (ACCUNEB) 1.25 MG/3ML nebulizer solution, Inhale 1 ampule every 6 (six) hours as needed (Patient not taking: Reported on 12/24/2024), Disp: , Rfl:   •  EPINEPHrine (EPIPEN) 0.3 mg/0.3 mL SOAJ, Inject 0.3 mL (0.3 mg total) into a muscle once for 1 dose, Disp: 0.6 mL, Rfl: 0  •  sildenafil (VIAGRA) 50 MG tablet, Take 1 tablet (50 mg total) by mouth daily as needed for erectile dysfunction, Disp: 10 tablet, Rfl: 0    Current Allergies     Allergies as of 12/24/2024 - Reviewed 12/24/2024   Allergen Reaction Noted   • Nuts - food allergy Anaphylaxis    • Other Anaphylaxis 08/06/2017   • Shellfish-derived products - food allergy Anaphylaxis 08/06/2017   • Latex Itching 10/03/2022   • Peanut oil - food allergy  05/03/2017   • Penicillins  10/30/2020            The following portions of the patient's history were reviewed and updated as appropriate: allergies, current medications, past family history, past medical  "history, past social history, past surgical history and problem list.     Past Medical History:   Diagnosis Date   • Allergic    • Anxiety    • Arthritis    • Asthma    • Asthma due to seasonal allergies    • GERD (gastroesophageal reflux disease)    • Headache(784.0)    • HL (hearing loss)    • Hypertension    • Memory loss        Past Surgical History:   Procedure Laterality Date   • COLONOSCOPY     • NO PAST SURGERIES     • UPPER GASTROINTESTINAL ENDOSCOPY     • WISDOM TOOTH EXTRACTION         Family History   Problem Relation Age of Onset   • Fibromyalgia Mother    • Rheum arthritis Father          Medications have been verified.        Objective   /70 (BP Location: Right arm, Patient Position: Sitting)   Pulse 99   Temp 97.7 °F (36.5 °C)   Resp 19   Ht 5' 9\" (1.753 m)   Wt 100 kg (221 lb)   SpO2 100%   BMI 32.64 kg/m²   No LMP for male patient.       Physical Exam     Physical Exam  Vitals and nursing note reviewed.   Constitutional:       Appearance: Normal appearance.   HENT:      Head: Normocephalic and atraumatic.      Mouth/Throat:     Pulmonary:      Effort: Pulmonary effort is normal.   Skin:     General: Skin is warm and dry.      Capillary Refill: Capillary refill takes less than 2 seconds.   Neurological:      General: No focal deficit present.      Mental Status: He is alert and oriented to person, place, and time. Mental status is at baseline.      Sensory: No sensory deficit.      Motor: No weakness.   Psychiatric:         Mood and Affect: Mood normal.         Behavior: Behavior normal.         Thought Content: Thought content normal.                   "

## 2025-01-01 ENCOUNTER — HOSPITAL ENCOUNTER (EMERGENCY)
Facility: HOSPITAL | Age: 25
Discharge: HOME | End: 2025-01-01
Attending: EMERGENCY MEDICINE | Admitting: EMERGENCY MEDICINE
Payer: COMMERCIAL

## 2025-01-01 VITALS
TEMPERATURE: 98.1 F | RESPIRATION RATE: 20 BRPM | HEART RATE: 97 BPM | SYSTOLIC BLOOD PRESSURE: 117 MMHG | DIASTOLIC BLOOD PRESSURE: 72 MMHG | OXYGEN SATURATION: 97 %

## 2025-01-01 DIAGNOSIS — R19.7 DIARRHEA: ICD-10-CM

## 2025-01-01 DIAGNOSIS — R11.2 NAUSEA & VOMITING: ICD-10-CM

## 2025-01-01 DIAGNOSIS — B34.9 VIRAL SYNDROME: Primary | ICD-10-CM

## 2025-01-01 PROCEDURE — 99284 EMERGENCY DEPT VISIT MOD MDM: CPT | Performed by: EMERGENCY MEDICINE

## 2025-01-01 PROCEDURE — 99282 EMERGENCY DEPT VISIT SF MDM: CPT

## 2025-01-01 RX ORDER — ONDANSETRON 4 MG/1
4 TABLET, ORALLY DISINTEGRATING ORAL ONCE
Status: COMPLETED | OUTPATIENT
Start: 2025-01-01 | End: 2025-01-01

## 2025-01-01 RX ORDER — ONDANSETRON 4 MG/1
4 TABLET, ORALLY DISINTEGRATING ORAL EVERY 6 HOURS PRN
Qty: 12 TABLET | Refills: 0 | Status: SHIPPED | OUTPATIENT
Start: 2025-01-01 | End: 2025-01-04

## 2025-01-01 RX ADMIN — ONDANSETRON 4 MG: 4 TABLET, ORALLY DISINTEGRATING ORAL at 09:19

## 2025-01-01 NOTE — ED PROVIDER NOTES
Time reflects when diagnosis was documented in both MDM as applicable and the Disposition within this note       Time User Action Codes Description Comment    1/1/2025 10:20 AM Humberto De Leon [B34.9] Viral syndrome     1/1/2025 10:20 AM Humberto De Leon [R19.7] Diarrhea     1/1/2025 10:21 AM Humberto De Leon Add [R11.2] Nausea & vomiting           ED Disposition       ED Disposition   Discharge    Condition   Stable    Date/Time   Wed Jan 1, 2025 10:20 AM    Comment   Darnell Gottlieb discharge to home/self care.                   Assessment & Plan       Medical Decision Making  24-year-old male with past medical history of asthma, GERD, and HTN presents to the ED for evaluation of nausea and vomiting and diarrhea since this morning.  Patient reports nonbloody nonbilious vomiting and nonbloody watery diarrhea.  Patient was evaluated at urgent care 2 weeks ago and was prescribed clindamycin.  Patient unsure why and is unsure if he completed the course of antibiotics.  Patient denies abdominal pain, fever, chest pain, shortness of breath    On physical exam patient well-appearing, lungs clear to auscultation bilaterally.  Heart regular rate and rhythm.  Abdomen soft nontender.     Symptoms consistent with viral illness.  Will treat with Zofran and p.o. challenge.  Upon reevaluation patient remains pain-free and reports improvement in his nausea.  Patient tolerated p.o.  Patient discharged home with instructions to follow-up with PCP.  Strict return precaution given    Risk  Prescription drug management.             Medications   ondansetron (ZOFRAN-ODT) dispersible tablet 4 mg (4 mg Oral Given 1/1/25 0919)       ED Risk Strat Scores                          SBIRT 22yo+      Flowsheet Row Most Recent Value   Initial Alcohol Screen: US AUDIT-C     1. How often do you have a drink containing alcohol? 0 Filed at: 01/01/2025 0853   2. How many drinks containing alcohol do you have on a typical day you are drinking?  0 Filed at: 01/01/2025  53   3a. Male UNDER 65: How often do you have five or more drinks on one occasion? 0 Filed at: 2025   3b. FEMALE Any Age, or MALE 65+: How often do you have 4 or more drinks on one occassion? 0 Filed at: 2025   Audit-C Score 0 Filed at: 2025   ASTRID: How many times in the past year have you...    Used an illegal drug or used a prescription medication for non-medical reasons? Never Filed at: 2025                            History of Present Illness       Chief Complaint   Patient presents with    Flu Symptoms     Began this morning. +N/V/D. Denies fever and cough. Denies sob and cp.        Past Medical History:   Diagnosis Date    Allergic     Anxiety     Arthritis     Asthma     Asthma due to seasonal allergies     GERD (gastroesophageal reflux disease)     Headache(784.0)     HL (hearing loss)     Hypertension     Memory loss       Past Surgical History:   Procedure Laterality Date    COLONOSCOPY      NO PAST SURGERIES      UPPER GASTROINTESTINAL ENDOSCOPY      WISDOM TOOTH EXTRACTION        Family History   Problem Relation Age of Onset    Fibromyalgia Mother     Rheum arthritis Father       Social History     Tobacco Use    Smoking status: Former     Current packs/day: 0.00     Types: Cigarettes     Quit date: 2019     Years since quittin.0     Passive exposure: Past    Smokeless tobacco: Current    Tobacco comments:     vapes   Vaping Use    Vaping status: Every Day    Start date: 2014    Substances: Nicotine, Flavoring   Substance Use Topics    Alcohol use: Not Currently     Comment: every few months    Drug use: Never      E-Cigarette/Vaping    E-Cigarette Use Current Every Day User     Start Date 14     Cartridges/Day 1 cartridge every 2 weeks       E-Cigarette/Vaping Substances    Nicotine Yes     THC No     CBD No     Flavoring Yes     Other No     Unknown No       I have reviewed and agree with the history as documented.       Flu  Symptoms  Presenting symptoms: diarrhea, nausea and vomiting    Presenting symptoms: no cough, no fever, no shortness of breath and no sore throat    Associated symptoms: no chills and no ear pain        Review of Systems   Constitutional:  Negative for chills and fever.   HENT:  Negative for ear pain and sore throat.    Eyes:  Negative for pain and visual disturbance.   Respiratory:  Negative for cough and shortness of breath.    Cardiovascular:  Negative for chest pain and palpitations.   Gastrointestinal:  Positive for diarrhea, nausea and vomiting. Negative for abdominal pain.   Genitourinary:  Negative for dysuria and hematuria.   Musculoskeletal:  Negative for arthralgias and back pain.   Skin:  Negative for color change and rash.   Neurological:  Negative for seizures and syncope.   All other systems reviewed and are negative.          Objective       ED Triage Vitals [01/01/25 0852]   Temperature Pulse Blood Pressure Respirations SpO2 Patient Position - Orthostatic VS   98.1 °F (36.7 °C) 97 117/72 20 97 % --      Temp Source Heart Rate Source BP Location FiO2 (%) Pain Score    Oral Monitor -- -- --      Vitals      Date and Time Temp Pulse SpO2 Resp BP Pain Score FACES Pain Rating User   01/01/25 0852 98.1 °F (36.7 °C) 97 97 % 20 117/72 -- -- KM            Physical Exam  Vitals and nursing note reviewed.   Constitutional:       General: He is not in acute distress.     Appearance: Normal appearance. He is well-developed. He is not ill-appearing or toxic-appearing.   HENT:      Head: Normocephalic and atraumatic.      Right Ear: Ear canal and external ear normal. There is no impacted cerumen.      Left Ear: Ear canal and external ear normal. There is no impacted cerumen.      Nose: Nose normal. No congestion.      Mouth/Throat:      Mouth: Mucous membranes are moist.      Tongue: No lesions. Tongue does not deviate from midline.      Palate: No mass and lesions.      Pharynx: No pharyngeal swelling,  oropharyngeal exudate, posterior oropharyngeal erythema or uvula swelling.      Tonsils: No tonsillar exudate or tonsillar abscesses.   Eyes:      General: No scleral icterus.        Right eye: No discharge.      Extraocular Movements: Extraocular movements intact.      Conjunctiva/sclera: Conjunctivae normal.   Cardiovascular:      Rate and Rhythm: Normal rate and regular rhythm.      Pulses: Normal pulses.      Heart sounds: No murmur heard.  Pulmonary:      Effort: Pulmonary effort is normal. No respiratory distress.      Breath sounds: Normal breath sounds. No wheezing.   Chest:      Chest wall: No tenderness.   Abdominal:      General: Abdomen is flat. There is no distension.      Palpations: Abdomen is soft.      Tenderness: There is no abdominal tenderness.   Musculoskeletal:         General: No swelling, deformity or signs of injury. Normal range of motion.      Cervical back: Normal range of motion and neck supple. No rigidity or tenderness.      Right lower leg: No edema.      Left lower leg: No edema.   Skin:     General: Skin is warm and dry.      Capillary Refill: Capillary refill takes less than 2 seconds.   Neurological:      General: No focal deficit present.      Mental Status: He is alert and oriented to person, place, and time.      Motor: No weakness.   Psychiatric:         Mood and Affect: Mood normal.         Results Reviewed       None            No orders to display       Procedures    ED Medication and Procedure Management   Prior to Admission Medications   Prescriptions Last Dose Informant Patient Reported? Taking?   EPINEPHrine (EPIPEN) 0.3 mg/0.3 mL SOAJ  Self No No   Sig: Inject 0.3 mL (0.3 mg total) into a muscle once for 1 dose   albuterol (2.5 mg/3 mL) 0.083 % nebulizer solution  Self No No   Sig: Take 3 mL (2.5 mg total) by nebulization every 6 (six) hours as needed for wheezing or shortness of breath   Patient not taking: Reported on 12/24/2024   albuterol (ACCUNEB) 1.25 MG/3ML  nebulizer solution  Self Yes No   Sig: Inhale 1 ampule every 6 (six) hours as needed   Patient not taking: Reported on 12/24/2024   albuterol (Ventolin HFA) 90 mcg/act inhaler  Self No No   Sig: Inhale 2 puffs every 6 (six) hours as needed for wheezing   amphetamine-dextroamphetamine (ADDERALL XR, 10MG,) 10 MG 24 hr capsule   No No   Sig: Take 1 capsule (10 mg total) by mouth every morning Max Daily Amount: 10 mg   clindamycin (CLEOCIN) 150 mg capsule   No No   Sig: Take 3 capsules (450 mg total) by mouth every 8 (eight) hours for 10 days   omeprazole (PriLOSEC) 40 MG capsule   No No   Sig: Take 1 capsule (40 mg total) by mouth every evening   sildenafil (VIAGRA) 50 MG tablet   No No   Sig: Take 1 tablet (50 mg total) by mouth daily as needed for erectile dysfunction   valsartan (DIOVAN) 80 mg tablet   No No   Sig: Take 1 tablet (80 mg total) by mouth daily      Facility-Administered Medications: None     Patient's Medications   Discharge Prescriptions    No medications on file     No discharge procedures on file.  ED SEPSIS DOCUMENTATION   Time reflects when diagnosis was documented in both MDM as applicable and the Disposition within this note       Time User Action Codes Description Comment    1/1/2025 10:20 AM Humberto De Leon [B34.9] Viral syndrome     1/1/2025 10:20 AM Humberto De Leon [R19.7] Diarrhea     1/1/2025 10:21 AM Humberto De Leon [R11.2] Nausea & vomiting                  Humberto De Leon DO  01/01/25 1025

## 2025-01-01 NOTE — DISCHARGE INSTRUCTIONS
You were evaluated in the Emergency Department today for nasuea and vomiting.     Can take motrin and tylenol together every 6 hours for pain and fever control.    Please schedule an appointment with your primary care physician within the next 2-3 days.    Return to the Emergency Department if you experience worsening or uncontrolled pain, fevers 100.4°F or greater, recurrent vomiting, inability to tolerate food or fluids by mouth, bloody stools or vomit, black or tarry stools, or any other concerning symptoms.    Thank you for choosing us for your care.

## 2025-01-01 NOTE — ED ATTENDING ATTESTATION
1/1/2025  I, Cruzito Andino MD, saw and evaluated the patient. I have discussed the patient with the resident/non-physician practitioner and agree with the resident's/non-physician practitioner's findings, Plan of Care, and MDM as documented in the resident's/non-physician practitioner's note, except where noted. All available labs and Radiology studies were reviewed.  I was present for key portions of any procedure(s) performed by the resident/non-physician practitioner and I was immediately available to provide assistance.       At this point I agree with the current assessment done in the Emergency Department.  I have conducted an independent evaluation of this patient a history and physical is as follows:    24-year-old man presenting with nausea, vomiting and diarrhea.  Has mild generalized abdominal cramping.  No fever.  Awake and alert no acute distress.  Abdomen is soft, nontender, nondistended.  Will treat symptomatically, give return precautions.    ED Course         Critical Care Time  Procedures

## 2025-01-01 NOTE — ED NOTES
Attempting PO challenge at this time. Snacks provided to patient.     Emily Wright RN  01/01/25 0941    
Patient was able to tolerate PO challenge. Provider made aware at this time.      Emily Wright RN  01/01/25 1016    
Discharged

## 2025-01-26 ENCOUNTER — OFFICE VISIT (OUTPATIENT)
Age: 25
End: 2025-01-26
Payer: COMMERCIAL

## 2025-01-26 VITALS
BODY MASS INDEX: 32.58 KG/M2 | TEMPERATURE: 97.4 F | OXYGEN SATURATION: 98 % | SYSTOLIC BLOOD PRESSURE: 132 MMHG | DIASTOLIC BLOOD PRESSURE: 88 MMHG | HEIGHT: 69 IN | WEIGHT: 220 LBS | HEART RATE: 87 BPM | RESPIRATION RATE: 18 BRPM

## 2025-01-26 DIAGNOSIS — R59.9 SWOLLEN LYMPH NODES: ICD-10-CM

## 2025-01-26 DIAGNOSIS — H57.89 EYE DRAINAGE: Primary | ICD-10-CM

## 2025-01-26 PROCEDURE — 99213 OFFICE O/P EST LOW 20 MIN: CPT

## 2025-01-26 RX ORDER — POLYMYXIN B SULFATE AND TRIMETHOPRIM 1; 10000 MG/ML; [USP'U]/ML
1 SOLUTION OPHTHALMIC EVERY 6 HOURS
Qty: 10 ML | Refills: 0 | Status: SHIPPED | OUTPATIENT
Start: 2025-01-26

## 2025-01-26 RX ORDER — PREDNISONE 10 MG/1
TABLET ORAL
Qty: 21 TABLET | Refills: 0 | Status: SHIPPED | OUTPATIENT
Start: 2025-01-26 | End: 2025-02-05

## 2025-01-26 NOTE — PROGRESS NOTES
[facility]        NAME: Darnell Gottlieb is a 24 y.o. male  : 2000    MRN: [id number]  DATE: 2025  TIME: 12:31 PM    Assessment and Plan   Eye drainage [H57.89]  1. Eye drainage  polymyxin b-trimethoprim (POLYTRIM) ophthalmic solution      2. Swollen lymph nodes  predniSONE 10 mg tablet            Patient Instructions   Take steroids as prescribed- this will help with the lump sensation you have in your throat - I suspect this is related to swollen lymph nodes from a recent viral illness.  At this time but based on your symptoms and mild redness to the right eye - would cover for bacterial cause - antibiotic eyedrop as prescribed.      Follow up with Primary Care Provider in 3-5 days if not improving.  Proceed to Emergency Department if symptoms worsen.    If tests have been performed at [facility], our office will contact you with results if changes need to be made to the care plan discussed with you at the visit.  You can review your full results on [facility] MyChart.    Chief Complaint     Chief Complaint   Patient presents with   • Sore Throat     C/o slight sore throat and feeling like a lump in back of throat x 1 wk.          History of Present Illness       Patient reports sore throat starting 1 week ago which has since resolved but about 3 days ago fe a lump in the back of his throat.  He reports that occasionally he continues yellow drainage from the back of his throat.  He also reports that about 3 days ago he noticed drainage from both his eyes early in the morning that returns mildly throughout the day.  Also has some mild redness to the right eye    Sore Throat   Pertinent negatives include no abdominal pain, congestion, coughing, ear pain, shortness of breath, trouble swallowing or vomiting.       Review of Systems   Review of Systems   Constitutional:  Negative for chills and fever.   HENT:  Positive for sore throat. Negative for congestion, ear pain, postnasal drip,  rhinorrhea, trouble swallowing and voice change.    Eyes:  Positive for discharge and redness. Negative for pain and visual disturbance.   Respiratory:  Negative for cough and shortness of breath.    Cardiovascular:  Negative for chest pain and palpitations.   Gastrointestinal:  Negative for abdominal pain and vomiting.   Genitourinary:  Negative for dysuria and hematuria.   Musculoskeletal:  Negative for arthralgias and back pain.   Skin:  Negative for color change and rash.   Neurological:  Negative for dizziness, seizures, syncope, weakness and light-headedness.   All other systems reviewed and are negative.        Current Medications       Current Outpatient Medications:   •  albuterol (Ventolin HFA) 90 mcg/act inhaler, Inhale 2 puffs every 6 (six) hours as needed for wheezing, Disp: 6.7 g, Rfl: 5  •  amphetamine-dextroamphetamine (ADDERALL XR, 10MG,) 10 MG 24 hr capsule, Take 1 capsule (10 mg total) by mouth every morning Max Daily Amount: 10 mg, Disp: 30 capsule, Rfl: 0  •  omeprazole (PriLOSEC) 40 MG capsule, Take 1 capsule (40 mg total) by mouth every evening, Disp: 90 capsule, Rfl: 3  •  polymyxin b-trimethoprim (POLYTRIM) ophthalmic solution, Administer 1 drop to both eyes every 6 (six) hours While awake for 7 days, Disp: 10 mL, Rfl: 0  •  predniSONE 10 mg tablet, There are 21 total tablets: 6 tablets on day 1, 5 tablets on day 2, 4 tablets on day 3, 3 tablets on day 4, 2 tablets on day 5, 1 tablet on day 6., Disp: 21 tablet, Rfl: 0  •  sildenafil (VIAGRA) 50 MG tablet, Take 1 tablet (50 mg total) by mouth daily as needed for erectile dysfunction, Disp: 10 tablet, Rfl: 0  •  valsartan (DIOVAN) 80 mg tablet, Take 1 tablet (80 mg total) by mouth daily, Disp: 90 tablet, Rfl: 3  •  albuterol (2.5 mg/3 mL) 0.083 % nebulizer solution, Take 3 mL (2.5 mg total) by nebulization every 6 (six) hours as needed for wheezing or shortness of breath (Patient not taking: Reported on 12/24/2024), Disp: 180 mL, Rfl: 5  •   "albuterol (ACCUNEB) 1.25 MG/3ML nebulizer solution, Inhale 1 ampule every 6 (six) hours as needed (Patient not taking: Reported on 12/24/2024), Disp: , Rfl:   •  EPINEPHrine (EPIPEN) 0.3 mg/0.3 mL SOAJ, Inject 0.3 mL (0.3 mg total) into a muscle once for 1 dose, Disp: 0.6 mL, Rfl: 0  •  ondansetron (ZOFRAN-ODT) 4 mg disintegrating tablet, Take 1 tablet (4 mg total) by mouth every 6 (six) hours as needed for nausea or vomiting for up to 3 days, Disp: 12 tablet, Rfl: 0    Current Allergies     Allergies as of 01/26/2025 - Reviewed 01/26/2025   Allergen Reaction Noted   • Nuts - food allergy Anaphylaxis    • Other Anaphylaxis 08/06/2017   • Shellfish-derived products - food allergy Anaphylaxis 08/06/2017   • Latex Itching 10/03/2022   • Peanut oil - food allergy  05/03/2017   • Penicillins  10/30/2020            The following portions of the patient's history were reviewed and updated as appropriate: allergies, current medications, past family history, past medical history, past social history, past surgical history and problem list.     Past Medical History:   Diagnosis Date   • Allergic    • Anxiety    • Arthritis    • Asthma    • Asthma due to seasonal allergies    • GERD (gastroesophageal reflux disease)    • Headache(784.0)    • HL (hearing loss)    • Hypertension    • Memory loss        Past Surgical History:   Procedure Laterality Date   • COLONOSCOPY     • NO PAST SURGERIES     • UPPER GASTROINTESTINAL ENDOSCOPY     • WISDOM TOOTH EXTRACTION         Family History   Problem Relation Age of Onset   • Fibromyalgia Mother    • Rheum arthritis Father          Medications have been verified.        Objective   /88 (BP Location: Right arm, Patient Position: Sitting)   Pulse 87   Temp (!) 97.4 °F (36.3 °C)   Resp 18   Ht 5' 9\" (1.753 m)   Wt 99.8 kg (220 lb)   SpO2 98%   BMI 32.49 kg/m²   No LMP for male patient.       Physical Exam     Physical Exam  Vitals and nursing note reviewed.   Constitutional:  "      Appearance: Normal appearance.   HENT:      Head: Normocephalic and atraumatic.      Nose: No congestion or rhinorrhea.      Mouth/Throat:      Mouth: Mucous membranes are moist.      Pharynx: No pharyngeal swelling, oropharyngeal exudate or posterior oropharyngeal erythema.      Tonsils: No tonsillar exudate.   Cardiovascular:      Rate and Rhythm: Normal rate.   Pulmonary:      Effort: Pulmonary effort is normal.   Musculoskeletal:      Cervical back: Normal range of motion.   Lymphadenopathy:      Cervical: Cervical adenopathy present.   Skin:     General: Skin is warm and dry.      Capillary Refill: Capillary refill takes less than 2 seconds.   Neurological:      General: No focal deficit present.      Mental Status: He is alert and oriented to person, place, and time. Mental status is at baseline.      Sensory: No sensory deficit.      Motor: No weakness.   Psychiatric:         Mood and Affect: Mood normal.         Behavior: Behavior normal.         Thought Content: Thought content normal.

## 2025-01-26 NOTE — PATIENT INSTRUCTIONS
Take steroids as prescribed- this will help with the lump sensation you have in your throat - I suspect this is related to swollen lymph nodes from a recent viral illness.  At this time but based on your symptoms and mild redness to the right eye - would cover for bacterial cause - antibiotic eyedrop as prescribed.      Follow up with Primary Care Provider in 3-5 days if not improving.  Proceed to Emergency Department if symptoms worsen.    If tests have been performed at [facility], our office will contact you with results if changes need to be made to the care plan discussed with you at the visit.  You can review your full results on [facility] MyChart.

## 2025-01-31 ENCOUNTER — APPOINTMENT (EMERGENCY)
Dept: RADIOLOGY | Facility: HOSPITAL | Age: 25
End: 2025-01-31
Payer: COMMERCIAL

## 2025-01-31 ENCOUNTER — HOSPITAL ENCOUNTER (EMERGENCY)
Facility: HOSPITAL | Age: 25
Discharge: HOME | End: 2025-01-31
Attending: EMERGENCY MEDICINE
Payer: COMMERCIAL

## 2025-01-31 VITALS
BODY MASS INDEX: 33.31 KG/M2 | RESPIRATION RATE: 18 BRPM | SYSTOLIC BLOOD PRESSURE: 146 MMHG | TEMPERATURE: 98.4 F | WEIGHT: 225.53 LBS | DIASTOLIC BLOOD PRESSURE: 66 MMHG | OXYGEN SATURATION: 100 % | HEART RATE: 104 BPM

## 2025-01-31 DIAGNOSIS — I10 ESSENTIAL HYPERTENSION: ICD-10-CM

## 2025-01-31 DIAGNOSIS — J20.9 ACUTE BRONCHITIS: Primary | ICD-10-CM

## 2025-01-31 LAB
FLUAV RNA RESP QL NAA+PROBE: NEGATIVE
FLUBV RNA RESP QL NAA+PROBE: NEGATIVE
RSV RNA RESP QL NAA+PROBE: NEGATIVE
SARS-COV-2 RNA RESP QL NAA+PROBE: NEGATIVE

## 2025-01-31 PROCEDURE — 99284 EMERGENCY DEPT VISIT MOD MDM: CPT | Performed by: EMERGENCY MEDICINE

## 2025-01-31 PROCEDURE — 0241U HB NFCT DS VIR RESP RNA 4 TRGT: CPT | Performed by: EMERGENCY MEDICINE

## 2025-01-31 PROCEDURE — 71046 X-RAY EXAM CHEST 2 VIEWS: CPT

## 2025-01-31 PROCEDURE — 99283 EMERGENCY DEPT VISIT LOW MDM: CPT

## 2025-01-31 RX ORDER — GUAIFENESIN 600 MG/1
600 TABLET, EXTENDED RELEASE ORAL 2 TIMES DAILY
Qty: 10 TABLET | Refills: 0 | Status: SHIPPED | OUTPATIENT
Start: 2025-01-31 | End: 2025-02-05

## 2025-01-31 RX ORDER — BENZONATATE 100 MG/1
100 CAPSULE ORAL 3 TIMES DAILY PRN
Qty: 21 CAPSULE | Refills: 0 | Status: SHIPPED | OUTPATIENT
Start: 2025-01-31 | End: 2025-02-04

## 2025-01-31 RX ORDER — ALBUTEROL SULFATE 90 UG/1
2 INHALANT RESPIRATORY (INHALATION) EVERY 6 HOURS PRN
Qty: 6.7 G | Refills: 0 | Status: SHIPPED | OUTPATIENT
Start: 2025-01-31

## 2025-01-31 RX ORDER — AZELASTINE 1 MG/ML
1 SPRAY, METERED NASAL 2 TIMES DAILY
Qty: 1 ML | Refills: 0 | Status: SHIPPED | OUTPATIENT
Start: 2025-01-31

## 2025-01-31 RX ORDER — AZITHROMYCIN 250 MG/1
TABLET, FILM COATED ORAL
Qty: 6 TABLET | Refills: 0 | Status: SHIPPED | OUTPATIENT
Start: 2025-01-31 | End: 2025-02-04

## 2025-01-31 NOTE — ED PROVIDER NOTES
Time reflects when diagnosis was documented in both MDM as applicable and the Disposition within this note       Time User Action Codes Description Comment    1/31/2025  6:16 PM Eran Edmondson Add [J20.9] Acute bronchitis     1/31/2025  6:16 PM Eran Edmondson Add [I10] Essential hypertension           ED Disposition       ED Disposition   Discharge    Condition   Stable    Date/Time   Fri Jan 31, 2025  6:16 PM    Comment   Darnell Gottlieb discharge to home/self care.                   Assessment & Plan       Medical Decision Making  Multiple planes likely secondary to bronchitis less likely secondary to pneumonia, doubt pulmonary embolism.  Will do COVID fluoroscopy swabs, chest x-ray rule out pneumonia, treat symptoms.    Amount and/or Complexity of Data Reviewed  Radiology: ordered and independent interpretation performed.    Risk  OTC drugs.  Prescription drug management.        ED Course as of 01/31/25 1817 Fri Jan 31, 2025 1816 Chest x-ray negative for pneumonia.  Vital signs are stable.  Will treat for acute bronchitis, reassure, , outpatient follow up       Medications - No data to display    ED Risk Strat Scores                          SBIRT 20yo+      Flowsheet Row Most Recent Value   Initial Alcohol Screen: US AUDIT-C     1. How often do you have a drink containing alcohol? 0 Filed at: 01/31/2025 1604   2. How many drinks containing alcohol do you have on a typical day you are drinking?  0 Filed at: 01/31/2025 1604   3a. Male UNDER 65: How often do you have five or more drinks on one occasion? 0 Filed at: 01/31/2025 1604   3b. FEMALE Any Age, or MALE 65+: How often do you have 4 or more drinks on one occassion? 0 Filed at: 01/31/2025 1604   Audit-C Score 0 Filed at: 01/31/2025 1604   ASTRID: How many times in the past year have you...    Used an illegal drug or used a prescription medication for non-medical reasons? Never Filed at: 01/31/2025 1604                            History of Present Illness  "      Chief Complaint   Patient presents with    Medical Problem     Pt states he \"has golf ball like thing jammed in my throat for the past 2 weeks\". Pt reports coughing up blood tinged sputum. Denies recently choking on anything. Pt seen at urgent care for same and given po steroids without relief. No sob.        Past Medical History:   Diagnosis Date    Allergic     Anxiety     Arthritis     Asthma     Asthma due to seasonal allergies     GERD (gastroesophageal reflux disease)     Headache(784.0)     HL (hearing loss)     Hypertension     Memory loss       Past Surgical History:   Procedure Laterality Date    COLONOSCOPY      NO PAST SURGERIES      UPPER GASTROINTESTINAL ENDOSCOPY      WISDOM TOOTH EXTRACTION        Family History   Problem Relation Age of Onset    Fibromyalgia Mother     Rheum arthritis Father       Social History     Tobacco Use    Smoking status: Former     Current packs/day: 0.00     Types: Cigarettes     Quit date: 2019     Years since quittin.1     Passive exposure: Past    Smokeless tobacco: Current    Tobacco comments:     vapes   Vaping Use    Vaping status: Every Day    Start date: 2014    Substances: Nicotine, Flavoring   Substance Use Topics    Alcohol use: Not Currently     Comment: every few months    Drug use: Never      E-Cigarette/Vaping    E-Cigarette Use Current Every Day User     Start Date 14     Cartridges/Day 1 cartridge every 2 weeks       E-Cigarette/Vaping Substances    Nicotine Yes     THC No     CBD No     Flavoring Yes     Other No     Unknown No       I have reviewed and agree with the history as documented.       Flu Symptoms  Presenting symptoms: cough, fatigue, fever, headache, myalgias, rhinorrhea and sore throat (feels sore like there's a golf ball, no difficulty swallowing, no change in speech)    Presenting symptoms: no shortness of breath    Presenting symptoms comment:  Spits up sputum with tinges of blood    Severity:  Moderate  Onset " quality:  Gradual  Duration:  2 weeks  Progression:  Unchanged  Chronicity:  New  Relieved by:  Nothing  Worsened by:  Nothing      Review of Systems   Constitutional:  Positive for fatigue and fever.   HENT:  Positive for rhinorrhea and sore throat (feels sore like there's a golf ball, no difficulty swallowing, no change in speech).    Respiratory:  Positive for cough. Negative for shortness of breath.    Musculoskeletal:  Positive for myalgias.   Neurological:  Positive for headaches.           Objective       ED Triage Vitals   Temperature Pulse Blood Pressure Respirations SpO2 Patient Position - Orthostatic VS   01/31/25 1601 01/31/25 1601 01/31/25 1603 01/31/25 1601 01/31/25 1601 --   98.4 °F (36.9 °C) 104 146/66 18 100 %       Temp Source Heart Rate Source BP Location FiO2 (%) Pain Score    01/31/25 1601 01/31/25 1601 -- -- --    Oral Monitor         Vitals      Date and Time Temp Pulse SpO2 Resp BP Pain Score FACES Pain Rating User   01/31/25 1603 -- -- -- -- 146/66 -- -- RM   01/31/25 1601 98.4 °F (36.9 °C) 104 100 % 18 -- -- -- RM            Physical Exam  Constitutional:       Appearance: He is well-developed.   HENT:      Head: Normocephalic and atraumatic.      Nose: Congestion and rhinorrhea present.      Mouth/Throat:      Pharynx: Posterior oropharyngeal erythema present. No oropharyngeal exudate.      Comments: Post nasal drip  Eyes:      General: No scleral icterus.     Conjunctiva/sclera: Conjunctivae normal.   Neck:      Vascular: No JVD.      Trachea: No tracheal deviation.   Cardiovascular:      Rate and Rhythm: Normal rate and regular rhythm.   Pulmonary:      Effort: Pulmonary effort is normal. No respiratory distress.      Breath sounds: Normal breath sounds.   Abdominal:      General: There is no distension.      Palpations: There is no mass.      Tenderness: There is no abdominal tenderness.      Hernia: No hernia is present.   Musculoskeletal:         General: No deformity. Normal range of  motion.      Cervical back: Normal range of motion.   Skin:     Coloration: Skin is not pale.      Findings: No erythema or rash.   Neurological:      Mental Status: He is alert and oriented to person, place, and time.   Psychiatric:         Behavior: Behavior normal.         Results Reviewed       Procedure Component Value Units Date/Time    FLU/RSV/COVID - if FLU/RSV clinically relevant (2hr TAT) [[id number]]  (Normal) Collected: 01/31/25 1720    Lab Status: Final result Specimen: Nares from Nose Updated: 01/31/25 1801     SARS-CoV-2 Negative     INFLUENZA A PCR Negative     INFLUENZA B PCR Negative     RSV PCR Negative    Narrative:      This test has been performed using the CoV-2/Flu/RSV plus assay on the Instant [employer] platform. This test has been validated by the  and verified by the performing laboratory.     This test is designed to amplify and detect the following: nucleocapsid (N), envelope (E), and RNA-dependent RNA polymerase (RdRP) genes of the SARS-CoV-2 genome; matrix (M), basic polymerase (PB2), and acidic protein (PA) segments of the influenza A genome; matrix (M) and non-structural protein (NS) segments of the influenza B genome, and the nucleocapsid genes of RSV A and RSV B.     Positive results are indicative of the presence of Flu A, Flu B, RSV, and/or SARS-CoV-2 RNA. Positive results for SARS-CoV-2 or suspected novel influenza should be reported to state, local, or federal health departments according to local reporting requirements.      All results should be assessed in conjunction with clinical presentation and other laboratory markers for clinical management.     FOR PEDIATRIC PATIENTS - copy/paste COVID Guidelines URL to browser: [email or web address]               XR chest 2 views   ED Interpretation by Eran Edmondson MD (01/31 1816)   Primary reviewed: no acute abnormality          Procedures    ED Medication and  Procedure Management   Prior to Admission Medications   Prescriptions Last Dose Informant Patient Reported? Taking?   EPINEPHrine (EPIPEN) 0.3 mg/0.3 mL SOAJ  Self No No   Sig: Inject 0.3 mL (0.3 mg total) into a muscle once for 1 dose   albuterol (2.5 mg/3 mL) 0.083 % nebulizer solution  Self No No   Sig: Take 3 mL (2.5 mg total) by nebulization every 6 (six) hours as needed for wheezing or shortness of breath   Patient not taking: Reported on 12/24/2024   albuterol (ACCUNEB) 1.25 MG/3ML nebulizer solution  Self Yes No   Sig: Inhale 1 ampule every 6 (six) hours as needed   Patient not taking: Reported on 12/24/2024   albuterol (Ventolin HFA) 90 mcg/act inhaler  Self No No   Sig: Inhale 2 puffs every 6 (six) hours as needed for wheezing   amphetamine-dextroamphetamine (ADDERALL XR, 10MG,) 10 MG 24 hr capsule   No No   Sig: Take 1 capsule (10 mg total) by mouth every morning Max Daily Amount: 10 mg   omeprazole (PriLOSEC) 40 MG capsule   No No   Sig: Take 1 capsule (40 mg total) by mouth every evening   ondansetron (ZOFRAN-ODT) 4 mg disintegrating tablet   No No   Sig: Take 1 tablet (4 mg total) by mouth every 6 (six) hours as needed for nausea or vomiting for up to 3 days   polymyxin b-trimethoprim (POLYTRIM) ophthalmic solution   No No   Sig: Administer 1 drop to both eyes every 6 (six) hours While awake for 7 days   predniSONE 10 mg tablet   No No   Sig: There are 21 total tablets: 6 tablets on day 1, 5 tablets on day 2, 4 tablets on day 3, 3 tablets on day 4, 2 tablets on day 5, 1 tablet on day 6.   sildenafil (VIAGRA) 50 MG tablet   No No   Sig: Take 1 tablet (50 mg total) by mouth daily as needed for erectile dysfunction   valsartan (DIOVAN) 80 mg tablet   No No   Sig: Take 1 tablet (80 mg total) by mouth daily      Facility-Administered Medications: None     Patient's Medications   Discharge Prescriptions    ALBUTEROL (PROVENTIL HFA) 90 MCG/ACT INHALER    Inhale 2 puffs every 6 (six) hours as needed for  wheezing       Start Date: 1/31/2025 End Date: --       Order Dose: 2 puffs       Quantity: 6.7 g    Refills: 0    AZELASTINE (ASTELIN) 0.1 % NASAL SPRAY    1 spray into each nostril 2 (two) times a day Use in each nostril as directed       Start Date: 1/31/2025 End Date: --       Order Dose: 1 spray       Quantity: 1 mL    Refills: 0    AZITHROMYCIN (ZITHROMAX Z-CUBA) 250 MG TABLET    Take 2 tabs x 1 day, then 1 tab x 4 days       Start Date: 1/31/2025 End Date: 2/4/2025       Order Dose: --       Quantity: 6 tablet    Refills: 0    BENZONATATE (TESSALON PERLES) 100 MG CAPSULE    Take 1 capsule (100 mg total) by mouth 3 (three) times a day as needed for cough for up to 7 days       Start Date: 1/31/2025 End Date: 2/7/2025       Order Dose: 100 mg       Quantity: 21 capsule    Refills: 0    GUAIFENESIN (MUCINEX) 600 MG 12 HR TABLET    Take 1 tablet (600 mg total) by mouth 2 (two) times a day for 5 days       Start Date: 1/31/2025 End Date: 2/5/2025       Order Dose: 600 mg       Quantity: 10 tablet    Refills: 0     No discharge procedures on file.  ED SEPSIS DOCUMENTATION   Time reflects when diagnosis was documented in both MDM as applicable and the Disposition within this note       Time User Action Codes Description Comment    1/31/2025  6:16 PM Eran Edmondson [J20.9] Acute bronchitis     1/31/2025  6:16 PM Eran Edmondson Add [I10] Essential hypertension                  Eran Edmondson MD  01/31/25 1818

## 2025-02-02 ENCOUNTER — APPOINTMENT (OUTPATIENT)
Dept: LAB | Facility: HOSPITAL | Age: 25
End: 2025-02-02
Payer: COMMERCIAL

## 2025-02-02 DIAGNOSIS — E78.2 MIXED HYPERLIPIDEMIA: ICD-10-CM

## 2025-02-02 DIAGNOSIS — I10 ESSENTIAL HYPERTENSION: ICD-10-CM

## 2025-02-02 DIAGNOSIS — Z13.89 SCREENING FOR BLOOD OR PROTEIN IN URINE: ICD-10-CM

## 2025-02-02 DIAGNOSIS — Z13.1 SCREENING FOR DIABETES MELLITUS: ICD-10-CM

## 2025-02-02 LAB
ANION GAP SERPL CALCULATED.3IONS-SCNC: 5 MMOL/L (ref 4–13)
BACTERIA UR QL AUTO: NORMAL /HPF
BILIRUB UR QL STRIP: NEGATIVE
BUN SERPL-MCNC: 13 MG/DL (ref 5–25)
CALCIUM SERPL-MCNC: 9.6 MG/DL (ref 8.4–10.2)
CHLORIDE SERPL-SCNC: 103 MMOL/L (ref 96–108)
CHOLEST SERPL-MCNC: 254 MG/DL (ref ?–200)
CLARITY UR: CLEAR
CO2 SERPL-SCNC: 30 MMOL/L (ref 21–32)
COLOR UR: YELLOW
CREAT SERPL-MCNC: 0.9 MG/DL (ref 0.6–1.3)
EST. AVERAGE GLUCOSE BLD GHB EST-MCNC: 105 MG/DL
GFR SERPL CREATININE-BSD FRML MDRD: 117 ML/MIN/1.73SQ M
GLUCOSE P FAST SERPL-MCNC: 86 MG/DL (ref 65–99)
GLUCOSE UR STRIP-MCNC: NEGATIVE MG/DL
HBA1C MFR BLD: 5.3 %
HDLC SERPL-MCNC: 57 MG/DL
HGB UR QL STRIP.AUTO: NEGATIVE
KETONES UR STRIP-MCNC: NEGATIVE MG/DL
LDLC SERPL CALC-MCNC: 155 MG/DL (ref 0–100)
LEUKOCYTE ESTERASE UR QL STRIP: NEGATIVE
NITRITE UR QL STRIP: NEGATIVE
NON-SQ EPI CELLS URNS QL MICRO: NORMAL /HPF
NONHDLC SERPL-MCNC: 197 MG/DL
PH UR STRIP.AUTO: 6 [PH]
POTASSIUM SERPL-SCNC: 4.2 MMOL/L (ref 3.5–5.3)
PROT UR STRIP-MCNC: ABNORMAL MG/DL
RBC #/AREA URNS AUTO: NORMAL /HPF
SODIUM SERPL-SCNC: 138 MMOL/L (ref 135–147)
SP GR UR STRIP.AUTO: 1.03 (ref 1–1.03)
TRIGL SERPL-MCNC: 210 MG/DL (ref ?–150)
TSH SERPL DL<=0.05 MIU/L-ACNC: 4.11 UIU/ML (ref 0.45–4.5)
UROBILINOGEN UR STRIP-ACNC: <2 MG/DL
WBC #/AREA URNS AUTO: NORMAL /HPF

## 2025-02-02 PROCEDURE — 81001 URINALYSIS AUTO W/SCOPE: CPT

## 2025-02-02 PROCEDURE — 83036 HEMOGLOBIN GLYCOSYLATED A1C: CPT

## 2025-02-02 PROCEDURE — 80061 LIPID PANEL: CPT

## 2025-02-02 PROCEDURE — 80048 BASIC METABOLIC PNL TOTAL CA: CPT

## 2025-02-02 PROCEDURE — 84443 ASSAY THYROID STIM HORMONE: CPT

## 2025-02-02 PROCEDURE — 36415 COLL VENOUS BLD VENIPUNCTURE: CPT

## 2025-02-04 ENCOUNTER — OFFICE VISIT (OUTPATIENT)
Dept: FAMILY MEDICINE CLINIC | Facility: CLINIC | Age: 25
End: 2025-02-04
Payer: COMMERCIAL

## 2025-02-04 VITALS
HEIGHT: 69 IN | WEIGHT: 224 LBS | HEART RATE: 100 BPM | DIASTOLIC BLOOD PRESSURE: 80 MMHG | BODY MASS INDEX: 33.18 KG/M2 | RESPIRATION RATE: 18 BRPM | TEMPERATURE: 97.3 F | OXYGEN SATURATION: 99 % | SYSTOLIC BLOOD PRESSURE: 132 MMHG

## 2025-02-04 DIAGNOSIS — R05.1 ACUTE COUGH: ICD-10-CM

## 2025-02-04 DIAGNOSIS — G43.009 MIGRAINE WITHOUT AURA AND WITHOUT STATUS MIGRAINOSUS, NOT INTRACTABLE: ICD-10-CM

## 2025-02-04 DIAGNOSIS — F90.2 ATTENTION DEFICIT HYPERACTIVITY DISORDER (ADHD), COMBINED TYPE: ICD-10-CM

## 2025-02-04 DIAGNOSIS — I10 ESSENTIAL HYPERTENSION: Primary | ICD-10-CM

## 2025-02-04 PROCEDURE — 99214 OFFICE O/P EST MOD 30 MIN: CPT | Performed by: INTERNAL MEDICINE

## 2025-02-04 RX ORDER — BENZONATATE 200 MG/1
200 CAPSULE ORAL 3 TIMES DAILY PRN
Qty: 20 CAPSULE | Refills: 0 | Status: SHIPPED | OUTPATIENT
Start: 2025-02-04

## 2025-02-04 RX ORDER — DEXTROAMPHETAMINE SACCHARATE, AMPHETAMINE ASPARTATE MONOHYDRATE, DEXTROAMPHETAMINE SULFATE AND AMPHETAMINE SULFATE 2.5; 2.5; 2.5; 2.5 MG/1; MG/1; MG/1; MG/1
10 CAPSULE, EXTENDED RELEASE ORAL EVERY MORNING
Qty: 30 CAPSULE | Refills: 0 | Status: SHIPPED | OUTPATIENT
Start: 2025-02-04

## 2025-02-05 PROBLEM — Z91.018 FOOD ALLERGY: Status: RESOLVED | Noted: 2021-04-22 | Resolved: 2025-02-05

## 2025-02-05 PROBLEM — R53.83 FATIGUE: Status: RESOLVED | Noted: 2023-08-09 | Resolved: 2025-02-05

## 2025-02-05 PROBLEM — R55 VASOVAGAL SYNCOPE: Status: RESOLVED | Noted: 2023-12-19 | Resolved: 2025-02-05

## 2025-02-05 PROBLEM — R68.2 DRY MOUTH: Status: RESOLVED | Noted: 2023-11-16 | Resolved: 2025-02-05

## 2025-02-05 PROBLEM — R19.7 DIARRHEA: Status: RESOLVED | Noted: 2020-10-30 | Resolved: 2025-02-05

## 2025-02-05 NOTE — ASSESSMENT & PLAN NOTE
Blood pressure is overall controlled, will continue to monitor.  Continue valsartan current dose.  Kidney function and electrolytes are stable.  Continue with low-salt diet.

## 2025-02-05 NOTE — ASSESSMENT & PLAN NOTE
He is doing great on current dose of Adderall, denies any side effects.  Continue therapy.    Orders:    amphetamine-dextroamphetamine (ADDERALL XR, 10MG,) 10 MG 24 hr capsule; Take 1 capsule (10 mg total) by mouth every morning Max Daily Amount: 10 mg

## 2025-02-05 NOTE — PROGRESS NOTES
Name: Darnell Gottlieb      : 2000      MRN: [id number]  Encounter Provider: Joshua Howard MD  Encounter Date: 2025   Encounter department: [facility]    Assessment & Plan  Essential hypertension  Blood pressure is overall controlled, will continue to monitor.  Continue valsartan current dose.  Kidney function and electrolytes are stable.  Continue with low-salt diet.         Attention deficit hyperactivity disorder (ADHD), combined type  He is doing great on current dose of Adderall, denies any side effects.  Continue therapy.    Orders:    amphetamine-dextroamphetamine (ADDERALL XR, 10MG,) 10 MG 24 hr capsule; Take 1 capsule (10 mg total) by mouth every morning Max Daily Amount: 10 mg    Acute cough  He is currently on Z-Jayme, overall improving.  Orders:    benzonatate (TESSALON) 200 MG capsule; Take 1 capsule (200 mg total) by mouth 3 (three) times a day as needed for cough    Migraine without aura and without status migrainosus, not intractable  Stable.            History of Present Illness     Patient came today for follow-up on his chronic medical problems.      Review of Systems   Constitutional:  Negative for chills, fatigue and fever.   Respiratory:  Negative for cough, chest tightness and shortness of breath.    Cardiovascular:  Negative for chest pain, palpitations and leg swelling.   Gastrointestinal:  Negative for abdominal distention, abdominal pain, blood in stool, constipation, diarrhea and nausea.   Genitourinary:  Negative for difficulty urinating and dysuria.   Musculoskeletal:  Negative for arthralgias, back pain, gait problem, joint swelling, myalgias and neck pain.   Skin:  Negative for rash.   Neurological:  Negative for dizziness, weakness, numbness and headaches.   Psychiatric/Behavioral:  Negative for agitation.      Past Medical History:   Diagnosis Date    Allergic     Anxiety     Arthritis     Asthma     Asthma due to seasonal allergies      GERD (gastroesophageal reflux disease)     Headache(784.0)     HL (hearing loss)     Hypertension     Memory loss      Past Surgical History:   Procedure Laterality Date    COLONOSCOPY      NO PAST SURGERIES      UPPER GASTROINTESTINAL ENDOSCOPY      WISDOM TOOTH EXTRACTION       Family History   Problem Relation Age of Onset    Fibromyalgia Mother     Rheum arthritis Father      Social History     Tobacco Use    Smoking status: Former     Current packs/day: 0.00     Types: Cigarettes     Quit date: 2019     Years since quittin.1     Passive exposure: Past    Smokeless tobacco: Current    Tobacco comments:     vapes   Vaping Use    Vaping status: Every Day    Start date: 2014    Substances: Nicotine, Flavoring   Substance and Sexual Activity    Alcohol use: Not Currently     Comment: every few months    Drug use: Never    Sexual activity: Yes     Partners: Female     Birth control/protection: None     Current Outpatient Medications on File Prior to Visit   Medication Sig    albuterol (Proventil HFA) 90 mcg/act inhaler Inhale 2 puffs every 6 (six) hours as needed for wheezing    albuterol (Ventolin HFA) 90 mcg/act inhaler Inhale 2 puffs every 6 (six) hours as needed for wheezing    azelastine (ASTELIN) 0.1 % nasal spray 1 spray into each nostril 2 (two) times a day Use in each nostril as directed    [] azithromycin (Zithromax Z-Jayme) 250 mg tablet Take 2 tabs x 1 day, then 1 tab x 4 days    guaiFENesin (MUCINEX) 600 mg 12 hr tablet Take 1 tablet (600 mg total) by mouth 2 (two) times a day for 5 days    omeprazole (PriLOSEC) 40 MG capsule Take 1 capsule (40 mg total) by mouth every evening    polymyxin b-trimethoprim (POLYTRIM) ophthalmic solution Administer 1 drop to both eyes every 6 (six) hours While awake for 7 days    sildenafil (VIAGRA) 50 MG tablet Take 1 tablet (50 mg total) by mouth daily as needed for erectile dysfunction    valsartan (DIOVAN) 80 mg tablet Take 1 tablet (80 mg total)  "by mouth daily    EPINEPHrine (EPIPEN) 0.3 mg/0.3 mL SOAJ Inject 0.3 mL (0.3 mg total) into a muscle once for 1 dose    ondansetron (ZOFRAN-ODT) 4 mg disintegrating tablet Take 1 tablet (4 mg total) by mouth every 6 (six) hours as needed for nausea or vomiting for up to 3 days    [DISCONTINUED] albuterol (2.5 mg/3 mL) 0.083 % nebulizer solution Take 3 mL (2.5 mg total) by nebulization every 6 (six) hours as needed for wheezing or shortness of breath (Patient not taking: Reported on 12/24/2024)    [DISCONTINUED] albuterol (ACCUNEB) 1.25 MG/3ML nebulizer solution Inhale 1 ampule every 6 (six) hours as needed (Patient not taking: Reported on 12/24/2024)    [DISCONTINUED] predniSONE 10 mg tablet There are 21 total tablets: 6 tablets on day 1, 5 tablets on day 2, 4 tablets on day 3, 3 tablets on day 4, 2 tablets on day 5, 1 tablet on day 6. (Patient not taking: Reported on 2/4/2025)     Allergies   Allergen Reactions    Nuts - Food Allergy Anaphylaxis    Other Anaphylaxis     Tree nuts  Yogurt  Carrots  Limes    Shellfish-Derived Products - Food Allergy Anaphylaxis    Latex Itching    Peanut Oil - Food Allergy     Penicillins      Family allergy      Immunization History   Administered Date(s) Administered    COVID-19 MODERNA VACC 0.5 ML IM 04/14/2021, 05/12/2021    DTaP 5 07/22/1999, 09/20/1999    Hep B, adult 1998, 09/20/1999, 11/13/1999    Hib (PRP-OMP) 07/22/1999, 09/20/1999    Influenza, seasonal, injectable 12/04/2003    Varicella 12/04/2003     Objective   /80 (BP Location: Right arm, Patient Position: Sitting, Cuff Size: Large)   Pulse 100   Temp (!) 97.3 °F (36.3 °C) (Tympanic)   Resp 18   Ht 5' 9\" (1.753 m)   Wt 102 kg (224 lb)   SpO2 99%   BMI 33.08 kg/m²     Physical Exam  Vitals reviewed.   Constitutional:       General: He is not in acute distress.     Appearance: He is not toxic-appearing.   Cardiovascular:      Heart sounds: No murmur heard.     No gallop.   Pulmonary:      Effort: No " respiratory distress.      Breath sounds: No wheezing or rales.

## 2025-03-07 ENCOUNTER — OFFICE VISIT (OUTPATIENT)
Age: 25
End: 2025-03-07
Payer: COMMERCIAL

## 2025-03-07 VITALS
OXYGEN SATURATION: 96 % | DIASTOLIC BLOOD PRESSURE: 82 MMHG | TEMPERATURE: 97.4 F | SYSTOLIC BLOOD PRESSURE: 110 MMHG | RESPIRATION RATE: 16 BRPM | HEART RATE: 84 BPM | BODY MASS INDEX: 32.58 KG/M2 | WEIGHT: 220 LBS | HEIGHT: 69 IN

## 2025-03-07 DIAGNOSIS — J20.9 ACUTE BRONCHITIS, UNSPECIFIED ORGANISM: Primary | ICD-10-CM

## 2025-03-07 PROCEDURE — 99213 OFFICE O/P EST LOW 20 MIN: CPT | Performed by: EMERGENCY MEDICINE

## 2025-03-07 RX ORDER — AZITHROMYCIN 250 MG/1
TABLET, FILM COATED ORAL
Qty: 6 TABLET | Refills: 0 | Status: SHIPPED | OUTPATIENT
Start: 2025-03-07 | End: 2025-03-11

## 2025-03-07 RX ORDER — PREDNISONE 10 MG/1
TABLET ORAL
Qty: 21 TABLET | Refills: 0 | Status: SHIPPED | OUTPATIENT
Start: 2025-03-07

## 2025-03-07 RX ORDER — BENZONATATE 200 MG/1
200 CAPSULE ORAL
Qty: 12 CAPSULE | Refills: 0 | Status: SHIPPED | OUTPATIENT
Start: 2025-03-07

## 2025-03-07 NOTE — PATIENT INSTRUCTIONS
" Take an expectorant - guaifenesin should be the only ingredient - during the day, and the cough suppressant (ex. Robitussin DM or Tessalon) if needed at night only.   Take Zinc 25 mg every 12 hours for the next week (the dose is important so do not just take a multivitamin with zinc or an over-the-counter cold med with zinc such as Airborne or Zicam, as that will not give you the sufficient dose).    You should also take Vitamin D3 5000 i.u.s per day for the next 1 week, and Vitamin-C 1,000 mg twice daily (and again dosages are important, do not think you are getting enough vitamin C just by drinking extra orange juice).  You may use Flonase as discussed.  You may also take a decongestant like Sudafed, unless you have hypertension or cardiac disease. Avoid nasal sprays like Afrin or other vasoconstrictor.  If you are diabetic you should adhere strictly to your diabetic diet and monitor blood sugar closely while on prednisone and you should discontinue the prednisone if blood sugar becomes significantly elevated.  Avoid nonsteroidal anti-inflammatories like Advil or Aleve while on prednisone.     Acute bronchitis in adults   The Basics   Written by the doctors and editors at [facility]   What is bronchitis? -- This is an infection that causes a cough. It happens when the tubes that carry air into the lungs, called the \"bronchi,\" get infected (figure 1).  Usually, bronchitis happens after a person gets a cold or the flu. The viruses that cause the cold or flu infect the bronchi and irritate them. Antibiotics do not help bronchitis.  Bronchitis can also happen when a person gets an infection called \"whooping cough,\" but this is much less common. Whooping cough is caused by bacteria that can infect the bronchi. Most people get vaccines to prevent whooping cough, but the vaccine doesn't always work. Your doctor will be able to tell if you have whooping cough by doing an exam and listening to your cough.  This article is " "about \"acute\" bronchitis. This is different from \"chronic\" bronchitis, which is a lung disease that most often affects people who smoke.  What are the symptoms of bronchitis? -- The most common symptoms are:   A cough that can last up to a few weeks   Coughing up mucus that is clear, yellow, or green - Green mucus does not always mean that you have a bacterial infection.  You might also have other cold or flu symptoms, like a stuffy nose, sore throat, or headache. People with bronchitis do not usually get a fever.  Will I need tests? -- Most people with bronchitis do not need a test. But if your doctor or nurse is not sure what is causing your cough, they might do tests. For example, they might order a chest X-ray. Or if they think that you might have COVID-19, they will test you for the virus that causes the infection.  How is bronchitis treated? -- Bronchitis almost always goes away on its own. But the cough can take up to 3 weeks to get better, and sometimes even longer.  Doctors do not usually treat bronchitis with antibiotic medicines. That's because bronchitis is usually caused by a virus, and antibiotics kill bacteria, not viruses. Also, antibiotics can actually cause other problems.  To feel better, you can treat your cold and flu symptoms. You can:   Get lots of rest, and drink plenty of liquids.   Drink hot tea.   Suck on cough drops or hard candy.   Take over-the-counter cough and cold medicines.   Breath in warm, moist air, such as in the shower, over a kettle, or from a humidifier.   Take a pain-relieving medicine if you have cold or flu symptoms like headache, muscle aches, or joint pain.  Avoid smoking or being around others who smoke. This can make your cough worse.  How can I keep from getting bronchitis again? -- You can reduce your chance of getting bronchitis again by keeping the germs that cause bronchitis out of your body. One of the best ways to do this is to wash your hands often with soap " "and water. If there is no sink nearby, you can use a hand gel with alcohol in it to clean your hands.  How can I keep from spreading germs? -- In addition to washing your hands often, cover your mouth with your elbow when you sneeze or cough. Using your elbow keeps you from getting germs on your hands. If you use a tissue, throw the tissue away and wash your hands.  When should I call the doctor? -- Call for advice if you have:   A fever higher than 100.4°F (38°C), or chills   Chest pain when you cough, trouble breathing, or coughing up blood   A barking cough that makes it hard to talk   Cough and weight loss that you cannot explain   Symptoms that are not getting better after 3 weeks  All topics are updated as new evidence becomes available and our peer review process is complete.  This topic retrieved from [email or web address] on: May 16, 2024.  Topic 06584 Version 17.0  Release: 32.4.3 - C32.135  © 2024 UpToDate, Inc. and/or its affiliates. All rights reserved.  figure 1: Normal lungs     The lungs sit in the chest, inside the ribcage. They are covered with a thin membrane called the \"pleura.\" The windpipe, or trachea, branches into 2 smaller airways called the left and right \"bronchi.\" The space between the lungs is called the \"mediastinum.\" Lymph nodes are located within and around the lungs and mediastinum.  Graphic 51645 Version 14.0  Consumer Information Use and Disclaimer   Disclaimer: This generalized information is a limited summary of diagnosis, treatment, and/or medication information. It is not meant to be comprehensive and should be used as a tool to help the user understand and/or assess potential diagnostic and treatment options. It does NOT include all information about conditions, treatments, medications, side effects, or risks that may apply to a specific patient. It is not intended to be medical advice or a substitute for the medical advice, diagnosis, or treatment of a health care provider based on the " health care provider's examination and assessment of a patient's specific and unique circumstances. Patients must speak with a health care provider for complete information about their health, medical questions, and treatment options, including any risks or benefits regarding use of medications. This information does not endorse any treatments or medications as safe, effective, or approved for treating a specific patient. UpToDate, Inc. and its affiliates disclaim any warranty or liability relating to this information or the use thereof.The use of this information is governed by the Terms of Use, available at https://www.woltersGet10uwer.com/en/know/clinical-effectiveness-terms. 2024© UpToDate, Inc. and its affiliates and/or licensors. All rights reserved.  Copyright   © 2024 UpToDate, Inc. and/or its affiliates. All rights reserved.

## 2025-03-07 NOTE — PROGRESS NOTES
"[facility]        NAME: Darnell Gottlieb is a 24 y.o. male  : 2000    MRN: [id number]  DATE: 2025  TIME: 4:50 PM    Assessment and Plan   Acute bronchitis, unspecified organism [J20.9]  1. Acute bronchitis, unspecified organism  azithromycin (ZITHROMAX) 250 mg tablet    predniSONE 10 mg tablet    benzonatate (TESSALON) 200 MG capsule            Patient Instructions     Patient Instructions    Take an expectorant - guaifenesin should be the only ingredient - during the day, and the cough suppressant (ex. Robitussin DM or Tessalon) if needed at night only.   Take Zinc 25 mg every 12 hours for the next week (the dose is important so do not just take a multivitamin with zinc or an over-the-counter cold med with zinc such as Airborne or Zicam, as that will not give you the sufficient dose).    You should also take Vitamin D3 5000 i.u.s per day for the next 1 week, and Vitamin-C 1,000 mg twice daily (and again dosages are important, do not think you are getting enough vitamin C just by drinking extra orange juice).  You may use Flonase as discussed.  You may also take a decongestant like Sudafed, unless you have hypertension or cardiac disease. Avoid nasal sprays like Afrin or other vasoconstrictor.  If you are diabetic you should adhere strictly to your diabetic diet and monitor blood sugar closely while on prednisone and you should discontinue the prednisone if blood sugar becomes significantly elevated.  Avoid nonsteroidal anti-inflammatories like Advil or Aleve while on prednisone.     Acute bronchitis in adults   The Basics   Written by the doctors and editors at [facility]   What is bronchitis? -- This is an infection that causes a cough. It happens when the tubes that carry air into the lungs, called the \"bronchi,\" get infected (figure 1).  Usually, bronchitis happens after a person gets a cold or the flu. The viruses that cause the cold or flu infect the bronchi and irritate them. Antibiotics " "do not help bronchitis.  Bronchitis can also happen when a person gets an infection called \"whooping cough,\" but this is much less common. Whooping cough is caused by bacteria that can infect the bronchi. Most people get vaccines to prevent whooping cough, but the vaccine doesn't always work. Your doctor will be able to tell if you have whooping cough by doing an exam and listening to your cough.  This article is about \"acute\" bronchitis. This is different from \"chronic\" bronchitis, which is a lung disease that most often affects people who smoke.  What are the symptoms of bronchitis? -- The most common symptoms are:   A cough that can last up to a few weeks   Coughing up mucus that is clear, yellow, or green - Green mucus does not always mean that you have a bacterial infection.  You might also have other cold or flu symptoms, like a stuffy nose, sore throat, or headache. People with bronchitis do not usually get a fever.  Will I need tests? -- Most people with bronchitis do not need a test. But if your doctor or nurse is not sure what is causing your cough, they might do tests. For example, they might order a chest X-ray. Or if they think that you might have COVID-19, they will test you for the virus that causes the infection.  How is bronchitis treated? -- Bronchitis almost always goes away on its own. But the cough can take up to 3 weeks to get better, and sometimes even longer.  Doctors do not usually treat bronchitis with antibiotic medicines. That's because bronchitis is usually caused by a virus, and antibiotics kill bacteria, not viruses. Also, antibiotics can actually cause other problems.  To feel better, you can treat your cold and flu symptoms. You can:   Get lots of rest, and drink plenty of liquids.   Drink hot tea.   Suck on cough drops or hard candy.   Take over-the-counter cough and cold medicines.   Breath in warm, moist air, such as in the shower, over a kettle, or from a humidifier.   Take a " "pain-relieving medicine if you have cold or flu symptoms like headache, muscle aches, or joint pain.  Avoid smoking or being around others who smoke. This can make your cough worse.  How can I keep from getting bronchitis again? -- You can reduce your chance of getting bronchitis again by keeping the germs that cause bronchitis out of your body. One of the best ways to do this is to wash your hands often with soap and water. If there is no sink nearby, you can use a hand gel with alcohol in it to clean your hands.  How can I keep from spreading germs? -- In addition to washing your hands often, cover your mouth with your elbow when you sneeze or cough. Using your elbow keeps you from getting germs on your hands. If you use a tissue, throw the tissue away and wash your hands.  When should I call the doctor? -- Call for advice if you have:   A fever higher than 100.4°F (38°C), or chills   Chest pain when you cough, trouble breathing, or coughing up blood   A barking cough that makes it hard to talk   Cough and weight loss that you cannot explain   Symptoms that are not getting better after 3 weeks  All topics are updated as new evidence becomes available and our peer review process is complete.  This topic retrieved from [employer] on: May 16, 2024.  Topic 25405 Version 17.0  Release: 32.4.3 - C32.135  © 2024 UpToDate, Inc. and/or its affiliates. All rights reserved.  figure 1: Normal lungs     The lungs sit in the chest, inside the ribcage. They are covered with a thin membrane called the \"pleura.\" The windpipe, or trachea, branches into 2 smaller airways called the left and right \"bronchi.\" The space between the lungs is called the \"mediastinum.\" Lymph nodes are located within and around the lungs and mediastinum.  Graphic 23177 Version 14.0  Consumer Information Use and Disclaimer   Disclaimer: This generalized information is a limited summary of diagnosis, treatment, and/or medication information. It is not meant to " be comprehensive and should be used as a tool to help the user understand and/or assess potential diagnostic and treatment options. It does NOT include all information about conditions, treatments, medications, side effects, or risks that may apply to a specific patient. It is not intended to be medical advice or a substitute for the medical advice, diagnosis, or treatment of a health care provider based on the health care provider's examination and assessment of a patient's specific and unique circumstances. Patients must speak with a health care provider for complete information about their health, medical questions, and treatment options, including any risks or benefits regarding use of medications. This information does not endorse any treatments or medications as safe, effective, or approved for treating a specific patient. UpToDate, Inc. and its affiliates disclaim any warranty or liability relating to this information or the use thereof.The use of this information is governed by the Terms of Use, available at https://www.[employer]/en/know/clinical-effectiveness-terms. 2024© UpToDate, Inc. and its affiliates and/or licensors. All rights reserved.  Copyright   © 2024 UpToDate, Inc. and/or its affiliates. All rights reserved.      Follow up with PCP in 3-5 days.  Proceed to  ER if symptoms worsen.    Chief Complaint     Chief Complaint   Patient presents with    Cough     C/O cough x 7 days prior to that reports prior to that was sick with fever and flu type symptoms.         History of Present Illness       Patient with flu like symptoms starting 2 weeks ago gradually improving however for the past week he has developed a worsening productive cough.    Cough  Pertinent negatives include no chills, fever, headaches, rhinorrhea, shortness of breath or wheezing.       Review of Systems   Review of Systems   Constitutional:  Negative for chills and fever.   HENT:  Negative for congestion, ear discharge,  hearing loss, rhinorrhea and sinus pressure.    Respiratory:  Positive for cough. Negative for chest tightness, shortness of breath and wheezing.    Gastrointestinal:  Negative for diarrhea and vomiting.   Musculoskeletal:  Negative for neck stiffness.   Skin:  Negative for pallor.   Neurological:  Negative for headaches.         Current Medications       Current Outpatient Medications:     albuterol (Proventil HFA) 90 mcg/act inhaler, Inhale 2 puffs every 6 (six) hours as needed for wheezing, Disp: 6.7 g, Rfl: 0    amphetamine-dextroamphetamine (ADDERALL XR, 10MG,) 10 MG 24 hr capsule, Take 1 capsule (10 mg total) by mouth every morning Max Daily Amount: 10 mg, Disp: 30 capsule, Rfl: 0    azithromycin (ZITHROMAX) 250 mg tablet, Take 2 tablets today then 1 tablet daily x 4 days, Disp: 6 tablet, Rfl: 0    benzonatate (TESSALON) 200 MG capsule, Take 1 capsule (200 mg total) by mouth daily at bedtime as needed for cough, Disp: 12 capsule, Rfl: 0    EPINEPHrine (EPIPEN) 0.3 mg/0.3 mL SOAJ, Inject 0.3 mL (0.3 mg total) into a muscle once for 1 dose, Disp: 0.6 mL, Rfl: 0    omeprazole (PriLOSEC) 40 MG capsule, Take 1 capsule (40 mg total) by mouth every evening, Disp: 90 capsule, Rfl: 3    ondansetron (ZOFRAN-ODT) 4 mg disintegrating tablet, Take 1 tablet (4 mg total) by mouth every 6 (six) hours as needed for nausea or vomiting for up to 3 days, Disp: 12 tablet, Rfl: 0    predniSONE 10 mg tablet, Take once daily all day's pills on this schedule  6- 5- 4- 3- 2- 1, Disp: 21 tablet, Rfl: 0    sildenafil (VIAGRA) 50 MG tablet, Take 1 tablet (50 mg total) by mouth daily as needed for erectile dysfunction, Disp: 10 tablet, Rfl: 0    valsartan (DIOVAN) 80 mg tablet, Take 1 tablet (80 mg total) by mouth daily, Disp: 90 tablet, Rfl: 3    albuterol (Ventolin HFA) 90 mcg/act inhaler, Inhale 2 puffs every 6 (six) hours as needed for wheezing (Patient not taking: Reported on 3/7/2025), Disp: 6.7 g, Rfl: 5    azelastine (ASTELIN) 0.1  "% nasal spray, 1 spray into each nostril 2 (two) times a day Use in each nostril as directed (Patient not taking: Reported on 3/7/2025), Disp: 1 mL, Rfl: 0    benzonatate (TESSALON) 200 MG capsule, Take 1 capsule (200 mg total) by mouth 3 (three) times a day as needed for cough (Patient not taking: Reported on 3/7/2025), Disp: 20 capsule, Rfl: 0    polymyxin b-trimethoprim (POLYTRIM) ophthalmic solution, Administer 1 drop to both eyes every 6 (six) hours While awake for 7 days (Patient not taking: Reported on 3/7/2025), Disp: 10 mL, Rfl: 0    Current Allergies     Allergies as of 03/07/2025 - Reviewed 03/07/2025   Allergen Reaction Noted    Nuts - food allergy Anaphylaxis     Other Anaphylaxis 08/06/2017    Shellfish-derived products - food allergy Anaphylaxis 08/06/2017    Latex Itching 10/03/2022    Peanut oil - food allergy  05/03/2017    Penicillins  10/30/2020            The following portions of the patient's history were reviewed and updated as appropriate: allergies, current medications, past family history, past medical history, past social history, past surgical history and problem list.     Past Medical History:   Diagnosis Date    Allergic     Anxiety     Arthritis     Asthma     Asthma due to seasonal allergies     GERD (gastroesophageal reflux disease)     Headache(784.0)     HL (hearing loss)     Hypertension     Memory loss        Past Surgical History:   Procedure Laterality Date    COLONOSCOPY      NO PAST SURGERIES      UPPER GASTROINTESTINAL ENDOSCOPY      WISDOM TOOTH EXTRACTION         Family History   Problem Relation Age of Onset    Fibromyalgia Mother     Rheum arthritis Father          Medications have been verified.        Objective   /82   Pulse 84   Temp (!) 97.4 °F (36.3 °C)   Resp 16   Ht 5' 9\" (1.753 m)   Wt 99.8 kg (220 lb)   SpO2 96%   BMI 32.49 kg/m²        Physical Exam     Physical Exam  Vitals and nursing note reviewed.   Constitutional:       General: He is not in " acute distress.     Appearance: Normal appearance. He is well-developed. He is not ill-appearing or diaphoretic.   HENT:      Head: Normocephalic and atraumatic.      Right Ear: Tympanic membrane, ear canal and external ear normal.      Left Ear: Tympanic membrane, ear canal and external ear normal.      Nose: Mucosal edema and congestion present. No rhinorrhea.      Mouth/Throat:      Pharynx: Posterior oropharyngeal erythema present.   Eyes:      Conjunctiva/sclera: Conjunctivae normal.      Pupils: Pupils are equal, round, and reactive to light.   Cardiovascular:      Rate and Rhythm: Normal rate and regular rhythm.      Heart sounds: Normal heart sounds.   Pulmonary:      Effort: Pulmonary effort is normal. No respiratory distress.      Breath sounds: Rhonchi present. No wheezing or rales.   Musculoskeletal:      Cervical back: Neck supple.   Lymphadenopathy:      Cervical: No cervical adenopathy.   Skin:     General: Skin is warm and dry.      Coloration: Skin is not pale.      Findings: No rash.   Neurological:      Mental Status: He is alert and oriented to person, place, and time.   Psychiatric:         Mood and Affect: Mood normal.         Behavior: Behavior normal.         Thought Content: Thought content normal.         Judgment: Judgment normal.

## 2025-03-10 ENCOUNTER — OFFICE VISIT (OUTPATIENT)
Dept: URGENT CARE | Facility: MEDICAL CENTER | Age: 25
End: 2025-03-10
Payer: COMMERCIAL

## 2025-03-10 VITALS
HEIGHT: 69 IN | WEIGHT: 215.8 LBS | SYSTOLIC BLOOD PRESSURE: 132 MMHG | HEART RATE: 102 BPM | DIASTOLIC BLOOD PRESSURE: 87 MMHG | BODY MASS INDEX: 31.96 KG/M2 | OXYGEN SATURATION: 96 % | RESPIRATION RATE: 18 BRPM | TEMPERATURE: 99.6 F

## 2025-03-10 DIAGNOSIS — Z20.2 STD EXPOSURE: Primary | ICD-10-CM

## 2025-03-10 DIAGNOSIS — F90.2 ATTENTION DEFICIT HYPERACTIVITY DISORDER (ADHD), COMBINED TYPE: ICD-10-CM

## 2025-03-10 PROCEDURE — 87491 CHLMYD TRACH DNA AMP PROBE: CPT | Performed by: PHYSICIAN ASSISTANT

## 2025-03-10 PROCEDURE — 87591 N.GONORRHOEAE DNA AMP PROB: CPT | Performed by: PHYSICIAN ASSISTANT

## 2025-03-10 PROCEDURE — 99212 OFFICE O/P EST SF 10 MIN: CPT | Performed by: PHYSICIAN ASSISTANT

## 2025-03-10 NOTE — PROGRESS NOTES
[facility]        NAME: Darnell Gottlieb is a 24 y.o. male  : 2000    MRN: [id number]  DATE: March 10, 2025  TIME: 7:11 PM    Assessment and Plan   STD exposure [Z20.2]  1. STD exposure              Patient Instructions       Follow up for further STD testing.  I explained the patient the only thing we can check for GC and chlamydia which urine was done.  I explained he needs to have full STD testing and he does have a sheet with clinics providing this service and states he is going to but he wanted to start with this.    If tests have been performed at [facility], our office will contact you with results if changes need to be made to the care plan discussed with you at the visit.  You can review your full results on [facility] MyChart.    Chief Complaint     Chief Complaint   Patient presents with    Exposure to STD     Pt here to get tested for STD's         History of Present Illness       Patient had unprotected sex 2 weeks ago and wants to be checked for STDs.  He is asymptomatic.    Exposure to STD  The patient's pertinent negatives include no genital injury, genital itching, genital lesions, pelvic pain, penile discharge, penile pain, priapism, scrotal swelling or testicular pain. This is a new problem. The current episode started 1 to 4 weeks ago. The problem has been unchanged. The patient is experiencing no pain. Pertinent negatives include no abdominal pain, anorexia, chest pain, chills, constipation, coughing, diarrhea, discolored urine, dysuria, fever, flank pain, frequency, headaches, hematuria, hesitancy, joint pain, joint swelling, nausea, painful intercourse, rash, shortness of breath, sore throat, urgency, urinary retention or vomiting.       Review of Systems   Review of Systems   Constitutional:  Negative for chills and fever.   HENT:  Negative for sore throat.    Respiratory:  Negative for cough and shortness of breath.    Cardiovascular:  Negative for chest pain.   Gastrointestinal:   Negative for abdominal pain, anorexia, constipation, diarrhea, nausea and vomiting.   Genitourinary:  Negative for dysuria, flank pain, frequency, hesitancy, pelvic pain, penile discharge, penile pain, scrotal swelling, testicular pain and urgency.   Musculoskeletal:  Negative for joint pain.   Skin:  Negative for rash.   Neurological:  Negative for headaches.   All other systems reviewed and are negative.        Current Medications       Current Outpatient Medications:     albuterol (Proventil HFA) 90 mcg/act inhaler, Inhale 2 puffs every 6 (six) hours as needed for wheezing, Disp: 6.7 g, Rfl: 0    amphetamine-dextroamphetamine (ADDERALL XR, 10MG,) 10 MG 24 hr capsule, Take 1 capsule (10 mg total) by mouth every morning Max Daily Amount: 10 mg, Disp: 30 capsule, Rfl: 0    azithromycin (ZITHROMAX) 250 mg tablet, Take 2 tablets today then 1 tablet daily x 4 days, Disp: 6 tablet, Rfl: 0    benzonatate (TESSALON) 200 MG capsule, Take 1 capsule (200 mg total) by mouth daily at bedtime as needed for cough, Disp: 12 capsule, Rfl: 0    omeprazole (PriLOSEC) 40 MG capsule, Take 1 capsule (40 mg total) by mouth every evening, Disp: 90 capsule, Rfl: 3    ondansetron (ZOFRAN-ODT) 4 mg disintegrating tablet, Take 1 tablet (4 mg total) by mouth every 6 (six) hours as needed for nausea or vomiting for up to 3 days, Disp: 12 tablet, Rfl: 0    predniSONE 10 mg tablet, Take once daily all day's pills on this schedule  6- 5- 4- 3- 2- 1, Disp: 21 tablet, Rfl: 0    sildenafil (VIAGRA) 50 MG tablet, Take 1 tablet (50 mg total) by mouth daily as needed for erectile dysfunction, Disp: 10 tablet, Rfl: 0    valsartan (DIOVAN) 80 mg tablet, Take 1 tablet (80 mg total) by mouth daily, Disp: 90 tablet, Rfl: 3    albuterol (Ventolin HFA) 90 mcg/act inhaler, Inhale 2 puffs every 6 (six) hours as needed for wheezing (Patient not taking: Reported on 3/7/2025), Disp: 6.7 g, Rfl: 5    azelastine (ASTELIN) 0.1 % nasal spray, 1 spray into each  "nostril 2 (two) times a day Use in each nostril as directed (Patient not taking: Reported on 3/7/2025), Disp: 1 mL, Rfl: 0    benzonatate (TESSALON) 200 MG capsule, Take 1 capsule (200 mg total) by mouth 3 (three) times a day as needed for cough (Patient not taking: Reported on 3/7/2025), Disp: 20 capsule, Rfl: 0    EPINEPHrine (EPIPEN) 0.3 mg/0.3 mL SOAJ, Inject 0.3 mL (0.3 mg total) into a muscle once for 1 dose, Disp: 0.6 mL, Rfl: 0    polymyxin b-trimethoprim (POLYTRIM) ophthalmic solution, Administer 1 drop to both eyes every 6 (six) hours While awake for 7 days (Patient not taking: Reported on 3/7/2025), Disp: 10 mL, Rfl: 0    Current Allergies     Allergies as of 03/10/2025 - Reviewed 03/10/2025   Allergen Reaction Noted    Nuts - food allergy Anaphylaxis     Other Anaphylaxis 08/06/2017    Shellfish-derived products - food allergy Anaphylaxis 08/06/2017    Latex Itching 10/03/2022    Peanut oil - food allergy  05/03/2017    Penicillins  10/30/2020            The following portions of the patient's history were reviewed and updated as appropriate: allergies, current medications, past family history, past medical history, past social history, past surgical history and problem list.     Past Medical History:   Diagnosis Date    Allergic     Anxiety     Arthritis     Asthma     Asthma due to seasonal allergies     GERD (gastroesophageal reflux disease)     Headache(784.0)     HL (hearing loss)     Hypertension     Memory loss        Past Surgical History:   Procedure Laterality Date    COLONOSCOPY      NO PAST SURGERIES      UPPER GASTROINTESTINAL ENDOSCOPY      WISDOM TOOTH EXTRACTION         Family History   Problem Relation Age of Onset    Fibromyalgia Mother     Rheum arthritis Father          Medications have been verified.        Objective   /87 (BP Location: Left arm, Patient Position: Sitting)   Pulse 102   Temp 99.6 °F (37.6 °C) (Temporal)   Resp 18   Ht 5' 9\" (1.753 m)   Wt 97.9 kg (215 lb " 12.8 oz)   SpO2 96%   BMI 31.87 kg/m²   No LMP for male patient.       Physical Exam     Physical Exam  Vitals and nursing note reviewed.   Constitutional:       Appearance: Normal appearance. He is normal weight.   Cardiovascular:      Rate and Rhythm: Regular rhythm. Tachycardia present.      Pulses: Normal pulses.      Heart sounds: Normal heart sounds.   Pulmonary:      Effort: Pulmonary effort is normal.      Breath sounds: Normal breath sounds.   Neurological:      Mental Status: He is alert.

## 2025-03-10 NOTE — TELEPHONE ENCOUNTER
Reason for call:   [x] Refill   [] Prior Auth  [] Other:     Office:   [x] PCP/Provider -Joshua Howard,    [] Specialty/Provider -     Medication: (ADDERALL XR, 10MG,)     Dose/Frequency: 1 daily    Quantity: 30    Pharmacy: [facility] [place]    [facility]   Does the patient have enough for 3 days?   [x] Yes   [] No - Send as HP to POD    Mail Away Pharmacy   Does the patient have enough for 10 days?   [] Yes   [] No - Send as HP to POD

## 2025-03-11 LAB
C TRACH DNA SPEC QL NAA+PROBE: NEGATIVE
N GONORRHOEA DNA SPEC QL NAA+PROBE: NEGATIVE

## 2025-03-13 RX ORDER — DEXTROAMPHETAMINE SACCHARATE, AMPHETAMINE ASPARTATE MONOHYDRATE, DEXTROAMPHETAMINE SULFATE AND AMPHETAMINE SULFATE 2.5; 2.5; 2.5; 2.5 MG/1; MG/1; MG/1; MG/1
10 CAPSULE, EXTENDED RELEASE ORAL EVERY MORNING
Qty: 30 CAPSULE | Refills: 0 | Status: SHIPPED | OUTPATIENT
Start: 2025-03-13

## 2025-04-21 DIAGNOSIS — F90.2 ATTENTION DEFICIT HYPERACTIVITY DISORDER (ADHD), COMBINED TYPE: ICD-10-CM

## 2025-04-21 DIAGNOSIS — J20.9 ACUTE BRONCHITIS: ICD-10-CM

## 2025-04-23 RX ORDER — ALBUTEROL SULFATE 90 UG/1
2 INHALANT RESPIRATORY (INHALATION) EVERY 6 HOURS PRN
Qty: 6.7 G | Refills: 0 | Status: SHIPPED | OUTPATIENT
Start: 2025-04-23

## 2025-04-23 RX ORDER — DEXTROAMPHETAMINE SACCHARATE, AMPHETAMINE ASPARTATE MONOHYDRATE, DEXTROAMPHETAMINE SULFATE AND AMPHETAMINE SULFATE 2.5; 2.5; 2.5; 2.5 MG/1; MG/1; MG/1; MG/1
10 CAPSULE, EXTENDED RELEASE ORAL EVERY MORNING
Qty: 30 CAPSULE | Refills: 0 | Status: SHIPPED | OUTPATIENT
Start: 2025-04-23

## 2025-04-26 DIAGNOSIS — R05.1 ACUTE COUGH: ICD-10-CM

## 2025-04-26 DIAGNOSIS — K21.9 GASTROESOPHAGEAL REFLUX DISEASE WITHOUT ESOPHAGITIS: ICD-10-CM

## 2025-04-26 DIAGNOSIS — I10 ESSENTIAL HYPERTENSION: ICD-10-CM

## 2025-04-26 RX ORDER — OMEPRAZOLE 40 MG/1
40 CAPSULE, DELAYED RELEASE ORAL EVERY EVENING
Qty: 90 CAPSULE | Refills: 1 | Status: SHIPPED | OUTPATIENT
Start: 2025-04-26

## 2025-04-26 RX ORDER — VALSARTAN 80 MG/1
80 TABLET ORAL DAILY
Qty: 90 TABLET | Refills: 1 | Status: SHIPPED | OUTPATIENT
Start: 2025-04-26

## 2025-04-28 RX ORDER — BENZONATATE 200 MG/1
200 CAPSULE ORAL 3 TIMES DAILY PRN
Qty: 20 CAPSULE | Refills: 0 | Status: SHIPPED | OUTPATIENT
Start: 2025-04-28

## 2025-05-06 ENCOUNTER — TELEPHONE (OUTPATIENT)
Dept: FAMILY MEDICINE CLINIC | Facility: CLINIC | Age: 25
End: 2025-05-06

## 2025-05-06 NOTE — TELEPHONE ENCOUNTER
LVM for pt to call back to reschedule his appointment on 8-7-25 at 5:45 PM with Dr. Howard due to him being on vacation.

## 2025-05-28 DIAGNOSIS — J20.9 ACUTE BRONCHITIS: ICD-10-CM

## 2025-05-28 DIAGNOSIS — F90.2 ATTENTION DEFICIT HYPERACTIVITY DISORDER (ADHD), COMBINED TYPE: ICD-10-CM

## 2025-05-28 DIAGNOSIS — R05.1 ACUTE COUGH: ICD-10-CM

## 2025-05-30 RX ORDER — BENZONATATE 200 MG/1
200 CAPSULE ORAL 3 TIMES DAILY PRN
Qty: 20 CAPSULE | Refills: 0 | Status: SHIPPED | OUTPATIENT
Start: 2025-05-30

## 2025-05-30 RX ORDER — ALBUTEROL SULFATE 90 UG/1
2 INHALANT RESPIRATORY (INHALATION) EVERY 6 HOURS PRN
Qty: 6.7 G | Refills: 0 | Status: SHIPPED | OUTPATIENT
Start: 2025-05-30

## 2025-05-30 RX ORDER — DEXTROAMPHETAMINE SACCHARATE, AMPHETAMINE ASPARTATE MONOHYDRATE, DEXTROAMPHETAMINE SULFATE AND AMPHETAMINE SULFATE 2.5; 2.5; 2.5; 2.5 MG/1; MG/1; MG/1; MG/1
10 CAPSULE, EXTENDED RELEASE ORAL EVERY MORNING
Qty: 30 CAPSULE | Refills: 0 | Status: SHIPPED | OUTPATIENT
Start: 2025-05-30

## 2025-06-26 ENCOUNTER — OCCMED (OUTPATIENT)
Dept: URGENT CARE | Facility: CLINIC | Age: 25
End: 2025-06-26

## 2025-06-26 DIAGNOSIS — Z02.1 PRE-EMPLOYMENT EXAMINATION: Primary | ICD-10-CM

## 2025-06-26 PROCEDURE — 36415 COLL VENOUS BLD VENIPUNCTURE: CPT | Performed by: NURSE PRACTITIONER

## 2025-06-26 PROCEDURE — 86480 TB TEST CELL IMMUN MEASURE: CPT | Performed by: NURSE PRACTITIONER

## 2025-06-27 LAB
GAMMA INTERFERON BACKGROUND BLD IA-ACNC: 0.07 IU/ML
M TB IFN-G BLD-IMP: NEGATIVE
M TB IFN-G CD4+ BCKGRND COR BLD-ACNC: 0.01 IU/ML
M TB IFN-G CD4+ BCKGRND COR BLD-ACNC: 0.04 IU/ML
MITOGEN IGNF BCKGRD COR BLD-ACNC: 9.93 IU/ML

## 2025-07-05 DIAGNOSIS — I10 ESSENTIAL HYPERTENSION: ICD-10-CM

## 2025-07-05 DIAGNOSIS — F90.2 ATTENTION DEFICIT HYPERACTIVITY DISORDER (ADHD), COMBINED TYPE: ICD-10-CM

## 2025-07-05 DIAGNOSIS — K21.9 GASTROESOPHAGEAL REFLUX DISEASE WITHOUT ESOPHAGITIS: ICD-10-CM

## 2025-07-05 DIAGNOSIS — J20.9 ACUTE BRONCHITIS: ICD-10-CM

## 2025-07-06 RX ORDER — OMEPRAZOLE 40 MG/1
40 CAPSULE, DELAYED RELEASE ORAL EVERY EVENING
Qty: 90 CAPSULE | Refills: 1 | Status: SHIPPED | OUTPATIENT
Start: 2025-07-06

## 2025-07-06 RX ORDER — ALBUTEROL SULFATE 90 UG/1
2 INHALANT RESPIRATORY (INHALATION) EVERY 6 HOURS PRN
Qty: 6.7 G | Refills: 0 | Status: SHIPPED | OUTPATIENT
Start: 2025-07-06

## 2025-07-07 RX ORDER — DEXTROAMPHETAMINE SACCHARATE, AMPHETAMINE ASPARTATE MONOHYDRATE, DEXTROAMPHETAMINE SULFATE AND AMPHETAMINE SULFATE 2.5; 2.5; 2.5; 2.5 MG/1; MG/1; MG/1; MG/1
10 CAPSULE, EXTENDED RELEASE ORAL EVERY MORNING
Qty: 30 CAPSULE | Refills: 0 | Status: SHIPPED | OUTPATIENT
Start: 2025-07-07

## 2025-07-07 RX ORDER — VALSARTAN 80 MG/1
80 TABLET ORAL DAILY
Qty: 90 TABLET | Refills: 1 | Status: SHIPPED | OUTPATIENT
Start: 2025-07-07

## 2025-08-04 ENCOUNTER — OFFICE VISIT (OUTPATIENT)
Dept: URGENT CARE | Facility: MEDICAL CENTER | Age: 25
End: 2025-08-04
Payer: COMMERCIAL

## 2025-08-04 VITALS
HEART RATE: 104 BPM | SYSTOLIC BLOOD PRESSURE: 140 MMHG | OXYGEN SATURATION: 98 % | TEMPERATURE: 98.5 F | DIASTOLIC BLOOD PRESSURE: 84 MMHG | RESPIRATION RATE: 18 BRPM

## 2025-08-04 DIAGNOSIS — R42 LIGHTHEADEDNESS: Primary | ICD-10-CM

## 2025-08-04 PROCEDURE — 99213 OFFICE O/P EST LOW 20 MIN: CPT

## 2025-08-05 ENCOUNTER — DOCUMENTATION (OUTPATIENT)
Dept: ADMINISTRATIVE | Facility: OTHER | Age: 25
End: 2025-08-05

## 2025-08-08 ENCOUNTER — HOSPITAL ENCOUNTER (EMERGENCY)
Facility: HOSPITAL | Age: 25
Discharge: HOME | End: 2025-08-08
Attending: EMERGENCY MEDICINE
Payer: COMMERCIAL

## 2025-08-12 ENCOUNTER — OFFICE VISIT (OUTPATIENT)
Dept: FAMILY MEDICINE CLINIC | Facility: CLINIC | Age: 25
End: 2025-08-12
Payer: COMMERCIAL

## 2025-08-21 ENCOUNTER — RA CDI HCC (OUTPATIENT)
Dept: OTHER | Facility: HOSPITAL | Age: 25
End: 2025-08-21